# Patient Record
Sex: FEMALE | Race: WHITE | NOT HISPANIC OR LATINO
[De-identification: names, ages, dates, MRNs, and addresses within clinical notes are randomized per-mention and may not be internally consistent; named-entity substitution may affect disease eponyms.]

---

## 2017-01-05 ENCOUNTER — APPOINTMENT (OUTPATIENT)
Dept: INTERNAL MEDICINE | Facility: CLINIC | Age: 82
End: 2017-01-05

## 2017-04-20 ENCOUNTER — APPOINTMENT (OUTPATIENT)
Dept: PULMONOLOGY | Facility: CLINIC | Age: 82
End: 2017-04-20

## 2017-04-20 VITALS
HEART RATE: 76 BPM | SYSTOLIC BLOOD PRESSURE: 140 MMHG | DIASTOLIC BLOOD PRESSURE: 70 MMHG | OXYGEN SATURATION: 97 % | HEIGHT: 63 IN | BODY MASS INDEX: 23.21 KG/M2

## 2017-04-20 VITALS — BODY MASS INDEX: 23.21 KG/M2 | WEIGHT: 131 LBS

## 2017-04-24 ENCOUNTER — FORM ENCOUNTER (OUTPATIENT)
Age: 82
End: 2017-04-24

## 2017-04-25 ENCOUNTER — OUTPATIENT (OUTPATIENT)
Dept: OUTPATIENT SERVICES | Facility: HOSPITAL | Age: 82
LOS: 1 days | End: 2017-04-25
Payer: MEDICARE

## 2017-04-25 ENCOUNTER — APPOINTMENT (OUTPATIENT)
Dept: RADIOLOGY | Facility: CLINIC | Age: 82
End: 2017-04-25

## 2017-04-25 DIAGNOSIS — K76.89 OTHER SPECIFIED DISEASES OF LIVER: Chronic | ICD-10-CM

## 2017-04-25 DIAGNOSIS — Z96.619 PRESENCE OF UNSPECIFIED ARTIFICIAL SHOULDER JOINT: Chronic | ICD-10-CM

## 2017-04-25 DIAGNOSIS — J47.9 BRONCHIECTASIS, UNCOMPLICATED: ICD-10-CM

## 2017-04-25 PROCEDURE — 71046 X-RAY EXAM CHEST 2 VIEWS: CPT

## 2017-04-25 PROCEDURE — 71020: CPT | Mod: 26

## 2017-06-12 ENCOUNTER — APPOINTMENT (OUTPATIENT)
Dept: DERMATOLOGY | Facility: CLINIC | Age: 82
End: 2017-06-12

## 2017-06-16 ENCOUNTER — APPOINTMENT (OUTPATIENT)
Dept: PULMONOLOGY | Facility: CLINIC | Age: 82
End: 2017-06-16

## 2017-06-16 VITALS
BODY MASS INDEX: 22.67 KG/M2 | SYSTOLIC BLOOD PRESSURE: 128 MMHG | DIASTOLIC BLOOD PRESSURE: 82 MMHG | WEIGHT: 128 LBS | OXYGEN SATURATION: 96 % | HEART RATE: 71 BPM

## 2017-06-16 RX ORDER — PREDNISONE 10 MG/1
10 TABLET ORAL
Qty: 30 | Refills: 3 | Status: DISCONTINUED | COMMUNITY
Start: 2017-04-20 | End: 2017-06-16

## 2017-06-16 RX ORDER — DOXYCYCLINE HYCLATE 100 MG/1
100 CAPSULE ORAL
Qty: 10 | Refills: 6 | Status: DISCONTINUED | COMMUNITY
Start: 2017-04-20 | End: 2017-06-16

## 2017-06-16 RX ORDER — SERTRALINE HYDROCHLORIDE 50 MG/1
50 TABLET, FILM COATED ORAL
Qty: 45 | Refills: 0 | Status: DISCONTINUED | COMMUNITY
Start: 2016-12-07 | End: 2017-06-16

## 2017-09-25 ENCOUNTER — APPOINTMENT (OUTPATIENT)
Dept: CT IMAGING | Facility: CLINIC | Age: 82
End: 2017-09-25

## 2017-10-04 ENCOUNTER — APPOINTMENT (OUTPATIENT)
Dept: CT IMAGING | Facility: CLINIC | Age: 82
End: 2017-10-04
Payer: MEDICARE

## 2017-10-04 ENCOUNTER — OUTPATIENT (OUTPATIENT)
Dept: OUTPATIENT SERVICES | Facility: HOSPITAL | Age: 82
LOS: 1 days | End: 2017-10-04
Payer: MEDICARE

## 2017-10-04 DIAGNOSIS — K76.89 OTHER SPECIFIED DISEASES OF LIVER: Chronic | ICD-10-CM

## 2017-10-04 DIAGNOSIS — Z96.619 PRESENCE OF UNSPECIFIED ARTIFICIAL SHOULDER JOINT: Chronic | ICD-10-CM

## 2017-10-04 DIAGNOSIS — R91.8 OTHER NONSPECIFIC ABNORMAL FINDING OF LUNG FIELD: ICD-10-CM

## 2017-10-04 PROCEDURE — 71250 CT THORAX DX C-: CPT | Mod: 26

## 2017-10-04 PROCEDURE — 71250 CT THORAX DX C-: CPT

## 2017-10-09 ENCOUNTER — APPOINTMENT (OUTPATIENT)
Dept: THORACIC SURGERY | Facility: CLINIC | Age: 82
End: 2017-10-09
Payer: MEDICARE

## 2017-10-09 VITALS
HEIGHT: 63 IN | HEART RATE: 71 BPM | SYSTOLIC BLOOD PRESSURE: 166 MMHG | BODY MASS INDEX: 22.68 KG/M2 | DIASTOLIC BLOOD PRESSURE: 60 MMHG | OXYGEN SATURATION: 97 % | WEIGHT: 128 LBS | RESPIRATION RATE: 16 BRPM

## 2017-10-09 PROCEDURE — 99214 OFFICE O/P EST MOD 30 MIN: CPT

## 2017-10-16 ENCOUNTER — APPOINTMENT (OUTPATIENT)
Dept: PULMONOLOGY | Facility: CLINIC | Age: 82
End: 2017-10-16
Payer: MEDICARE

## 2017-10-16 VITALS
HEART RATE: 76 BPM | BODY MASS INDEX: 22.85 KG/M2 | RESPIRATION RATE: 16 BRPM | WEIGHT: 129 LBS | OXYGEN SATURATION: 95 % | DIASTOLIC BLOOD PRESSURE: 80 MMHG | SYSTOLIC BLOOD PRESSURE: 132 MMHG

## 2017-10-16 PROCEDURE — 99214 OFFICE O/P EST MOD 30 MIN: CPT

## 2017-11-06 ENCOUNTER — APPOINTMENT (OUTPATIENT)
Dept: INTERNAL MEDICINE | Facility: CLINIC | Age: 82
End: 2017-11-06
Payer: MEDICARE

## 2017-11-06 PROCEDURE — 90686 IIV4 VACC NO PRSV 0.5 ML IM: CPT

## 2017-11-06 PROCEDURE — G0008: CPT

## 2018-02-12 ENCOUNTER — APPOINTMENT (OUTPATIENT)
Dept: PULMONOLOGY | Facility: CLINIC | Age: 83
End: 2018-02-12

## 2018-02-14 ENCOUNTER — APPOINTMENT (OUTPATIENT)
Dept: PULMONOLOGY | Facility: CLINIC | Age: 83
End: 2018-02-14
Payer: MEDICARE

## 2018-02-14 VITALS
OXYGEN SATURATION: 94 % | WEIGHT: 128 LBS | DIASTOLIC BLOOD PRESSURE: 80 MMHG | HEART RATE: 94 BPM | SYSTOLIC BLOOD PRESSURE: 152 MMHG | HEIGHT: 62 IN | BODY MASS INDEX: 23.55 KG/M2

## 2018-02-14 PROCEDURE — 94010 BREATHING CAPACITY TEST: CPT

## 2018-02-14 PROCEDURE — 99214 OFFICE O/P EST MOD 30 MIN: CPT | Mod: 25

## 2018-02-14 RX ORDER — AMMONIUM LACTATE 12 %
12 CREAM (GRAM) TOPICAL
Qty: 385 | Refills: 0 | Status: DISCONTINUED | COMMUNITY
Start: 2017-05-19 | End: 2018-02-14

## 2018-02-23 ENCOUNTER — APPOINTMENT (OUTPATIENT)
Dept: INTERNAL MEDICINE | Facility: CLINIC | Age: 83
End: 2018-02-23
Payer: MEDICARE

## 2018-02-23 PROCEDURE — 99214 OFFICE O/P EST MOD 30 MIN: CPT | Mod: 25

## 2018-02-23 PROCEDURE — 36415 COLL VENOUS BLD VENIPUNCTURE: CPT

## 2018-04-02 ENCOUNTER — APPOINTMENT (OUTPATIENT)
Dept: CT IMAGING | Facility: CLINIC | Age: 83
End: 2018-04-02
Payer: MEDICARE

## 2018-04-02 ENCOUNTER — OUTPATIENT (OUTPATIENT)
Dept: OUTPATIENT SERVICES | Facility: HOSPITAL | Age: 83
LOS: 1 days | End: 2018-04-02
Payer: MEDICARE

## 2018-04-02 DIAGNOSIS — K76.89 OTHER SPECIFIED DISEASES OF LIVER: Chronic | ICD-10-CM

## 2018-04-02 DIAGNOSIS — Z00.8 ENCOUNTER FOR OTHER GENERAL EXAMINATION: ICD-10-CM

## 2018-04-02 DIAGNOSIS — Z96.619 PRESENCE OF UNSPECIFIED ARTIFICIAL SHOULDER JOINT: Chronic | ICD-10-CM

## 2018-04-02 PROCEDURE — 71250 CT THORAX DX C-: CPT | Mod: 26

## 2018-04-02 PROCEDURE — 71250 CT THORAX DX C-: CPT

## 2018-04-04 ENCOUNTER — OUTPATIENT (OUTPATIENT)
Dept: OUTPATIENT SERVICES | Facility: HOSPITAL | Age: 83
LOS: 1 days | End: 2018-04-04
Payer: MEDICARE

## 2018-04-04 VITALS
HEIGHT: 60 IN | OXYGEN SATURATION: 94 % | DIASTOLIC BLOOD PRESSURE: 74 MMHG | WEIGHT: 128.09 LBS | RESPIRATION RATE: 18 BRPM | TEMPERATURE: 98 F | SYSTOLIC BLOOD PRESSURE: 174 MMHG | HEART RATE: 76 BPM

## 2018-04-04 DIAGNOSIS — Z98.890 OTHER SPECIFIED POSTPROCEDURAL STATES: Chronic | ICD-10-CM

## 2018-04-04 DIAGNOSIS — Z01.810 ENCOUNTER FOR PREPROCEDURAL CARDIOVASCULAR EXAMINATION: ICD-10-CM

## 2018-04-04 DIAGNOSIS — K76.89 OTHER SPECIFIED DISEASES OF LIVER: Chronic | ICD-10-CM

## 2018-04-04 DIAGNOSIS — Z96.619 PRESENCE OF UNSPECIFIED ARTIFICIAL SHOULDER JOINT: Chronic | ICD-10-CM

## 2018-04-04 LAB
ANION GAP SERPL CALC-SCNC: 10 MMOL/L — SIGNIFICANT CHANGE UP (ref 5–17)
APTT BLD: 33.8 SEC — SIGNIFICANT CHANGE UP (ref 27.5–37.4)
BLD GP AB SCN SERPL QL: SIGNIFICANT CHANGE UP
BUN SERPL-MCNC: 13 MG/DL — SIGNIFICANT CHANGE UP (ref 8–20)
CALCIUM SERPL-MCNC: 9.5 MG/DL — SIGNIFICANT CHANGE UP (ref 8.6–10.2)
CHLORIDE SERPL-SCNC: 104 MMOL/L — SIGNIFICANT CHANGE UP (ref 98–107)
CO2 SERPL-SCNC: 30 MMOL/L — HIGH (ref 22–29)
CREAT SERPL-MCNC: 0.63 MG/DL — SIGNIFICANT CHANGE UP (ref 0.5–1.3)
ERYTHROCYTE [SEDIMENTATION RATE] IN BLOOD: 18 MM/HR — SIGNIFICANT CHANGE UP (ref 0–20)
GLUCOSE SERPL-MCNC: 96 MG/DL — SIGNIFICANT CHANGE UP (ref 70–115)
INR BLD: 1.04 RATIO — SIGNIFICANT CHANGE UP (ref 0.88–1.16)
POTASSIUM SERPL-MCNC: 4.2 MMOL/L — SIGNIFICANT CHANGE UP (ref 3.5–5.3)
POTASSIUM SERPL-SCNC: 4.2 MMOL/L — SIGNIFICANT CHANGE UP (ref 3.5–5.3)
PROTHROM AB SERPL-ACNC: 11.4 SEC — SIGNIFICANT CHANGE UP (ref 9.8–12.7)
SODIUM SERPL-SCNC: 144 MMOL/L — SIGNIFICANT CHANGE UP (ref 135–145)
T3 SERPL-MCNC: 114 NG/DL — SIGNIFICANT CHANGE UP (ref 80–200)
T4 AB SER-ACNC: 7.5 UG/DL — SIGNIFICANT CHANGE UP (ref 4.5–12)
TSH SERPL-MCNC: 1.73 UIU/ML — SIGNIFICANT CHANGE UP (ref 0.27–4.2)
TYPE + AB SCN PNL BLD: SIGNIFICANT CHANGE UP

## 2018-04-04 PROCEDURE — 36415 COLL VENOUS BLD VENIPUNCTURE: CPT

## 2018-04-04 PROCEDURE — 85730 THROMBOPLASTIN TIME PARTIAL: CPT

## 2018-04-04 PROCEDURE — 80048 BASIC METABOLIC PNL TOTAL CA: CPT

## 2018-04-04 PROCEDURE — 84436 ASSAY OF TOTAL THYROXINE: CPT

## 2018-04-04 PROCEDURE — 86901 BLOOD TYPING SEROLOGIC RH(D): CPT

## 2018-04-04 PROCEDURE — 85610 PROTHROMBIN TIME: CPT

## 2018-04-04 PROCEDURE — 85027 COMPLETE CBC AUTOMATED: CPT

## 2018-04-04 PROCEDURE — 86850 RBC ANTIBODY SCREEN: CPT

## 2018-04-04 PROCEDURE — G0463: CPT

## 2018-04-04 PROCEDURE — 93010 ELECTROCARDIOGRAM REPORT: CPT

## 2018-04-04 PROCEDURE — 84480 ASSAY TRIIODOTHYRONINE (T3): CPT

## 2018-04-04 PROCEDURE — 84443 ASSAY THYROID STIM HORMONE: CPT

## 2018-04-04 PROCEDURE — 86900 BLOOD TYPING SEROLOGIC ABO: CPT

## 2018-04-04 PROCEDURE — 85652 RBC SED RATE AUTOMATED: CPT

## 2018-04-04 PROCEDURE — 93005 ELECTROCARDIOGRAM TRACING: CPT

## 2018-04-04 PROCEDURE — 86038 ANTINUCLEAR ANTIBODIES: CPT

## 2018-04-04 NOTE — H&P PST ADULT - ASSESSMENT
Patient is an 86 y/o female with CCS angina 3 who presents today for PST for scheduled elective RHC and LHC to further evaluate her underlying coronary disease as well as AS.

## 2018-04-04 NOTE — H&P PST ADULT - PSH
Anal fistula    Ectopic pregnancy    H/O shoulder replacement  right  History of appendectomy    History of cholecystectomy    History of coronary angiogram  dr saenz  [facility]  2016  History of tonsillectomy    Liver cyst  surgical removal of multiple liver cysts

## 2018-04-04 NOTE — H&P PST ADULT - PMH
Aortic stenosis    Aortic valve insufficiency    Arthritis  osteoarthritis  Cataract    COPD (chronic obstructive pulmonary disease)    BENITEZ (dyspnea on exertion)    Hiatal hernia    HLD (hyperlipidemia)    HTN (hypertension)    Humerus fracture, right, sequela  residual weakness  Left ventricular outflow tract obstruction    Liver, polycystic    Macular degeneration of both eyes    Mitral valve insufficiency    Pericardial effusion    Smoker unmotivated to quit    SOB (shortness of breath)    Unstable angina

## 2018-04-04 NOTE — ASU PATIENT PROFILE, ADULT - PMH
Arthritis  osteoarthritis  Cataract    COPD (chronic obstructive pulmonary disease)    BENITEZ (dyspnea on exertion)    Hiatal hernia    HLD (hyperlipidemia)    HTN (hypertension)    Humerus fracture, right, sequela  residual weakness  Left ventricular outflow tract obstruction    Macular degeneration of both eyes    Smoker unmotivated to quit    SOB (shortness of breath)    Unstable angina Aortic stenosis    Aortic valve insufficiency    Arthritis  osteoarthritis  Cataract    COPD (chronic obstructive pulmonary disease)    BENITEZ (dyspnea on exertion)    Hiatal hernia    HLD (hyperlipidemia)    HTN (hypertension)    Humerus fracture, right, sequela  residual weakness  Left ventricular outflow tract obstruction    Macular degeneration of both eyes    Mitral valve insufficiency    Pericardial effusion    Smoker unmotivated to quit    SOB (shortness of breath)    Unstable angina

## 2018-04-04 NOTE — ASU PATIENT PROFILE, ADULT - PSH
Anal fistula    Ectopic pregnancy    H/O shoulder replacement  left  History of appendectomy    Liver cyst Anal fistula    Ectopic pregnancy    H/O shoulder replacement  right  History of appendectomy    History of coronary angiogram  dr saenz  [facility]  2016  History of tonsillectomy    Liver cyst Anal fistula    Ectopic pregnancy    H/O shoulder replacement  right  History of appendectomy    History of cholecystectomy    History of coronary angiogram  dr saenz  [facility]  2016  History of tonsillectomy    Liver cyst

## 2018-04-06 LAB
ANA PAT FLD IF-IMP: ABNORMAL
ANA TITR SER: ABNORMAL

## 2018-04-09 ENCOUNTER — TRANSCRIPTION ENCOUNTER (OUTPATIENT)
Age: 83
End: 2018-04-09

## 2018-04-09 ENCOUNTER — OUTPATIENT (OUTPATIENT)
Dept: OUTPATIENT SERVICES | Facility: HOSPITAL | Age: 83
LOS: 1 days | Discharge: HOME | End: 2018-04-09
Payer: MEDICARE

## 2018-04-09 VITALS — HEART RATE: 59 BPM | OXYGEN SATURATION: 92 % | RESPIRATION RATE: 20 BRPM

## 2018-04-09 VITALS
OXYGEN SATURATION: 95 % | HEART RATE: 66 BPM | RESPIRATION RATE: 16 BRPM | SYSTOLIC BLOOD PRESSURE: 152 MMHG | DIASTOLIC BLOOD PRESSURE: 65 MMHG | TEMPERATURE: 98 F

## 2018-04-09 DIAGNOSIS — I25.10 ATHEROSCLEROTIC HEART DISEASE OF NATIVE CORONARY ARTERY WITHOUT ANGINA PECTORIS: ICD-10-CM

## 2018-04-09 DIAGNOSIS — Z98.890 OTHER SPECIFIED POSTPROCEDURAL STATES: Chronic | ICD-10-CM

## 2018-04-09 DIAGNOSIS — K76.89 OTHER SPECIFIED DISEASES OF LIVER: Chronic | ICD-10-CM

## 2018-04-09 DIAGNOSIS — Z96.619 PRESENCE OF UNSPECIFIED ARTIFICIAL SHOULDER JOINT: Chronic | ICD-10-CM

## 2018-04-09 LAB
HCT VFR BLD CALC: 44.9 % — SIGNIFICANT CHANGE UP (ref 37–47)
HGB BLD-MCNC: 14.5 G/DL — SIGNIFICANT CHANGE UP (ref 12–16)
MCHC RBC-ENTMCNC: 29.8 PG — SIGNIFICANT CHANGE UP (ref 27–31)
MCHC RBC-ENTMCNC: 32.3 G/DL — SIGNIFICANT CHANGE UP (ref 32–36)
MCV RBC AUTO: 92.2 FL — SIGNIFICANT CHANGE UP (ref 81–99)
PLATELET # BLD AUTO: 263 K/UL — SIGNIFICANT CHANGE UP (ref 150–400)
RBC # BLD: 4.87 M/UL — SIGNIFICANT CHANGE UP (ref 4.4–5.2)
RBC # FLD: 13.9 % — SIGNIFICANT CHANGE UP (ref 11–15.6)
WBC # BLD: 7.4 K/UL — SIGNIFICANT CHANGE UP (ref 4.8–10.8)
WBC # FLD AUTO: 7.4 K/UL — SIGNIFICANT CHANGE UP (ref 4.8–10.8)

## 2018-04-09 PROCEDURE — C1894: CPT

## 2018-04-09 PROCEDURE — 36415 COLL VENOUS BLD VENIPUNCTURE: CPT

## 2018-04-09 PROCEDURE — 85027 COMPLETE CBC AUTOMATED: CPT

## 2018-04-09 PROCEDURE — C1769: CPT

## 2018-04-09 PROCEDURE — C1889: CPT

## 2018-04-09 PROCEDURE — C1887: CPT

## 2018-04-09 PROCEDURE — 93460 R&L HRT ART/VENTRICLE ANGIO: CPT

## 2018-04-09 RX ORDER — SERTRALINE 25 MG/1
0 TABLET, FILM COATED ORAL
Qty: 0 | Refills: 0 | COMMUNITY

## 2018-04-09 NOTE — DISCHARGE NOTE ADULT - CARE PLAN
Principal Discharge DX:	Aortic stenosis  Goal:	Remain without the need for aortic repair  Assessment and plan of treatment:	No heavy lifting, driving, sex, tub baths, swimming, or any activity that submerges the lower half of the body in water for 48 hours.  Limited walking and stairs for 48 hours.    Change the bandaid after 24 hours and every 24 hours after that.  Keep the puncture site dry and covered with a bandaid until a scab forms.    Observe the site frequently.  If bleeding or a large lump (the size of a golf ball or bigger) occurs lie flat, apply continuous direct pressure just above the puncture site for at least 10 minutes, and notify your physician immediately.  If the bleeding cannot be controlled, call 911 immediately for assistance.  Notify your physician of pain, swelling or any drainage.    Notify your physician immediately if coldness, numbness, discoloration or pain in your foot occurs.  Follow up with Dr. Olea in one to two weeks.

## 2018-04-09 NOTE — DISCHARGE NOTE ADULT - CARE PROVIDER_API CALL
David Olea), Cardiovascular Disease; Internal Medicine  [address] [place] [address]  Phone: [phone]  Fax: [phone]

## 2018-04-09 NOTE — DISCHARGE NOTE ADULT - NS AS ACTIVITY OBS
Showering allowed/No Heavy lifting/straining/Walking-Outdoors allowed/Stairs allowed/Walking-Indoors allowed

## 2018-04-09 NOTE — DISCHARGE NOTE ADULT - PLAN OF CARE
Remain without the need for aortic repair No heavy lifting, driving, sex, tub baths, swimming, or any activity that submerges the lower half of the body in water for 48 hours.  Limited walking and stairs for 48 hours.    Change the bandaid after 24 hours and every 24 hours after that.  Keep the puncture site dry and covered with a bandaid until a scab forms.    Observe the site frequently.  If bleeding or a large lump (the size of a golf ball or bigger) occurs lie flat, apply continuous direct pressure just above the puncture site for at least 10 minutes, and notify your physician immediately.  If the bleeding cannot be controlled, call 911 immediately for assistance.  Notify your physician of pain, swelling or any drainage.    Notify your physician immediately if coldness, numbness, discoloration or pain in your foot occurs.  Follow up with Dr. Olea in one to two weeks.

## 2018-04-09 NOTE — DISCHARGE NOTE ADULT - MEDICATION SUMMARY - MEDICATIONS TO TAKE
I will START or STAY ON the medications listed below when I get home from the hospital:    viteye  -- 1 tab(s) by mouth once a day  -- Indication: For supplement    aspirin 81 mg oral tablet  -- 1 tab(s) by mouth once a day  -- Indication: For Antiplatelet    sertraline 25 mg oral tablet  -- 1 tab(s) by mouth once a day  -- Indication: For mood    simvastatin 20 mg oral tablet  -- 1 tab(s) by mouth once a day (at bedtime)  -- Indication: For high cholesterol    atenolol 25 mg oral tablet  -- 1 tab(s) by mouth once a day  -- Indication: For heart    Anoro Ellipta 62.5 mcg-25 mcg inhalation powder  -- 1 puff(s) inhaled once a day  -- Indication: For copd    albuterol 0.63 mg/3 mL (0.021%) inhalation solution  --  inhaled , As Needed  -- Indication: For copd    Norvasc 5 mg oral tablet  -- 1 tab(s) by mouth once a day  -- Indication: For heart

## 2018-04-09 NOTE — DISCHARGE NOTE ADULT - PATIENT PORTAL LINK FT
You can access the [facility] Patient Portal, offered by [facility], by registering with the following website: [email or web address]

## 2018-04-17 DIAGNOSIS — R07.89 OTHER CHEST PAIN: ICD-10-CM

## 2018-04-23 ENCOUNTER — APPOINTMENT (OUTPATIENT)
Dept: PULMONOLOGY | Facility: CLINIC | Age: 83
End: 2018-04-23
Payer: MEDICARE

## 2018-04-23 VITALS
WEIGHT: 126 LBS | HEART RATE: 74 BPM | OXYGEN SATURATION: 95 % | DIASTOLIC BLOOD PRESSURE: 72 MMHG | SYSTOLIC BLOOD PRESSURE: 118 MMHG | BODY MASS INDEX: 23.05 KG/M2

## 2018-04-23 PROCEDURE — 99215 OFFICE O/P EST HI 40 MIN: CPT

## 2018-04-23 RX ORDER — DOXYCYCLINE HYCLATE 100 MG/1
100 CAPSULE ORAL
Qty: 10 | Refills: 6 | Status: DISCONTINUED | COMMUNITY
Start: 2018-02-14 | End: 2018-04-23

## 2018-04-23 RX ORDER — PREDNISONE 10 MG/1
10 TABLET ORAL
Qty: 30 | Refills: 1 | Status: DISCONTINUED | COMMUNITY
Start: 2018-02-14 | End: 2018-04-23

## 2018-06-28 ENCOUNTER — APPOINTMENT (OUTPATIENT)
Dept: PULMONOLOGY | Facility: CLINIC | Age: 83
End: 2018-06-28
Payer: MEDICARE

## 2018-06-28 VITALS
HEART RATE: 74 BPM | BODY MASS INDEX: 22.45 KG/M2 | WEIGHT: 122 LBS | HEIGHT: 62 IN | SYSTOLIC BLOOD PRESSURE: 142 MMHG | OXYGEN SATURATION: 90 % | DIASTOLIC BLOOD PRESSURE: 80 MMHG

## 2018-06-28 VITALS — OXYGEN SATURATION: 94 % | RESPIRATION RATE: 14 BRPM

## 2018-06-28 PROCEDURE — 99215 OFFICE O/P EST HI 40 MIN: CPT | Mod: 25

## 2018-06-28 PROCEDURE — 94010 BREATHING CAPACITY TEST: CPT

## 2018-09-26 ENCOUNTER — APPOINTMENT (OUTPATIENT)
Dept: PULMONOLOGY | Facility: CLINIC | Age: 83
End: 2018-09-26
Payer: MEDICARE

## 2018-09-26 VITALS
HEART RATE: 89 BPM | DIASTOLIC BLOOD PRESSURE: 80 MMHG | OXYGEN SATURATION: 94 % | WEIGHT: 123 LBS | SYSTOLIC BLOOD PRESSURE: 136 MMHG | BODY MASS INDEX: 22.5 KG/M2

## 2018-09-26 PROBLEM — I35.0 NONRHEUMATIC AORTIC (VALVE) STENOSIS: Chronic | Status: ACTIVE | Noted: 2018-04-04

## 2018-09-26 PROBLEM — I35.1 NONRHEUMATIC AORTIC (VALVE) INSUFFICIENCY: Chronic | Status: ACTIVE | Noted: 2018-04-04

## 2018-09-26 PROBLEM — I31.3 PERICARDIAL EFFUSION (NONINFLAMMATORY): Chronic | Status: ACTIVE | Noted: 2018-04-04

## 2018-09-26 PROBLEM — I34.0 NONRHEUMATIC MITRAL (VALVE) INSUFFICIENCY: Chronic | Status: ACTIVE | Noted: 2018-04-04

## 2018-09-26 PROBLEM — Q44.6 CYSTIC DISEASE OF LIVER: Chronic | Status: ACTIVE | Noted: 2018-04-04

## 2018-09-26 PROCEDURE — 99214 OFFICE O/P EST MOD 30 MIN: CPT

## 2018-09-26 RX ORDER — PREDNISONE 10 MG/1
10 TABLET ORAL
Qty: 30 | Refills: 3 | Status: DISCONTINUED | COMMUNITY
Start: 2018-06-28 | End: 2018-09-26

## 2018-12-21 ENCOUNTER — APPOINTMENT (OUTPATIENT)
Dept: INTERNAL MEDICINE | Facility: CLINIC | Age: 83
End: 2018-12-21
Payer: MEDICARE

## 2018-12-21 VITALS
BODY MASS INDEX: 22.63 KG/M2 | HEIGHT: 62 IN | SYSTOLIC BLOOD PRESSURE: 130 MMHG | DIASTOLIC BLOOD PRESSURE: 80 MMHG | WEIGHT: 123 LBS

## 2018-12-21 DIAGNOSIS — R60.0 LOCALIZED EDEMA: ICD-10-CM

## 2018-12-21 PROCEDURE — G0008: CPT

## 2018-12-21 PROCEDURE — 90662 IIV NO PRSV INCREASED AG IM: CPT

## 2018-12-21 PROCEDURE — 99214 OFFICE O/P EST MOD 30 MIN: CPT | Mod: 25

## 2018-12-21 RX ORDER — RANITIDINE 150 MG/1
150 TABLET ORAL
Qty: 60 | Refills: 0 | Status: DISCONTINUED | COMMUNITY
Start: 2018-07-26

## 2018-12-21 NOTE — HISTORY OF PRESENT ILLNESS
[FreeTextEntry1] : leg swelling  [de-identified] : Ms. EL SHER is a 85 year female with a PMH of HTN, COPD comes to the office c/o bilateral leg swelling x 2-3 weeks. Patient denies chest pain, wheezing, nausea vomiting diarrhea.

## 2018-12-21 NOTE — REVIEW OF SYSTEMS
[Chest Pain] : no chest pain [Palpitations] : no palpitations [Leg Claudication] : no leg claudication [Lower Ext Edema] : lower extremity edema [Orthopnea] : no orthopnea [Paroysmal Nocturnal Dyspnea] : no paroysmal nocturnal dyspnea [Negative] : Heme/Lymph

## 2018-12-21 NOTE — PHYSICAL EXAM
[No Acute Distress] : no acute distress [Well Nourished] : well nourished [Well Developed] : well developed [Well-Appearing] : well-appearing [Normal Sclera/Conjunctiva] : normal sclera/conjunctiva [PERRL] : pupils equal round and reactive to light [EOMI] : extraocular movements intact [Normal Outer Ear/Nose] : the outer ears and nose were normal in appearance [Normal Oropharynx] : the oropharynx was normal [No JVD] : no jugular venous distention [Supple] : supple [No Lymphadenopathy] : no lymphadenopathy [Thyroid Normal, No Nodules] : the thyroid was normal and there were no nodules present [No Respiratory Distress] : no respiratory distress  [No Accessory Muscle Use] : no accessory muscle use [Normal Rate] : normal rate  [Regular Rhythm] : with a regular rhythm [Normal S1, S2] : normal S1 and S2 [No Murmur] : no murmur heard [No Carotid Bruits] : no carotid bruits [No Abdominal Bruit] : a ~M bruit was not heard ~T in the abdomen [No Varicosities] : no varicosities [Pedal Pulses Present] : the pedal pulses are present [No Extremity Clubbing/Cyanosis] : no extremity clubbing/cyanosis [Soft] : abdomen soft [Non Tender] : non-tender [Non-distended] : non-distended [de-identified] : +2 pitting edema bilateral lower extremities.

## 2018-12-21 NOTE — PLAN
[FreeTextEntry1] : In regards to patient's leg swelling, will advise patient to keep legs elevated when lying down, compression stockings and Lasix every other day prescribed. \par \par Patient received flu vaccine today. Patient advised of adverse effects of medication including but not limited to muscle soreness at site of injection and general malaise \par \par Counseling included abnormal lab results, differential diagnoses, treatment options, risks and benefits, lifestyle changes, prognosis, current condition, medications, and dose adjustments. \par The patient was interactive, attentive, asked questions, and verbalized understanding

## 2019-01-22 ENCOUNTER — APPOINTMENT (OUTPATIENT)
Dept: INTERNAL MEDICINE | Facility: CLINIC | Age: 84
End: 2019-01-22
Payer: MEDICARE

## 2019-01-22 ENCOUNTER — NON-APPOINTMENT (OUTPATIENT)
Age: 84
End: 2019-01-22

## 2019-01-22 VITALS
WEIGHT: 123 LBS | HEIGHT: 62 IN | BODY MASS INDEX: 22.63 KG/M2 | SYSTOLIC BLOOD PRESSURE: 160 MMHG | DIASTOLIC BLOOD PRESSURE: 70 MMHG

## 2019-01-22 DIAGNOSIS — Z00.00 ENCOUNTER FOR GENERAL ADULT MEDICAL EXAMINATION W/OUT ABNORMAL FINDINGS: ICD-10-CM

## 2019-01-22 PROCEDURE — 99215 OFFICE O/P EST HI 40 MIN: CPT | Mod: 25

## 2019-01-22 PROCEDURE — 99406 BEHAV CHNG SMOKING 3-10 MIN: CPT

## 2019-01-22 PROCEDURE — G0439: CPT

## 2019-01-22 PROCEDURE — 93000 ELECTROCARDIOGRAM COMPLETE: CPT

## 2019-01-22 PROCEDURE — 36415 COLL VENOUS BLD VENIPUNCTURE: CPT

## 2019-01-22 NOTE — PHYSICAL EXAM
[No Acute Distress] : no acute distress [Well Nourished] : well nourished [Well Developed] : well developed [Well-Appearing] : well-appearing [Normal Sclera/Conjunctiva] : normal sclera/conjunctiva [PERRL] : pupils equal round and reactive to light [EOMI] : extraocular movements intact [Normal Outer Ear/Nose] : the outer ears and nose were normal in appearance [Normal Oropharynx] : the oropharynx was normal [No JVD] : no jugular venous distention [Supple] : supple [No Lymphadenopathy] : no lymphadenopathy [Thyroid Normal, No Nodules] : the thyroid was normal and there were no nodules present [No Respiratory Distress] : no respiratory distress  [No Accessory Muscle Use] : no accessory muscle use [Normal Rate] : normal rate  [Regular Rhythm] : with a regular rhythm [Normal S1, S2] : normal S1 and S2 [No Murmur] : no murmur heard [No Carotid Bruits] : no carotid bruits [No Abdominal Bruit] : a ~M bruit was not heard ~T in the abdomen [No Varicosities] : no varicosities [Pedal Pulses Present] : the pedal pulses are present [No Extremity Clubbing/Cyanosis] : no extremity clubbing/cyanosis [Soft] : abdomen soft [Non Tender] : non-tender [Non-distended] : non-distended [de-identified] : +2 pitting edema bilateral lower extremities.

## 2019-01-22 NOTE — HISTORY OF PRESENT ILLNESS
[FreeTextEntry1] : physical [de-identified] : Ms. EL SHER is a 85 year female with a PMH of HTN, COPD comes to the office for physical exam. Patient feels well and has no complaints at this time.

## 2019-01-22 NOTE — HEALTH RISK ASSESSMENT
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

## 2019-01-23 LAB
ALBUMIN SERPL ELPH-MCNC: 4.1 G/DL
ALP BLD-CCNC: 68 U/L
ALT SERPL-CCNC: 10 U/L
ANION GAP SERPL CALC-SCNC: 17 MMOL/L
AST SERPL-CCNC: 22 U/L
BASOPHILS # BLD AUTO: 0.03 K/UL
BASOPHILS NFR BLD AUTO: 0.3 %
BILIRUB SERPL-MCNC: 0.3 MG/DL
BUN SERPL-MCNC: 9 MG/DL
CALCIUM SERPL-MCNC: 9.8 MG/DL
CHLORIDE SERPL-SCNC: 106 MMOL/L
CHOLEST SERPL-MCNC: 183 MG/DL
CHOLEST/HDLC SERPL: 2.6 RATIO
CO2 SERPL-SCNC: 26 MMOL/L
CREAT SERPL-MCNC: 0.75 MG/DL
EOSINOPHIL # BLD AUTO: 0.38 K/UL
EOSINOPHIL NFR BLD AUTO: 4.1 %
GLUCOSE SERPL-MCNC: 82 MG/DL
HCT VFR BLD CALC: 45.6 %
HDLC SERPL-MCNC: 71 MG/DL
HGB BLD-MCNC: 14.7 G/DL
IMM GRANULOCYTES NFR BLD AUTO: 0.3 %
LDLC SERPL CALC-MCNC: 100 MG/DL
LYMPHOCYTES # BLD AUTO: 0.86 K/UL
LYMPHOCYTES NFR BLD AUTO: 9.4 %
MAN DIFF?: NORMAL
MCHC RBC-ENTMCNC: 30.4 PG
MCHC RBC-ENTMCNC: 32.2 GM/DL
MCV RBC AUTO: 94.2 FL
MONOCYTES # BLD AUTO: 0.75 K/UL
MONOCYTES NFR BLD AUTO: 8.2 %
NEUTROPHILS # BLD AUTO: 7.13 K/UL
NEUTROPHILS NFR BLD AUTO: 77.7 %
PLATELET # BLD AUTO: 268 K/UL
POTASSIUM SERPL-SCNC: 4.8 MMOL/L
PROT SERPL-MCNC: 7.3 G/DL
RBC # BLD: 4.84 M/UL
RBC # FLD: 14.1 %
SODIUM SERPL-SCNC: 149 MMOL/L
TRIGL SERPL-MCNC: 62 MG/DL
WBC # FLD AUTO: 9.18 K/UL

## 2019-01-23 RX ORDER — SIMVASTATIN 20 MG/1
20 TABLET, FILM COATED ORAL
Qty: 90 | Refills: 0 | Status: COMPLETED | COMMUNITY
Start: 2017-03-28 | End: 2019-01-23

## 2019-01-31 ENCOUNTER — APPOINTMENT (OUTPATIENT)
Dept: PULMONOLOGY | Facility: CLINIC | Age: 84
End: 2019-01-31
Payer: MEDICARE

## 2019-01-31 VITALS
HEART RATE: 79 BPM | SYSTOLIC BLOOD PRESSURE: 122 MMHG | OXYGEN SATURATION: 94 % | WEIGHT: 122 LBS | DIASTOLIC BLOOD PRESSURE: 66 MMHG | BODY MASS INDEX: 22.31 KG/M2

## 2019-01-31 PROCEDURE — 99215 OFFICE O/P EST HI 40 MIN: CPT

## 2019-01-31 NOTE — REASON FOR VISIT
[Follow-Up] : a follow-up visit [Abnormal CT Scan] : abnormal CT Scan [Abnormal Chest X-Ray] : abnormal Chest X-Ray [Shortness of Breath] : shortness of Breath [COPD] : COPD

## 2019-01-31 NOTE — HISTORY OF PRESENT ILLNESS
[FreeTextEntry1] : chronic exertional dyspnea , close to baseline\par No reported fever chills or chest pain\par using anoro , she has no medication coverage\par Still smoking\par no fever, chills, chest pain \par

## 2019-01-31 NOTE — DISCUSSION/SUMMARY
[FreeTextEntry1] : COPD Gold class III at baseline, Nicotine addiction\par Recent CT 4/18 scan was resolved LLL nodule, mucus plugging and bronchial wall thickening\par spirometry stable, cath noted LVH with elevated PCWP, no AS\par abx/ prednisone prn\par continue anoro daily \par DuoNeb  bid/tid when no samples\par meets home 02 criteria, advised nocturnal use\par We'll reevaluate in 3-4  months with joe\par Smoking cessation stressed\par would benefit from pulmonary rehabilitation,\par vaccinations this fall\par prognosis guarded\par

## 2019-01-31 NOTE — PHYSICAL EXAM
[General Appearance - Well Developed] : well developed [General Appearance - Well Nourished] : well nourished [Neck Appearance] : the appearance of the neck was normal [Heart Rate And Rhythm] : heart rate and rhythm were normal [Heart Sounds] : normal S1 and S2 [Respiration, Rhythm And Depth] : normal respiratory rhythm and effort [Exaggerated Use Of Accessory Muscles For Inspiration] : no accessory muscle use [Abdomen Tenderness] : non-tender [] : no rash [No Focal Deficits] : no focal deficits [Oriented To Time, Place, And Person] : oriented to person, place, and time [Impaired Insight] : insight and judgment were intact [Affect] : the affect was normal [Memory Recent] : recent memory was not impaired [FreeTextEntry1] : no edema

## 2019-01-31 NOTE — PROCEDURE
[FreeTextEntry1] : recent CT scan 9/16 ;reviewed and discussed, Stable nodules from 2012,Bronchiectasis  with mucus plugging\par \par spirometry with severe air flow obstruction, stable\par sp02 88% with ex, 95%2 liters pulsed

## 2019-01-31 NOTE — CONSULT LETTER
[Dear  ___] : Dear  [unfilled], [Consult Letter:] : I had the pleasure of evaluating your patient, [unfilled]. [Please see my note below.] : Please see my note below. [Consult Closing:] : Thank you very much for allowing me to participate in the care of this patient.  If you have any questions, please do not hesitate to contact me. [Sincerely,] : Sincerely, [FreeTextEntry3] : Oziel Mejia DO [facility]\par Pulmonary Critical Care\par Director Pulmonary Division\par Medical Director Respiratory Therapy\par [facility]\par \par  [DrBecky  ___] : Dr. DENISE [Oz[facility] Mjeia DO, [facility]] : Oziel Mejia DO, [facility] [Director, Respiratory Care] : Director, Respiratory Care [[facility]] : [facility]

## 2019-02-12 ENCOUNTER — INPATIENT (INPATIENT)
Facility: HOSPITAL | Age: 84
LOS: 6 days | Discharge: HOME HEALTH | DRG: 193 | End: 2019-02-19
Attending: FAMILY MEDICINE | Admitting: INTERNAL MEDICINE
Payer: MEDICARE

## 2019-02-12 ENCOUNTER — TRANSCRIPTION ENCOUNTER (OUTPATIENT)
Age: 84
End: 2019-02-12

## 2019-02-12 VITALS
SYSTOLIC BLOOD PRESSURE: 140 MMHG | HEART RATE: 82 BPM | RESPIRATION RATE: 16 BRPM | OXYGEN SATURATION: 97 % | WEIGHT: 125 LBS | TEMPERATURE: 99 F | DIASTOLIC BLOOD PRESSURE: 70 MMHG

## 2019-02-12 DIAGNOSIS — F17.200 NICOTINE DEPENDENCE, UNSPECIFIED, UNCOMPLICATED: ICD-10-CM

## 2019-02-12 DIAGNOSIS — J18.9 PNEUMONIA, UNSPECIFIED ORGANISM: ICD-10-CM

## 2019-02-12 DIAGNOSIS — K76.89 OTHER SPECIFIED DISEASES OF LIVER: Chronic | ICD-10-CM

## 2019-02-12 DIAGNOSIS — Z98.890 OTHER SPECIFIED POSTPROCEDURAL STATES: Chronic | ICD-10-CM

## 2019-02-12 DIAGNOSIS — I35.0 NONRHEUMATIC AORTIC (VALVE) STENOSIS: ICD-10-CM

## 2019-02-12 DIAGNOSIS — Z96.619 PRESENCE OF UNSPECIFIED ARTIFICIAL SHOULDER JOINT: Chronic | ICD-10-CM

## 2019-02-12 DIAGNOSIS — I10 ESSENTIAL (PRIMARY) HYPERTENSION: ICD-10-CM

## 2019-02-12 LAB
ALBUMIN SERPL ELPH-MCNC: 3.4 G/DL — SIGNIFICANT CHANGE UP (ref 3.3–5.2)
ALP SERPL-CCNC: 60 U/L — SIGNIFICANT CHANGE UP (ref 40–120)
ALT FLD-CCNC: 13 U/L — SIGNIFICANT CHANGE UP
ANION GAP SERPL CALC-SCNC: 10 MMOL/L — SIGNIFICANT CHANGE UP (ref 5–17)
APPEARANCE UR: CLEAR — SIGNIFICANT CHANGE UP
APTT BLD: 33.3 SEC — SIGNIFICANT CHANGE UP (ref 27.5–36.3)
AST SERPL-CCNC: 18 U/L — SIGNIFICANT CHANGE UP
BACTERIA # UR AUTO: ABNORMAL
BILIRUB SERPL-MCNC: 0.3 MG/DL — LOW (ref 0.4–2)
BILIRUB UR-MCNC: NEGATIVE — SIGNIFICANT CHANGE UP
BUN SERPL-MCNC: 12 MG/DL — SIGNIFICANT CHANGE UP (ref 8–20)
CALCIUM SERPL-MCNC: 9.1 MG/DL — SIGNIFICANT CHANGE UP (ref 8.6–10.2)
CHLORIDE SERPL-SCNC: 97 MMOL/L — LOW (ref 98–107)
CO2 SERPL-SCNC: 32 MMOL/L — HIGH (ref 22–29)
COLOR SPEC: YELLOW — SIGNIFICANT CHANGE UP
COMMENT - URINE: SIGNIFICANT CHANGE UP
CREAT SERPL-MCNC: 0.58 MG/DL — SIGNIFICANT CHANGE UP (ref 0.5–1.3)
CRP SERPL-MCNC: 11.4 MG/DL — HIGH (ref 0–0.4)
DIFF PNL FLD: ABNORMAL
EPI CELLS # UR: SIGNIFICANT CHANGE UP
ERYTHROCYTE [SEDIMENTATION RATE] IN BLOOD: 40 MM/HR — HIGH (ref 0–20)
GLUCOSE SERPL-MCNC: 122 MG/DL — HIGH (ref 70–115)
GLUCOSE UR QL: NEGATIVE MG/DL — SIGNIFICANT CHANGE UP
HCT VFR BLD CALC: 40.3 % — SIGNIFICANT CHANGE UP (ref 37–47)
HGB BLD-MCNC: 12.8 G/DL — SIGNIFICANT CHANGE UP (ref 12–16)
INR BLD: 1.15 RATIO — SIGNIFICANT CHANGE UP (ref 0.88–1.16)
KETONES UR-MCNC: NEGATIVE — SIGNIFICANT CHANGE UP
LACTATE BLDV-MCNC: 2.8 MMOL/L — HIGH (ref 0.5–2)
LEUKOCYTE ESTERASE UR-ACNC: ABNORMAL
LIDOCAIN IGE QN: 18 U/L — LOW (ref 22–51)
MAGNESIUM SERPL-MCNC: 2 MG/DL — SIGNIFICANT CHANGE UP (ref 1.6–2.6)
MCHC RBC-ENTMCNC: 29.8 PG — SIGNIFICANT CHANGE UP (ref 27–31)
MCHC RBC-ENTMCNC: 31.8 G/DL — LOW (ref 32–36)
MCV RBC AUTO: 93.7 FL — SIGNIFICANT CHANGE UP (ref 81–99)
NITRITE UR-MCNC: NEGATIVE — SIGNIFICANT CHANGE UP
NT-PROBNP SERPL-SCNC: 2883 PG/ML — HIGH (ref 0–300)
PH UR: 5 — SIGNIFICANT CHANGE UP (ref 5–8)
PLATELET # BLD AUTO: 274 K/UL — SIGNIFICANT CHANGE UP (ref 150–400)
POTASSIUM SERPL-MCNC: 3.8 MMOL/L — SIGNIFICANT CHANGE UP (ref 3.5–5.3)
POTASSIUM SERPL-SCNC: 3.8 MMOL/L — SIGNIFICANT CHANGE UP (ref 3.5–5.3)
PROCALCITONIN SERPL-MCNC: 0.05 NG/ML — HIGH (ref 0–0.04)
PROT SERPL-MCNC: 6.8 G/DL — SIGNIFICANT CHANGE UP (ref 6.6–8.7)
PROT UR-MCNC: 30 MG/DL
PROTHROM AB SERPL-ACNC: 13.3 SEC — HIGH (ref 10–12.9)
RAPID RVP RESULT: SIGNIFICANT CHANGE UP
RBC # BLD: 4.3 M/UL — LOW (ref 4.4–5.2)
RBC # FLD: 13.6 % — SIGNIFICANT CHANGE UP (ref 11–15.6)
RBC CASTS # UR COMP ASSIST: ABNORMAL /HPF (ref 0–4)
SODIUM SERPL-SCNC: 139 MMOL/L — SIGNIFICANT CHANGE UP (ref 135–145)
SP GR SPEC: 1.01 — SIGNIFICANT CHANGE UP (ref 1.01–1.02)
TROPONIN T SERPL-MCNC: <0.01 NG/ML — SIGNIFICANT CHANGE UP (ref 0–0.06)
UROBILINOGEN FLD QL: 1 MG/DL
WBC # BLD: 6.3 K/UL — SIGNIFICANT CHANGE UP (ref 4.8–10.8)
WBC # FLD AUTO: 6.3 K/UL — SIGNIFICANT CHANGE UP (ref 4.8–10.8)
WBC UR QL: SIGNIFICANT CHANGE UP

## 2019-02-12 PROCEDURE — 71045 X-RAY EXAM CHEST 1 VIEW: CPT | Mod: 26

## 2019-02-12 PROCEDURE — 99223 1ST HOSP IP/OBS HIGH 75: CPT | Mod: AI

## 2019-02-12 PROCEDURE — 99285 EMERGENCY DEPT VISIT HI MDM: CPT

## 2019-02-12 PROCEDURE — 71275 CT ANGIOGRAPHY CHEST: CPT | Mod: 26

## 2019-02-12 PROCEDURE — 99223 1ST HOSP IP/OBS HIGH 75: CPT

## 2019-02-12 RX ORDER — SERTRALINE 25 MG/1
25 TABLET, FILM COATED ORAL DAILY
Qty: 0 | Refills: 0 | Status: DISCONTINUED | OUTPATIENT
Start: 2019-02-12 | End: 2019-02-14

## 2019-02-12 RX ORDER — ATENOLOL 25 MG/1
25 TABLET ORAL DAILY
Qty: 0 | Refills: 0 | Status: DISCONTINUED | OUTPATIENT
Start: 2019-02-12 | End: 2019-02-19

## 2019-02-12 RX ORDER — IPRATROPIUM/ALBUTEROL SULFATE 18-103MCG
3 AEROSOL WITH ADAPTER (GRAM) INHALATION ONCE
Qty: 0 | Refills: 0 | Status: COMPLETED | OUTPATIENT
Start: 2019-02-12 | End: 2019-02-12

## 2019-02-12 RX ORDER — ENOXAPARIN SODIUM 100 MG/ML
40 INJECTION SUBCUTANEOUS EVERY 24 HOURS
Qty: 0 | Refills: 0 | Status: DISCONTINUED | OUTPATIENT
Start: 2019-02-12 | End: 2019-02-19

## 2019-02-12 RX ORDER — AMLODIPINE BESYLATE 2.5 MG/1
5 TABLET ORAL DAILY
Qty: 0 | Refills: 0 | Status: DISCONTINUED | OUTPATIENT
Start: 2019-02-12 | End: 2019-02-13

## 2019-02-12 RX ORDER — CEFTRIAXONE 500 MG/1
1 INJECTION, POWDER, FOR SOLUTION INTRAMUSCULAR; INTRAVENOUS EVERY 24 HOURS
Qty: 0 | Refills: 0 | Status: COMPLETED | OUTPATIENT
Start: 2019-02-12 | End: 2019-02-16

## 2019-02-12 RX ORDER — SACCHAROMYCES BOULARDII 250 MG
250 POWDER IN PACKET (EA) ORAL
Qty: 0 | Refills: 0 | Status: DISCONTINUED | OUTPATIENT
Start: 2019-02-12 | End: 2019-02-19

## 2019-02-12 RX ORDER — AZITHROMYCIN 500 MG/1
500 TABLET, FILM COATED ORAL ONCE
Qty: 0 | Refills: 0 | Status: COMPLETED | OUTPATIENT
Start: 2019-02-12 | End: 2019-02-12

## 2019-02-12 RX ORDER — ASPIRIN/CALCIUM CARB/MAGNESIUM 324 MG
81 TABLET ORAL DAILY
Qty: 0 | Refills: 0 | Status: DISCONTINUED | OUTPATIENT
Start: 2019-02-12 | End: 2019-02-19

## 2019-02-12 RX ORDER — SIMVASTATIN 20 MG/1
20 TABLET, FILM COATED ORAL AT BEDTIME
Qty: 0 | Refills: 0 | Status: DISCONTINUED | OUTPATIENT
Start: 2019-02-12 | End: 2019-02-19

## 2019-02-12 RX ORDER — PANTOPRAZOLE SODIUM 20 MG/1
40 TABLET, DELAYED RELEASE ORAL
Qty: 0 | Refills: 0 | Status: DISCONTINUED | OUTPATIENT
Start: 2019-02-12 | End: 2019-02-19

## 2019-02-12 RX ORDER — TIOTROPIUM BROMIDE 18 UG/1
1 CAPSULE ORAL; RESPIRATORY (INHALATION) DAILY
Qty: 0 | Refills: 0 | Status: DISCONTINUED | OUTPATIENT
Start: 2019-02-12 | End: 2019-02-19

## 2019-02-12 RX ORDER — AZITHROMYCIN 500 MG/1
500 TABLET, FILM COATED ORAL EVERY 24 HOURS
Qty: 0 | Refills: 0 | Status: DISCONTINUED | OUTPATIENT
Start: 2019-02-12 | End: 2019-02-15

## 2019-02-12 RX ORDER — CEFTRIAXONE 500 MG/1
1 INJECTION, POWDER, FOR SOLUTION INTRAMUSCULAR; INTRAVENOUS ONCE
Qty: 0 | Refills: 0 | Status: COMPLETED | OUTPATIENT
Start: 2019-02-12 | End: 2019-02-12

## 2019-02-12 RX ORDER — ALBUTEROL 90 UG/1
1 AEROSOL, METERED ORAL EVERY 4 HOURS
Qty: 0 | Refills: 0 | Status: DISCONTINUED | OUTPATIENT
Start: 2019-02-12 | End: 2019-02-19

## 2019-02-12 RX ORDER — IPRATROPIUM/ALBUTEROL SULFATE 18-103MCG
3 AEROSOL WITH ADAPTER (GRAM) INHALATION EVERY 6 HOURS
Qty: 0 | Refills: 0 | Status: DISCONTINUED | OUTPATIENT
Start: 2019-02-12 | End: 2019-02-19

## 2019-02-12 RX ADMIN — Medication 40 MILLIGRAM(S): at 22:43

## 2019-02-12 RX ADMIN — Medication 125 MILLIGRAM(S): at 12:58

## 2019-02-12 RX ADMIN — CEFTRIAXONE 100 GRAM(S): 500 INJECTION, POWDER, FOR SOLUTION INTRAMUSCULAR; INTRAVENOUS at 13:51

## 2019-02-12 RX ADMIN — CEFTRIAXONE 1 GRAM(S): 500 INJECTION, POWDER, FOR SOLUTION INTRAMUSCULAR; INTRAVENOUS at 14:17

## 2019-02-12 RX ADMIN — AZITHROMYCIN 255 MILLIGRAM(S): 500 TABLET, FILM COATED ORAL at 14:17

## 2019-02-12 RX ADMIN — Medication 3 MILLILITER(S): at 21:48

## 2019-02-12 RX ADMIN — Medication 250 MILLIGRAM(S): at 18:23

## 2019-02-12 NOTE — H&P ADULT - PSH
Anal fistula    Ectopic pregnancy    H/O shoulder replacement  right  History of appendectomy    History of cholecystectomy    History of coronary angiogram  dr saenz  [facility]  2016  History of tonsillectomy    Liver cyst  surgical removal of multiple liver cysts

## 2019-02-12 NOTE — ED PROVIDER NOTE - OBJECTIVE STATEMENT
PT. present to ED c/o SOB x 4 days. Pt. has hx of COPD and HTN. Pt. has been using Oxygen for the past 6 months on a PRN basis. Pt. has increase her use of oxygen in the past week. Pt. denies any chest pain, but only when she coughs. PT. also had noted some fever for the past 2 days. PT. went to urgent care today and was given a breathing treatment.

## 2019-02-12 NOTE — H&P ADULT - ASSESSMENT
87y/o woman with PMH CAD, HTN, AS, MR, COPD active smoker on Home O2 PRN    1. Cough, sob, subjective fever, CXR findings confirmed by CTA- CAP-   - Rocephin + Zithromax  - ID and Pulm consult appreciated  - DUonebs, O2,   - Blood c/s testing  - RVP negative  - PE ruled out    2. COPD exacerbation acute- sec to above  - in addition to above, Solumedrol    3. MR  - Rpt ECHO  - given elevated BNP, assess for HF    4. Elevated BNP  - as above  - If needed will get cardiology consult    5. HTN, CAD  - Controlled, stable  - Cont home meds    Lovenox

## 2019-02-12 NOTE — ED ADULT NURSE REASSESSMENT NOTE - NS ED NURSE REASSESS COMMENT FT1
Pt reassessd.  Denies physical complaints at rest.  Pt on 3LNC.  No acute distress noted.  Will continue to monitor.

## 2019-02-12 NOTE — ED ADULT NURSE NOTE - CHIEF COMPLAINT QUOTE
Pt sent in from [facility] for SOB. Was found to be in the high 70's on RA. Given a duoneb and placed on 4l NC with sat up to 96%. Also reports productive cough and subjective fevers for the last couple of days. Was recently given home O2 that she does not wear all of the time.

## 2019-02-12 NOTE — ED PROVIDER NOTE - CLINICAL SUMMARY MEDICAL DECISION MAKING FREE TEXT BOX
PT. with cough/Sob/ increase use of home oxygen. Pneumonia seen plain film. Will admit and treat with ABX. Pt. stable on oxygen.

## 2019-02-12 NOTE — CONSULT NOTE ADULT - SUBJECTIVE AND OBJECTIVE BOX
[facility]  INFECTIOUS DISEASES AND INTERNAL MEDICINE at [place]  =======================================================  Akbar Gonzalez MD  Diplomates American Board of Internal Medicine and Infectious Diseases  =======================================================    MRN-[id number]  EL SHER     CC: Shortness of breath   HPI: 85y/o woman with PMH CAD, HTN, AS, MR, COPD active smoker, started on home o2 PRN about 6 months ago, comes in today with SOB x 4 days. In last few days she was using her O2 more often but still had SOB. She has nonproductive cough and fever x 2 d as well. No chest pain or any GI symptoms. Her CXR showed R sided infiltration so ID was called for recommendation.     PAST MEDICAL & SURGICAL HISTORY:  Liver, polycystic  Pericardial effusion  Mitral valve insufficiency  Aortic stenosis  Aortic valve insufficiency  Smoker unmotivated to quit  Humerus fracture, right, sequela: residual weakness  Left ventricular outflow tract obstruction  SOB (shortness of breath)  BENITEZ (dyspnea on exertion)  Unstable angina  Arthritis: osteoarthritis  Hiatal hernia  Cataract  HTN (hypertension)  HLD (hyperlipidemia)  COPD (chronic obstructive pulmonary disease)  Macular degeneration of both eyes  History of cholecystectomy  History of coronary angiogram: dr saenz  [facility]  2016  History of tonsillectomy  Liver cyst: surgical removal of multiple liver cysts  Anal fistula  Ectopic pregnancy  History of appendectomy  H/O shoulder replacement: right    Social Hx: smokes but lately trying to cut down, no other toxic habits.     FAMILY HISTORY:  Family history of MI (myocardial infarction) (Father)    Antibiotics:   Ceftriaxone  Azithromycin     Allergies  penicillin (Rash)  sulfur hexafluoride (Anaphylaxis; Rash)    REVIEW OF SYSTEMS:  CONSTITUTIONAL:  No Fever or chills  HEENT:  No diplopia or blurred vision.  No sore throat or runny nose.  CARDIOVASCULAR:  No chest pain, +SOB.  RESPIRATORY:  + cough, + shortness of breath  GASTROINTESTINAL:  No nausea, vomiting or diarrhea.  GENITOURINARY:  No dysuria, frequency or urgency. No Blood in urine  MUSCULOSKELETAL:  no joint aches, no muscle pain  SKIN:  No change in skin, hair or nails.  NEUROLOGIC:  No paresthesias, fasciculations, seizures or weakness.  PSYCHIATRIC:  No disorder of thought or mood.  ENDOCRINE:  No heat or cold intolerance, polyuria or polydipsia.  HEMATOLOGICAL:  No easy bruising or bleeding.     Physical Exam:  Vital Signs Last 24 Hrs  T(C): 37 (12 Feb 2019 11:54), Max: 37 (12 Feb 2019 11:54)  T(F): 98.6 (12 Feb 2019 11:54), Max: 98.6 (12 Feb 2019 11:54)  HR: 72 (12 Feb 2019 13:01) (72 - 82)  BP: 121/57 (12 Feb 2019 13:01) (121/57 - 140/70)  BP(mean): --  RR: 28 (12 Feb 2019 13:01) (16 - 28)  SpO2: 95% (12 Feb 2019 13:01) (95% - 97%)  Weight (kg): 56.7 (02-12 @ 11:54)  GEN: NAD, elderly and frail   HEENT: normocephalic and atraumatic. EOMI. PERRL.    NECK: Supple.  No lymphadenopathy   LUNGS: Bilateral crackles, R>L  HEART: Regular rate and rhythm 2/6 systolic murmur.  ABDOMEN: Soft, nontender, and nondistended.  Positive bowel sounds.    : No CVA tenderness  EXTREMITIES: trace edema.  NEUROLOGIC: grossly intact.  PSYCHIATRIC: Appropriate affect .  SKIN: No ulceration or induration present.    Labs:  02-12    139  |  97<L>  |  12.0  ----------------------------<  122<H>  3.8   |  32.0<H>  |  0.58    Ca    9.1      12 Feb 2019 13:14  Mg     2.0     02-12    TPro  6.8  /  Alb  3.4  /  TBili  0.3<L>  /  DBili  x   /  AST  18  /  ALT  13  /  AlkPhos  60  02-12                        12.8   6.3   )-----------( 274      ( 12 Feb 2019 13:14 )             40.3     PT/INR - ( 12 Feb 2019 13:14 )   PT: 13.3 sec;   INR: 1.15 ratio    PTT - ( 12 Feb 2019 13:14 )  PTT:33.3 sec    LIVER FUNCTIONS - ( 12 Feb 2019 13:14 )  Alb: 3.4 g/dL / Pro: 6.8 g/dL / ALK PHOS: 60 U/L / ALT: 13 U/L / AST: 18 U/L / GGT: x           CARDIAC MARKERS ( 12 Feb 2019 13:14 )  x     / <0.01 ng/mL / x     / x     / x        RECENT CULTURES:  02-12 @ 13:13      NotDetec      All imaging and other data have been reviewed.

## 2019-02-12 NOTE — CONSULT NOTE ADULT - SUBJECTIVE AND OBJECTIVE BOX
PULMONARY CONSULT NOTE      LEANNE SHER-[id number]    Patient is a 86y old  Female who presents with a chief complaint of fever for 2-3 days, increased oxygen requirement lightheadedness and cough  [facility] care  In truth, she hasnt felt well for weeks-months but doesnt know why/  She saw Dr. Mejia in late January but couldnt elucidate what was bothering her.  Needed more oxygen thru the day rather than just PRN use as usual    Patient has underlying COPD on Anoro and drug nebs, PRN use of oxygen  Hx aortic valve disease--followed by Dr. Olea and was to have ECHO soon  HTN  Macular degeneratioon  Hard of hearing  Hx smoking    INTERVAL HPI/OVERNIGHT EVENTS:    MEDICATIONS  (STANDING):      MEDICATIONS  (PRN):      Allergies    penicillin (Rash)  sulfur hexafluoride (Anaphylaxis; Rash)    Intolerances        PAST MEDICAL & SURGICAL HISTORY:  Liver, polycystic  Pericardial effusion  Mitral valve insufficiency  Aortic stenosis  Aortic valve insufficiency  Smoker unmotivated to quit  Humerus fracture, right, sequela: residual weakness  Left ventricular outflow tract obstruction  SOB (shortness of breath)  BENITEZ (dyspnea on exertion)  Unstable angina  Arthritis: osteoarthritis  Hiatal hernia  Cataract  HTN (hypertension)  HLD (hyperlipidemia)  COPD (chronic obstructive pulmonary disease)  Macular degeneration of both eyes  History of cholecystectomy  History of coronary angiogram: dr saenz  [facility]  2016  History of tonsillectomy  Liver cyst: surgical removal of multiple liver cysts  Anal fistula  Ectopic pregnancy  History of appendectomy  H/O shoulder replacement: right      FAMILY HISTORY:  Family history of MI (myocardial infarction) (Father)      SOCIAL HISTORY  Smoking History:     REVIEW OF SYSTEMS:    CONSTITUTIONAL:  As per HPI.    HEENT:  Eyes:  No diplopia or blurred vision. ENT:  No earache, sore throat or runny nose.    CARDIOVASCULAR:  No pressure, squeezing, tightness, heaviness or aching about the chest; no palpitations.    RESPIRATORY:  No cough, shortness of breath, PND or orthopnea. Mild SOBOE    GASTROINTESTINAL:  No nausea, vomiting or diarrhea.    GENITOURINARY:  No dysuria, frequency or urgency.    MUSCULOSKELETAL:  No joint pains    SKIN:  No new lesions.    NEUROLOGIC:  No paresthesias, fasciculations, seizures or weakness.    PSYCHIATRIC:  No disorder of thought or mood.    ENDOCRINE:  No heat or cold intolerance, polyuria or polydipsia.    HEMATOLOGICAL:  No easy bruising or bleeding.     Vital Signs Last 24 Hrs  T(C): 37 (12 Feb 2019 11:54), Max: 37 (12 Feb 2019 11:54)  T(F): 98.6 (12 Feb 2019 11:54), Max: 98.6 (12 Feb 2019 11:54)  HR: 72 (12 Feb 2019 13:01) (72 - 82)  BP: 121/57 (12 Feb 2019 13:01) (121/57 - 140/70)  BP(mean): --  RR: 28 (12 Feb 2019 13:01) (16 - 28)  SpO2: 95% (12 Feb 2019 13:01) (95% - 97%)    PHYSICAL EXAMINATION:    GENERAL: The patient is a well-developed, well-nourished _elderly lady____in no apparent distress but she complains of more dysponea than usual    HEENT: Head is normocephalic and atraumatic. Extraocular muscles are intact. Mucous membranes are moist.     NECK: Supple.     LUNGS: Rhonchi on Right side, few wheezes    HEART: Regular rate and rhythm without murmur.    ABDOMEN: Soft, nontender, and nondistended.  No hepatosplenomegaly is noted.    EXTREMITIES: Without any cyanosis, clubbing, rash, lesions or edema.    NEUROLOGIC: Grossly intact.    SKIN: No ulceration or induration present.      LABS:                        12.8   6.3   )-----------( 274      ( 12 Feb 2019 13:14 )             40.3     02-12    139  |  97<L>  |  12.0  ----------------------------<  122<H>  3.8   |  32.0<H>  |  0.58    Ca    9.1      12 Feb 2019 13:14  Mg     2.0     02-12    TPro  6.8  /  Alb  3.4  /  TBili  0.3<L>  /  DBili  x   /  AST  18  /  ALT  13  /  AlkPhos  60  02-12    PT/INR - ( 12 Feb 2019 13:14 )   PT: 13.3 sec;   INR: 1.15 ratio         PTT - ( 12 Feb 2019 13:14 )  PTT:33.3 sec      CARDIAC MARKERS ( 12 Feb 2019 13:14 )  x     / <0.01 ng/mL / x     / x     / x            Serum Pro-Brain Natriuretic Peptide: 2883 pg/mL (02-12-19 @ 13:14)          MICROBIOLOGY:    RADIOLOGY & ADDITIONAL STUDIES:< from: Xray Chest 1 View-PORTABLE IMMEDIATE (02.12.19 @ 13:03) >        INTERPRETATION:  History: Chest pain and dyspnea    Technique:  AP portable    Comparisons:  Chest x-ray dated 4/25/2017    Findings:     New acute coalescing interstitial and alveolar infiltrates   in right lung. Left lung is clear. No pleural effusions.  .    The   pulmonary vasculature and aorta are normal for age. Mild cardiomegaly .   Scoliosis thoracolumbar spine. Reverse right shoulder replacement    Impression: Diffuse infiltrates in right lung suggesting pneumonia.             < end of copied text >

## 2019-02-12 NOTE — ED ADULT NURSE NOTE - OBJECTIVE STATEMENT
Pt BIB ambulance for SOB x3-4 days. PT states has had low grade fever x2 days. Pt states feels lightheaded and SOB with ambulation.  Family states pt wears 2L O2 at home. Pt offers no c/o pain. CTM

## 2019-02-12 NOTE — ED ADULT TRIAGE NOTE - CHIEF COMPLAINT QUOTE
Pt sent in from [facility] for SOB. Was found to be in the high 70's on RA. Given a duoneb and placed on 4l NC with sat up to 96%. Also reports productive cough and subjective fevers for the last couple of days. Was recently given home O2 that she does not wear all of the time.

## 2019-02-12 NOTE — H&P ADULT - NSHPPHYSICALEXAM_GEN_ALL_CORE
Vital Signs Last 24 Hrs  T(C): 37.1 (02-12-19 @ 18:57), Max: 37.1 (02-12-19 @ 18:57)  T(F): 98.8 (02-12-19 @ 18:57), Max: 98.8 (02-12-19 @ 18:57)  HR: 76 (02-12-19 @ 18:57) (72 - 82)  BP: 114/56 (02-12-19 @ 18:57) (114/56 - 140/70)  BP(mean): --  RR: 24 (02-12-19 @ 18:57) (16 - 28)  SpO2: 95% (02-12-19 @ 18:57) (95% - 97%)  GENERAL: NAD, well-groomed, well-developed  HEAD:  Atraumatic, Normocephalic  EYES: EOMI, PERRLA, conjunctiva and sclera clear  NECK: Supple, No JVD, Normal thyroid  NERVOUS SYSTEM:  Alert & Oriented X3, Motor Strength 5/5 B/L upper and lower extremities; DTRs 2+ intact and symmetric  CHEST/LUNG: Clear to percussion bilaterally; No rales, rhonchi, wheezing, or rubs  HEART: Regular rate and rhythm; No murmurs, rubs, or gallops  ABDOMEN: Soft, Nontender, Nondistended; Bowel sounds present  EXTREMITIES:  2+ Peripheral Pulses, No clubbing, cyanosis, or edema  SKIN: No rashes or lesions Vital Signs Last 24 Hrs  T(C): 37.1 (02-12-19 @ 18:57), Max: 37.1 (02-12-19 @ 18:57)  T(F): 98.8 (02-12-19 @ 18:57), Max: 98.8 (02-12-19 @ 18:57)  HR: 76 (02-12-19 @ 18:57) (72 - 82)  BP: 114/56 (02-12-19 @ 18:57) (114/56 - 140/70)  BP(mean): --  RR: 24 (02-12-19 @ 18:57) (16 - 28)  SpO2: 95% (02-12-19 @ 18:57) (95% - 97%)  GENERAL: Dyspneic, well-groomed, well-developed, frail built  HEAD:  Atraumatic, Normocephalic  EYES: EOMI, conjunctiva and sclera clear  NECK:  No JVD, Normal thyroid  NERVOUS SYSTEM:  Alert & Oriented X 3, Motor Strength 4/5 B/L upper and lower extremities  CHEST/LUNG: Scattered rales and rhonchi gutierrez, wheezing, or rubs  HEART: Regular rate and rhythm; Systolic murmur +, No rubs, or gallops  ABDOMEN: Soft, Nontender, Nondistended; Bowel sounds present  EXTREMITIES:  2+ Peripheral Pulses, No clubbing, cyanosis, or edema  SKIN: No rashes or lesions

## 2019-02-12 NOTE — H&P ADULT - HISTORY OF PRESENT ILLNESS
87y/o woman with PMH CAD, HTN, AS, MR, COPD active smoker, started on home o2 PRN about 6 months ago, comes in today with SOB x 4 days. In last few days she was using her O2 more often but still had SOB. She has nonproductive cough and fever x 2 d as well. No chest pain or any GI symptoms.    Patient has underlying COPD on Anoro and drug nebs, PRN use of oxygen at Home  Hx aortic valve disease--followed by Dr. Olea and was to have ECHO soon  HTN  Macular degeneration    She is severely [place] and history obtained from daughter at bedside    SH- active smoker, denies alcohol use, lives with daughter  FH- she cannot remember any medical history of her family

## 2019-02-13 LAB
CULTURE RESULTS: NO GROWTH — SIGNIFICANT CHANGE UP
ENA SCL70 AB SER-ACNC: <0.2 — SIGNIFICANT CHANGE UP
RHEUMATOID FACT SERPL-ACNC: 15 IU/ML — HIGH (ref 0–13)
SPECIMEN SOURCE: SIGNIFICANT CHANGE UP

## 2019-02-13 PROCEDURE — 99232 SBSQ HOSP IP/OBS MODERATE 35: CPT

## 2019-02-13 PROCEDURE — 99233 SBSQ HOSP IP/OBS HIGH 50: CPT

## 2019-02-13 RX ORDER — FUROSEMIDE 40 MG
20 TABLET ORAL DAILY
Qty: 0 | Refills: 0 | Status: DISCONTINUED | OUTPATIENT
Start: 2019-02-13 | End: 2019-02-15

## 2019-02-13 RX ORDER — ACETAMINOPHEN 500 MG
650 TABLET ORAL EVERY 6 HOURS
Qty: 0 | Refills: 0 | Status: DISCONTINUED | OUTPATIENT
Start: 2019-02-13 | End: 2019-02-19

## 2019-02-13 RX ADMIN — CEFTRIAXONE 100 GRAM(S): 500 INJECTION, POWDER, FOR SOLUTION INTRAMUSCULAR; INTRAVENOUS at 11:29

## 2019-02-13 RX ADMIN — Medication 40 MILLIGRAM(S): at 13:03

## 2019-02-13 RX ADMIN — ENOXAPARIN SODIUM 40 MILLIGRAM(S): 100 INJECTION SUBCUTANEOUS at 06:49

## 2019-02-13 RX ADMIN — Medication 40 MILLIGRAM(S): at 06:50

## 2019-02-13 RX ADMIN — PANTOPRAZOLE SODIUM 40 MILLIGRAM(S): 20 TABLET, DELAYED RELEASE ORAL at 06:50

## 2019-02-13 RX ADMIN — Medication 40 MILLIGRAM(S): at 21:16

## 2019-02-13 RX ADMIN — Medication 3 MILLILITER(S): at 02:41

## 2019-02-13 RX ADMIN — Medication 250 MILLIGRAM(S): at 06:48

## 2019-02-13 RX ADMIN — Medication 3 MILLILITER(S): at 15:49

## 2019-02-13 RX ADMIN — ATENOLOL 25 MILLIGRAM(S): 25 TABLET ORAL at 07:00

## 2019-02-13 RX ADMIN — Medication 3 MILLILITER(S): at 08:20

## 2019-02-13 RX ADMIN — Medication 250 MILLIGRAM(S): at 13:03

## 2019-02-13 RX ADMIN — Medication 20 MILLIGRAM(S): at 11:28

## 2019-02-13 RX ADMIN — Medication 3 MILLILITER(S): at 20:17

## 2019-02-13 RX ADMIN — AZITHROMYCIN 255 MILLIGRAM(S): 500 TABLET, FILM COATED ORAL at 11:28

## 2019-02-13 NOTE — PROGRESS NOTE ADULT - SUBJECTIVE AND OBJECTIVE BOX
HEALTH ISSUES - PROBLEM Dx:  5y/o woman with PMH CAD, HTN, AS, MR, COPD active smoker, started on home o2 PRN about 6 months ago, comes in today with SOB x 4 days.    CC- PNA, COPD    INTERVAL HPI/ OVERNIGHT EVENTS:    dyspnea much improved, visibly   comfortable  states she feels much better      REVIEW OF SYSTEMS:    fever - cough - sob -    Vital Signs Last 24 Hrs  T(C): 36.7 (2019 16:07), Max: 37.1 (2019 18:57)  T(F): 98 (2019 16:07), Max: 98.8 (2019 18:57)  HR: 68 (2019 16:15) (66 - 86)  BP: 113/61 (2019 16:07) (104/62 - 133/59)  BP(mean): --  RR: 19 (2019 16:07) (18 - 24)  SpO2: 94% (2019 16:15) (93% - 97%)    PHYSICAL EXAM-  GENERAL: Dyspneic, well-groomed, well-developed, frail built  	HEAD:  Atraumatic, Normocephalic  	EYES: EOMI, conjunctiva and sclera clear  	NECK:  No JVD, Normal thyroid  	NERVOUS SYSTEM:  Alert & Oriented X 3, Motor Strength 4/5 B/L upper and lower extremities  	CHEST/LUNG: Scattered rales and rhonchi gutierrez, No wheezing, or rubs  	HEART: Regular rate and rhythm; Systolic murmur +, No rubs, or gallops  	ABDOMEN: Soft, Nontender, Nondistended; Bowel sounds present  	EXTREMITIES:  2+ Peripheral Pulses, No clubbing, cyanosis, or edema  SKIN: No rashes or lesion    LABS:                        12.8   6.3   )-----------( 274      ( 2019 13:14 )             40.3     02-12    139  |  97<L>  |  12.0  ----------------------------<  122<H>  3.8   |  32.0<H>  |  0.58    Ca    9.1      2019 13:14  Mg     2.0     02-12    TPro  6.8  /  Alb  3.4  /  TBili  0.3<L>  /  DBili  x   /  AST  18  /  ALT  13  /  AlkPhos  60  02-12    PT/INR - ( 2019 13:14 )   PT: 13.3 sec;   INR: 1.15 ratio         PTT - ( 2019 13:14 )  PTT:33.3 sec  Urinalysis Basic - ( 2019 18:21 )    Color: Yellow / Appearance: Clear / S.010 / pH: x  Gluc: x / Ketone: Negative  / Bili: Negative / Urobili: 1 mg/dL   Blood: x / Protein: 30 mg/dL / Nitrite: Negative   Leuk Esterase: Small / RBC: 3-5 /HPF / WBC 3-5   Sq Epi: x / Non Sq Epi: Occasional / Bacteria: Occasional    Assessment and Plan

## 2019-02-13 NOTE — CONSULT NOTE ADULT - ASSESSMENT
85y/o woman with PMH CAD, HTN, AS, MR, COPD active smoker, started on home o2 PRN about 6 months ago, comes in today with SOB, cough and fever and CXR shows right sided pneumonia.    Pneumonia  PCN allergy    - Blood culture x 2  - Sputum culture if feasible  - Urinary legionella Ag  - Procalcitonin   - Chest CT  - No reaction to ceftriaxone  - Continue ceftriaxone 1gm daily  - Continue Azithromycin 500mg daily.   - TTE to evaluate EF and due to valvulopathy and rule out CHF    Will follow.
1. 84 yo F with 02 dependent copd presents with increasing dyspnea. CXR and CT show right lung extensive infiltrates consistent with PNA-started on antibiotics.  2. aortic stenosis-at least moderately severe by last yrs echo with nl LVEF and LVH. Repeat echo to reassess. Likely element of diastolic failure given age/lvh/AS etc. Would try low dose diuretic.  3. hx of hpt but current bp is 100 systolic-dc norvasc  4. macular degen.
87 yo white lady with three-four days of fever, increased oxygen requirements and incr dyspnea    She has a new, extensive interstitial and alveolar infiltrate in right lung.  However, afebrile now, now WBC's and no sputum.  She has not been feeling herself for weeks to months    Agree with treatment for pneumontis but cannot rule out underlying neoplasm or other more indolent process at this time.    Recommend repeat CT--obtain CTA please.    Hx of AoV disease and followed by Dr. Olea.  Note incr BNP  Will need ECHO here  Recommend [facility]  F/u    We will follow patient

## 2019-02-13 NOTE — PROGRESS NOTE ADULT - ASSESSMENT
85y/o woman with PMH CAD, HTN, AS, MR, COPD active smoker on Home O2 PRN    1. Cough, sob, subjective fever, CXR findings confirmed by CTA- CAP-   - Rocephin + Zithromax  - ID and Pulm consult appreciated  - DUonebs, O2,   - Blood c/s testing  - RVP negative  - PE ruled out    2. COPD exacerbation acute- sec to above  - in addition to above, Solumedrol    3. MR  - Rpt ECHO  - given elevated BNP, assess for HF    4. Elevated BNP  - as above  - If needed will get cardiology consult    5. HTN, CAD  - Controlled, stable  - Cont home meds    Lovenox

## 2019-02-13 NOTE — PROGRESS NOTE ADULT - SUBJECTIVE AND OBJECTIVE BOX
[facility]  INFECTIOUS DISEASES AND INTERNAL MEDICINE at [place]  =======================================================  Akbar Gonzalez MD  Diplomates American Board of Internal Medicine and Infectious Diseases  =======================================================    [id number]  EL SHER     Follow up: Pneumonia    No new event or complaint. SOB but no fever or chest pain.     PAST MEDICAL & SURGICAL HISTORY:  Liver, polycystic  Pericardial effusion  Mitral valve insufficiency  Aortic stenosis  Aortic valve insufficiency  Smoker unmotivated to quit  Humerus fracture, right, sequela: residual weakness  Left ventricular outflow tract obstruction  SOB (shortness of breath)  BENITEZ (dyspnea on exertion)  Unstable angina  Arthritis: osteoarthritis  Hiatal hernia  Cataract  HTN (hypertension)  HLD (hyperlipidemia)  COPD (chronic obstructive pulmonary disease)  Macular degeneration of both eyes  History of cholecystectomy  History of coronary angiogram: dr saenz  [facility]  2016  History of tonsillectomy  Liver cyst: surgical removal of multiple liver cysts  Anal fistula  Ectopic pregnancy  History of appendectomy  H/O shoulder replacement: right    Social Hx: smokes but lately trying to cut down, no other toxic habits.     FAMILY HISTORY:  Family history of MI (myocardial infarction) (Father)    Antibiotics:   Ceftriaxone  Azithromycin     Allergies  penicillin (Rash)  sulfur hexafluoride (Anaphylaxis; Rash)    REVIEW OF SYSTEMS:  CONSTITUTIONAL:  No Fever or chills  HEENT:  No diplopia or blurred vision.  No sore throat or runny nose.  CARDIOVASCULAR:  No chest pain, +SOB.  RESPIRATORY:  + cough, + shortness of breath  GASTROINTESTINAL:  No nausea, vomiting or diarrhea.  GENITOURINARY:  No dysuria, frequency or urgency. No Blood in urine  MUSCULOSKELETAL:  no joint aches, no muscle pain  SKIN:  No change in skin, hair or nails.  NEUROLOGIC:  No paresthesias, fasciculations, seizures or weakness.  PSYCHIATRIC:  No disorder of thought or mood.  ENDOCRINE:  No heat or cold intolerance, polyuria or polydipsia.  HEMATOLOGICAL:  No easy bruising or bleeding.     Physical Exam:  Vital Signs Last 24 Hrs  T(C): 36.8 (2019 07:47), Max: 37.1 (2019 18:57)  T(F): 98.3 (2019 07:47), Max: 98.8 (2019 18:57)  HR: 70 (2019 08:35) (69 - 86)  BP: 104/62 (2019 07:47) (104/62 - 140/70)  BP(mean): --  RR: 18 (2019 07:47) (16 - 28)  SpO2: 95% (2019 08:35) (93% - 97%)  GEN: NAD, elderly and frail   HEENT: normocephalic and atraumatic. EOMI. PERRL.    NECK: Supple.  No lymphadenopathy   LUNGS: Bilateral crackles, R>L  HEART: Regular rate and rhythm 2/6 systolic murmur.  ABDOMEN: Soft, nontender, and nondistended.  Positive bowel sounds.    : No CVA tenderness  EXTREMITIES: trace edema.  NEUROLOGIC: grossly intact.  PSYCHIATRIC: Appropriate affect .  SKIN: No ulceration or induration present.    Labs:      139  |  97<L>  |  12.0  ----------------------------<  122<H>  3.8   |  32.0<H>  |  0.58    Ca    9.1      2019 13:14  Mg     2.0         TPro  6.8  /  Alb  3.4  /  TBili  0.3<L>  /  DBili  x   /  AST  18  /  ALT  13  /  AlkPhos  60  02                        12.8   6.3   )-----------( 274      ( 2019 13:14 )             40.3     PT/INR - ( 2019 13:14 )   PT: 13.3 sec;   INR: 1.15 ratio    PTT - ( 2019 13:14 )  PTT:33.3 sec  Urinalysis Basic - ( 2019 18:21 )    Color: Yellow / Appearance: Clear / S.010 / pH: x  Gluc: x / Ketone: Negative  / Bili: Negative / Urobili: 1 mg/dL   Blood: x / Protein: 30 mg/dL / Nitrite: Negative   Leuk Esterase: Small / RBC: 3-5 /HPF / WBC 3-5   Sq Epi: x / Non Sq Epi: Occasional / Bacteria: Occasional    LIVER FUNCTIONS - ( 2019 13:14 )  Alb: 3.4 g/dL / Pro: 6.8 g/dL / ALK PHOS: 60 U/L / ALT: 13 U/L / AST: 18 U/L / GGT: x           CARDIAC MARKERS ( 2019 13:14 )  x     / <0.01 ng/mL / x     / x     / x        RECENT CULTURES:   @ 13:13      NotDetec    All imaging and other data have been reviewed.

## 2019-02-13 NOTE — PROGRESS NOTE ADULT - ASSESSMENT
87y/o woman with PMH CAD, HTN, AS, MR, COPD active smoker, started on home o2 PRN about 6 months ago, comes in today with SOB, cough and fever and CXR shows right sided pneumonia.    Pneumonia  PCN allergy    - Blood culture pending   - Sputum culture  - Urinary legionella Ag pending   - Procalcitonin 0.05  - Chest CT result noted with multilobar opacities   - No reaction to ceftriaxone  - Continue ceftriaxone 1gm daily  - Continue Azithromycin 500mg daily.   - TTE to evaluate EF and due to valvulopathy and rule out CHF contributing to SOB and lung opacities.     Will follow.

## 2019-02-13 NOTE — PROGRESS NOTE ADULT - ASSESSMENT
Severe COPD, home 02, worsening dyspnea  bronchial wall thickening and mucus plugging, severe emphysema  new infiltrates , R predominant with septal thickening, bilat effusions  favor pna plus atypical CHF component, suspect sig AS by exam, echo pending  subcarinal fullness noted ,suspect reactive adenopathy, will follow  not bronchospastic currently, BNP, procal noted  continue abx per ID, wean medrol, continue nebs, 02  lasix added, await echo, cardiology called  prognosis guarded, given age co morbidity

## 2019-02-13 NOTE — CONSULT NOTE ADULT - SUBJECTIVE AND OBJECTIVE BOX
Chief Complaint: progressive dyspnea    HPI: Old records reviewed. 86 yo F with advanced copd (still smokes and is on 02) presents w several days of increased dsypnea and ? sputum. Denies hemoptysis or fever. PMH + for aortic stenosis "moderate" and low grade CAD on cath 2018 with nl LVEF. Denies DM/cva/syncope/seizure/renal/hepatic/heme or endocrine disease. Surgery: hysterectomy/ectopic pregnancy/GB. Allergy to pcn  and sulfa. ROS + for othropnea (sleeps in a recliner)/leg edema and dizziness. 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 Lives with older invalid sister. Still drives locally. OP meds:    PAST MEDICAL & SURGICAL HISTORY:  Liver, polycystic  Pericardial effusion  Mitral valve insufficiency  Aortic stenosis  Aortic valve insufficiency  Smoker unmotivated to quit  Humerus fracture, right, sequela: residual weakness  Left ventricular outflow tract obstruction  SOB (shortness of breath)  BENITEZ (dyspnea on exertion)  Unstable angina  Arthritis: osteoarthritis  Hiatal hernia  Cataract  HTN (hypertension)  HLD (hyperlipidemia)  COPD (chronic obstructive pulmonary disease)  Macular degeneration of both eyes  History of cholecystectomy  History of coronary angiogram: dr saenz  [facility]    History of tonsillectomy  Liver cyst: surgical removal of multiple liver cysts  Anal fistula  Ectopic pregnancy  History of appendectomy  H/O shoulder replacement: right      PREVIOUS DIAGNOSTIC TESTING:      ECHO  FINDINGS:    STRESS  FINDINGS:    CATHETERIZATION  FINDINGS:    MEDICATIONS  (STANDING):  ALBUTerol    90 MICROgram(s) HFA Inhaler 1 Puff(s) Inhalation every 4 hours  ALBUTerol/ipratropium for Nebulization 3 milliLiter(s) Nebulizer every 6 hours  amLODIPine   Tablet 5 milliGRAM(s) Oral daily  aspirin enteric coated 81 milliGRAM(s) Oral daily  ATENolol  Tablet 25 milliGRAM(s) Oral daily  azithromycin  IVPB 500 milliGRAM(s) IV Intermittent every 24 hours  cefTRIAXone   IVPB 1 Gram(s) IV Intermittent every 24 hours  enoxaparin Injectable 40 milliGRAM(s) SubCutaneous every 24 hours  furosemide   Injectable 20 milliGRAM(s) IV Push daily  methylPREDNISolone sodium succinate Injectable 40 milliGRAM(s) IV Push every 8 hours  pantoprazole    Tablet 40 milliGRAM(s) Oral before breakfast  saccharomyces boulardii 250 milliGRAM(s) Oral two times a day  sertraline 25 milliGRAM(s) Oral daily  simvastatin 20 milliGRAM(s) Oral at bedtime  tiotropium 18 MICROgram(s) Capsule 1 Capsule(s) Inhalation daily    MEDICATIONS  (PRN):      FAMILY HISTORY:  Family history of MI (myocardial infarction)      SOCIAL HISTORY:    CIGARETTES:    ALCOHOL:    ROS: Negative other than as mentioned in HPI.    Vital Signs Last 24 Hrs  T(C): 36.8 (2019 07:47), Max: 37.1 (2019 18:57)  T(F): 98.3 (2019 07:47), Max: 98.8 (2019 18:57)  HR: 69 (2019 07:47) (69 - 86)  BP: 104/62 (2019 07:47) (104/62 - 140/70)  BP(mean): --  RR: 18 (2019 07:47) (16 - 28)  SpO2: 96% (2019 07:47) (93% - 97%)    PHYSICAL EXAM:  General: Appears well developed, well nourished alert and cooperative.  HEENT: Head; normocephalic, atraumatic.  Eyes;   Pupils reactive, cornea wnl.  Neck; Supple, no nodes adenopathy, no NVD or carotid bruit or thyromegaly.  CARDIOVASCULAR; No murmur, rub, gallop or lift. Normal S1 and S2.  LUNGS; No rales, rhonchi or wheeze. Normal breath sounds bilaterally.  ABDOMEN ; Soft, nontender without mass or organomegaly. bowel sounds normoactive.  EXTREMITIES; No clubbing, cyanosis or edema. Distal pulses wnl. ROM normal.  SKIN; warm and dry with normal turgor.  NEURO; Alert/oriented x 3/normal motor exam. No pathologic reflexes.    PSYCH; normal affect.            INTERPRETATION OF TELEMETRY:    ECG:    I&O's Detail      LABS:                        12.8   6.3   )-----------( 274      ( 2019 13:14 )             40.3     02-12    139  |  97<L>  |  12.0  ----------------------------<  122<H>  3.8   |  32.0<H>  |  0.58    Ca    9.1      2019 13:14  Mg     2.0     02-12    TPro  6.8  /  Alb  3.4  /  TBili  0.3<L>  /  DBili  x   /  AST  18  /  ALT  13  /  AlkPhos  60  02-12    CARDIAC MARKERS ( 2019 13:14 )  x     / <0.01 ng/mL / x     / x     / x          PT/INR - ( 2019 13:14 )   PT: 13.3 sec;   INR: 1.15 ratio         PTT - ( 2019 13:14 )  PTT:33.3 sec  Urinalysis Basic - ( 2019 18:21 )    Color: Yellow / Appearance: Clear / S.010 / pH: x  Gluc: x / Ketone: Negative  / Bili: Negative / Urobili: 1 mg/dL   Blood: x / Protein: 30 mg/dL / Nitrite: Negative   Leuk Esterase: Small / RBC: 3-5 /HPF / WBC 3-5   Sq Epi: x / Non Sq Epi: Occasional / Bacteria: Occasional      I&O's Summary      RADIOLOGY & ADDITIONAL STUDIES: Chief Complaint: progressive dyspnea    HPI: Old records reviewed. 86 yo F with advanced copd (still smokes and is on 02) presents w several days of increased dsypnea and ? sputum. Denies hemoptysis or fever. PMH + for aortic stenosis "moderate" and low grade CAD on cath 2018 with nl LVEF. Denies DM/cva/syncope/seizure/renal/hepatic/heme or endocrine disease. Surgery: hysterectomy/ectopic pregnancy/GB. Allergy to pcn  and sulfa. ROS + for othropnea (sleeps in a recliner)/leg edema and dizziness. 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 Lives with older invalid sister. Still drives locally. OP meds: atenolol 25/81 asa/inhalers/norvasc 5/sertraline 25 and zocor20.    PAST MEDICAL & SURGICAL HISTORY:  Liver, polycystic  Pericardial effusion  Mitral valve insufficiency  Aortic stenosis  Aortic valve insufficiency  Smoker unmotivated to quit  Humerus fracture, right, sequela: residual weakness  Left ventricular outflow tract obstruction  SOB (shortness of breath)  BENITEZ (dyspnea on exertion)  Unstable angina  Arthritis: osteoarthritis  Hiatal hernia  Cataract  HTN (hypertension)  HLD (hyperlipidemia)  COPD (chronic obstructive pulmonary disease)  Macular degeneration of both eyes  History of cholecystectomy  History of coronary angiogram: dr saenz  [facility]  2016  History of tonsillectomy  Liver cyst: surgical removal of multiple liver cysts  Anal fistula  Ectopic pregnancy  History of appendectomy  H/O shoulder replacement: right      PREVIOUS DIAGNOSTIC TESTING:      ECHO  FINDINGS: 2018; ef 65+%/mac/mod AS.    STRESS  FINDINGS:    CATHETERIZATION  FINDINGS:    MEDICATIONS  (STANDING):  ALBUTerol    90 MICROgram(s) HFA Inhaler 1 Puff(s) Inhalation every 4 hours  ALBUTerol/ipratropium for Nebulization 3 milliLiter(s) Nebulizer every 6 hours  amLODIPine   Tablet 5 milliGRAM(s) Oral daily  aspirin enteric coated 81 milliGRAM(s) Oral daily  ATENolol  Tablet 25 milliGRAM(s) Oral daily  azithromycin  IVPB 500 milliGRAM(s) IV Intermittent every 24 hours  cefTRIAXone   IVPB 1 Gram(s) IV Intermittent every 24 hours  enoxaparin Injectable 40 milliGRAM(s) SubCutaneous every 24 hours  furosemide   Injectable 20 milliGRAM(s) IV Push daily  methylPREDNISolone sodium succinate Injectable 40 milliGRAM(s) IV Push every 8 hours  pantoprazole    Tablet 40 milliGRAM(s) Oral before breakfast  saccharomyces boulardii 250 milliGRAM(s) Oral two times a day  sertraline 25 milliGRAM(s) Oral daily  simvastatin 20 milliGRAM(s) Oral at bedtime  tiotropium 18 MICROgram(s) Capsule 1 Capsule(s) Inhalation daily    MEDICATIONS  (PRN):      FAMILY HISTORY:  Family history of MI (myocardial infarction)      SOCIAL HISTORY: see above    CIGARETTES: +    ALCOHOL: 0    ROS: Negative other than as mentioned in HPI.    Vital Signs Last 24 Hrs  T(C): 36.8 (2019 07:47), Max: 37.1 (2019 18:57)  T(F): 98.3 (2019 07:47), Max: 98.8 (2019 18:57)  HR: 70 reg (2019 07:47) (69 - 86)  BP: 100/70 RA manually (2019 07:47) (104/62 - 140/70)  BP(mean): --  RR: 18 (2019 07:47) (16 - 28)  SpO2: 96% (2019 07:47) (93% - 97%)    PHYSICAL EXAM:  General: Appears well developed, well nourished alert and cooperative. Alert afebrile thin elderly F nad.  HEENT: Head; normocephalic, atraumatic.  Eyes;   Pupils reactive, cornea wnl. bilat arcus senilis  Neck; Supple, no nodes adenopathy, no NVD or carotid bruit or thyromegaly.  CARDIOVASCULAR; 2/6 basal systolic murmur with decreased S2. No dioastolic murmur, rub, gallop or lift. Normal S1.  LUNGS; extensive crackles right lung. no wheeze.  ABDOMEN ; Soft, nontender without mass or organomegaly. bowel sounds normoactive.  EXTREMITIES; No clubbing, cyanosis or edema. Distal pulses not felt. ROM normal.  SKIN; warm and dry with normal turgor.  NEURO; Alert/oriented x 3/normal motor exam. No pathologic reflexes.    PSYCH; normal affect.            INTERPRETATION OF TELEMETRY:    ECG: SR w inverted T in 2/3/f/V3-6  cxr: extensive infiltrate right lung also seen on CT with some small effusions and no PE.    I&O's Detail      LABS:                        12.8   6.3   )-----------( 274      ( 2019 13:14 )             40.3     02-12    139  |  97<L>  |  12.0  ----------------------------<  122<H>  3.8   |  32.0<H>  |  0.58    Ca    9.1      2019 13:14  Mg     2.0     -12    TPro  6.8  /  Alb  3.4  /  TBili  0.3<L>  /  DBili  x   /  AST  18  /  ALT  13  /  AlkPhos  60  02-12    CARDIAC MARKERS ( 2019 13:14 )  x     / <0.01 ng/mL / x     / x     / x          PT/INR - ( 2019 13:14 )   PT: 13.3 sec;   INR: 1.15 ratio         PTT - ( 2019 13:14 )  PTT:33.3 sec  Urinalysis Basic - ( 2019 18:21 )    Color: Yellow / Appearance: Clear / S.010 / pH: x  Gluc: x / Ketone: Negative  / Bili: Negative / Urobili: 1 mg/dL   Blood: x / Protein: 30 mg/dL / Nitrite: Negative   Leuk Esterase: Small / RBC: 3-5 /HPF / WBC 3-5   Sq Epi: x / Non Sq Epi: Occasional / Bacteria: Occasional      I&O's Summary      RADIOLOGY & ADDITIONAL STUDIES:

## 2019-02-13 NOTE — PROGRESS NOTE ADULT - SUBJECTIVE AND OBJECTIVE BOX
PULMONARY PROGRESS NOTE      LEANNE SHER-[id number]    Patient is a 86y old  Female who presents with a chief complaint of     INTERVAL HPI/OVERNIGHT EVENTS:  no fever, chill, chest pain  acutely worse dyspnea over last few days  some discolored sputum  MEDICATIONS  (STANDING):  ALBUTerol    90 MICROgram(s) HFA Inhaler 1 Puff(s) Inhalation every 4 hours  ALBUTerol/ipratropium for Nebulization 3 milliLiter(s) Nebulizer every 6 hours  amLODIPine   Tablet 5 milliGRAM(s) Oral daily  aspirin enteric coated 81 milliGRAM(s) Oral daily  ATENolol  Tablet 25 milliGRAM(s) Oral daily  azithromycin  IVPB 500 milliGRAM(s) IV Intermittent every 24 hours  cefTRIAXone   IVPB 1 Gram(s) IV Intermittent every 24 hours  enoxaparin Injectable 40 milliGRAM(s) SubCutaneous every 24 hours  methylPREDNISolone sodium succinate Injectable 40 milliGRAM(s) IV Push every 8 hours  pantoprazole    Tablet 40 milliGRAM(s) Oral before breakfast  saccharomyces boulardii 250 milliGRAM(s) Oral two times a day  sertraline 25 milliGRAM(s) Oral daily  simvastatin 20 milliGRAM(s) Oral at bedtime  tiotropium 18 MICROgram(s) Capsule 1 Capsule(s) Inhalation daily      MEDICATIONS  (PRN):      Allergies    penicillin (Rash)  sulfur hexafluoride (Anaphylaxis; Rash)    Intolerances        PAST MEDICAL & SURGICAL HISTORY:  Liver, polycystic  Pericardial effusion  Mitral valve insufficiency  Aortic stenosis  Aortic valve insufficiency  Smoker unmotivated to quit  Humerus fracture, right, sequela: residual weakness  Left ventricular outflow tract obstruction  SOB (shortness of breath)  BENITEZ (dyspnea on exertion)  Unstable angina  Arthritis: osteoarthritis  Hiatal hernia  Cataract  HTN (hypertension)  HLD (hyperlipidemia)  COPD (chronic obstructive pulmonary disease)  Macular degeneration of both eyes  History of cholecystectomy  History of coronary angiogram: dr saenz  [facility]  2016  History of tonsillectomy  Liver cyst: surgical removal of multiple liver cysts  Anal fistula  Ectopic pregnancy  History of appendectomy  H/O shoulder replacement: right      SOCIAL HISTORY  Smoking History:       REVIEW OF SYSTEMS:    CONSTITUTIONAL:  No distress    HEENT:  Eyes:  No diplopia or blurred vision. ENT:  No earache, sore throat or runny nose.    CARDIOVASCULAR:  No pressure, squeezing, tightness, heaviness or aching about the chest; no palpitations.    RESPIRATORY:  see HPI    GASTROINTESTINAL:  No nausea, vomiting or diarrhea.    GENITOURINARY:  No dysuria, frequency or urgency.    NEUROLOGIC:  No paresthesias, fasciculations, seizures or weakness.    PSYCHIATRIC:  No disorder of thought or mood.    Vital Signs Last 24 Hrs  T(C): 36.8 (2019 07:47), Max: 37.1 (2019 18:57)  T(F): 98.3 (2019 07:47), Max: 98.8 (2019 18:57)  HR: 69 (2019 07:47) (69 - 86)  BP: 104/62 (2019 07:47) (104/62 - 140/70)  BP(mean): --  RR: 18 (2019 07:47) (16 - 28)  SpO2: 96% (2019 07:47) (93% - 97%)    PHYSICAL EXAMINATION:    GENERAL: The patient is awake and alert in no apparent distress, mildly dyspneic at rest, no paradox    HEENT: Head is normocephalic and atraumatic. Extraocular muscles are intact. Mucous membranes are moist.    NECK: Supple.    LUNGS: moderate air entry bilaterally without wheezing, rales or rhonchi; respirations unlabored    HEART: Regular rate and rhythm with 2-3/6 systolic  murmur.    ABDOMEN: Soft, nontender, and nondistended.      EXTREMITIES: Without any cyanosis, clubbing, rash, lesions or edema.    NEUROLOGIC: Grossly intact.    LABS:                        12.8   6.3   )-----------( 274      ( 2019 13:14 )             40.3     02-12    139  |  97<L>  |  12.0  ----------------------------<  122<H>  3.8   |  32.0<H>  |  0.58    Ca    9.1      2019 13:14  Mg     2.0     02-12    TPro  6.8  /  Alb  3.4  /  TBili  0.3<L>  /  DBili  x   /  AST  18  /  ALT  13  /  AlkPhos  60  02-12    PT/INR - ( 2019 13:14 )   PT: 13.3 sec;   INR: 1.15 ratio         PTT - ( 2019 13:14 )  PTT:33.3 sec  Urinalysis Basic - ( 2019 18:21 )    Color: Yellow / Appearance: Clear / S.010 / pH: x  Gluc: x / Ketone: Negative  / Bili: Negative / Urobili: 1 mg/dL   Blood: x / Protein: 30 mg/dL / Nitrite: Negative   Leuk Esterase: Small / RBC: 3-5 /HPF / WBC 3-5   Sq Epi: x / Non Sq Epi: Occasional / Bacteria: Occasional        CARDIAC MARKERS ( 2019 13:14 )  x     / <0.01 ng/mL / x     / x     / x            Serum Pro-Brain Natriuretic Peptide: 2883 pg/mL (19 @ 13:14)      Procalcitonin, Serum: 0.05 ng/mL (19 @ 18:22)      MICROBIOLOGY:    RADIOLOGY & ADDITIONAL STUDIES:  cxrs and CT scans reviewed and compared

## 2019-02-14 LAB
ANA PAT FLD IF-IMP: ABNORMAL
ANA TITR SER: ABNORMAL
ANION GAP SERPL CALC-SCNC: 10 MMOL/L — SIGNIFICANT CHANGE UP (ref 5–17)
BUN SERPL-MCNC: 19 MG/DL — SIGNIFICANT CHANGE UP (ref 8–20)
C-ANCA SER-ACNC: NEGATIVE — SIGNIFICANT CHANGE UP
CALCIUM SERPL-MCNC: 9.4 MG/DL — SIGNIFICANT CHANGE UP (ref 8.6–10.2)
CHLORIDE SERPL-SCNC: 98 MMOL/L — SIGNIFICANT CHANGE UP (ref 98–107)
CO2 SERPL-SCNC: 36 MMOL/L — HIGH (ref 22–29)
CREAT SERPL-MCNC: 0.68 MG/DL — SIGNIFICANT CHANGE UP (ref 0.5–1.3)
GLUCOSE SERPL-MCNC: 162 MG/DL — HIGH (ref 70–115)
LEGIONELLA AG UR QL: NEGATIVE — SIGNIFICANT CHANGE UP
P-ANCA SER-ACNC: NEGATIVE — SIGNIFICANT CHANGE UP
POTASSIUM SERPL-MCNC: 4.7 MMOL/L — SIGNIFICANT CHANGE UP (ref 3.5–5.3)
POTASSIUM SERPL-SCNC: 4.7 MMOL/L — SIGNIFICANT CHANGE UP (ref 3.5–5.3)
SODIUM SERPL-SCNC: 144 MMOL/L — SIGNIFICANT CHANGE UP (ref 135–145)

## 2019-02-14 PROCEDURE — 99232 SBSQ HOSP IP/OBS MODERATE 35: CPT

## 2019-02-14 PROCEDURE — 99233 SBSQ HOSP IP/OBS HIGH 50: CPT

## 2019-02-14 RX ORDER — SERTRALINE 25 MG/1
12.5 TABLET, FILM COATED ORAL DAILY
Qty: 0 | Refills: 0 | Status: DISCONTINUED | OUTPATIENT
Start: 2019-02-14 | End: 2019-02-16

## 2019-02-14 RX ADMIN — Medication 81 MILLIGRAM(S): at 11:24

## 2019-02-14 RX ADMIN — ATENOLOL 25 MILLIGRAM(S): 25 TABLET ORAL at 05:30

## 2019-02-14 RX ADMIN — Medication 3 MILLILITER(S): at 02:35

## 2019-02-14 RX ADMIN — Medication 40 MILLIGRAM(S): at 05:36

## 2019-02-14 RX ADMIN — Medication 40 MILLIGRAM(S): at 14:46

## 2019-02-14 RX ADMIN — Medication 40 MILLIGRAM(S): at 18:24

## 2019-02-14 RX ADMIN — Medication 20 MILLIGRAM(S): at 11:24

## 2019-02-14 RX ADMIN — ENOXAPARIN SODIUM 40 MILLIGRAM(S): 100 INJECTION SUBCUTANEOUS at 05:31

## 2019-02-14 RX ADMIN — CEFTRIAXONE 100 GRAM(S): 500 INJECTION, POWDER, FOR SOLUTION INTRAMUSCULAR; INTRAVENOUS at 12:39

## 2019-02-14 RX ADMIN — Medication 3 MILLILITER(S): at 21:15

## 2019-02-14 RX ADMIN — Medication 3 MILLILITER(S): at 14:45

## 2019-02-14 RX ADMIN — Medication 250 MILLIGRAM(S): at 17:11

## 2019-02-14 RX ADMIN — SERTRALINE 25 MILLIGRAM(S): 25 TABLET, FILM COATED ORAL at 11:24

## 2019-02-14 RX ADMIN — AZITHROMYCIN 255 MILLIGRAM(S): 500 TABLET, FILM COATED ORAL at 11:24

## 2019-02-14 RX ADMIN — Medication 250 MILLIGRAM(S): at 05:30

## 2019-02-14 RX ADMIN — PANTOPRAZOLE SODIUM 40 MILLIGRAM(S): 20 TABLET, DELAYED RELEASE ORAL at 05:31

## 2019-02-14 NOTE — PROGRESS NOTE ADULT - ASSESSMENT
1. 85 yo F with signif copd admitted with increased dyspnea and PNA.  2. Severe calcific aortic stenosis-explained to pt. At present her pulmonary disease takes precedence  3. Low grade cad.

## 2019-02-14 NOTE — PROGRESS NOTE ADULT - ASSESSMENT
87y/o woman with PMH CAD, HTN, AS, MR, COPD active smoker on Home O2 PRN    1. Cough, sob, subjective fever, CXR findings confirmed by CTA- CAP-   - Rocephin + Zithromax  - ID and Pulm consult appreciated  - DUonebs, O2,   - Blood c/s testing  - RVP negative  - PE ruled out    2. COPD exacerbation acute- sec to above  - in addition to above, Solumedrol  - reduced to Q12 on 2/14    3. Severe AS  - Rpt ECHO noted    4. Elevated BNP  - as above  - likely a component of acute on chronic HFpEF  - on low dose Lasix IV    5. HTN, CAD  - Controlled, stable  - Cont home meds    Lovenox

## 2019-02-14 NOTE — PROGRESS NOTE ADULT - ASSESSMENT
87y/o woman with PMH CAD, HTN, AS, MR, COPD active smoker, started on home o2 PRN about 6 months ago, comes in today with SOB, cough and fever and CXR shows right sided pneumonia.    Pneumonia  PCN allergy    - Blood culture pending   - Sputum culture not done yet  - Urine culture negative  - Urinary legionella Ag negative  - Procalcitonin 0.05  - Chest CT result noted with multilobar opacities   - No reaction to ceftriaxone  - Continue ceftriaxone 1gm daily  - Continue Azithromycin 500mg daily.   - TTE result noted, good EF.   - When ready for discharge, can be switched to po ABx.     Will follow.

## 2019-02-14 NOTE — PROGRESS NOTE ADULT - SUBJECTIVE AND OBJECTIVE BOX
HEALTH ISSUES - PROBLEM Dx:    5y/o woman with PMH CAD, HTN, AS, MR, COPD active smoker, started on home o2 PRN about 6 months ago, comes in today with SOB x 4 days.    CC- PNA, COPD    INTERVAL HPI/ OVERNIGHT EVENTS:    dyspnea much improved, visibly   comfortable  states she feels much better    REVIEW OF SYSTEMS:    fever - cough - sob -    Vital Signs Last 24 Hrs  T(C): 36.8 (2019 07:59), Max: 37 (2019 23:23)  T(F): 98.2 (2019 07:59), Max: 98.6 (2019 23:23)  HR: 76 (2019 14:45) (64 - 88)  BP: 112/59 (2019 11:14) (102/58 - 133/69)  BP(mean): --  RR: 18 (2019 07:59) (18 - 19)  SpO2: 93% (2019 14:45) (90% - 97%)    PHYSICAL EXAM-  GENERAL: Dyspneic, well-groomed, well-developed, frail built  	HEAD:  Atraumatic, Normocephalic  	EYES: EOMI, conjunctiva and sclera clear  	NECK:  No JVD, Normal thyroid  	NERVOUS SYSTEM:  Alert & Oriented X 3, Motor Strength 4/5 B/L upper and lower extremities  	CHEST/LUNG: Scattered rales and rhonchi gutierrez, No wheezing, or rubs  	HEART: Regular rate and rhythm; Systolic murmur +, No rubs, or gallops  	ABDOMEN: Soft, Nontender, Nondistended; Bowel sounds present  	EXTREMITIES:  2+ Peripheral Pulses, No clubbing, cyanosis, or edema  SKIN: No rashes or lesion  LABS:        144  |  98  |  19.0  ----------------------------<  162<H>  4.7   |  36.0<H>  |  0.68    Ca    9.4      2019 13:17        Urinalysis Basic - ( 2019 18:21 )    Color: Yellow / Appearance: Clear / S.010 / pH: x  Gluc: x / Ketone: Negative  / Bili: Negative / Urobili: 1 mg/dL   Blood: x / Protein: 30 mg/dL / Nitrite: Negative   Leuk Esterase: Small / RBC: 3-5 /HPF / WBC 3-5   Sq Epi: x / Non Sq Epi: Occasional / Bacteria: Occasional          Assessment and Plan    Encephalopathy  Malnutrition  Morbid Obesity  Functional quadriplegia    DVT Prophylaxis    Discussed with: Patient, family, RN, CM, Consultants  Plan of care/ Discharge planning discussed.    Visit Time:

## 2019-02-14 NOTE — PROGRESS NOTE ADULT - SUBJECTIVE AND OBJECTIVE BOX
PULMONARY PROGRESS NOTE      LEANNE SHER-[id number]    Patient is a 86y old  Female who presents with a chief complaint of dyspnea (2019 08:49)      INTERVAL HPI/OVERNIGHT EVENTS:    MEDICATIONS  (STANDING):  ALBUTerol    90 MICROgram(s) HFA Inhaler 1 Puff(s) Inhalation every 4 hours  ALBUTerol/ipratropium for Nebulization 3 milliLiter(s) Nebulizer every 6 hours  aspirin enteric coated 81 milliGRAM(s) Oral daily  ATENolol  Tablet 25 milliGRAM(s) Oral daily  azithromycin  IVPB 500 milliGRAM(s) IV Intermittent every 24 hours  cefTRIAXone   IVPB 1 Gram(s) IV Intermittent every 24 hours  enoxaparin Injectable 40 milliGRAM(s) SubCutaneous every 24 hours  furosemide   Injectable 20 milliGRAM(s) IV Push daily  methylPREDNISolone sodium succinate Injectable 40 milliGRAM(s) IV Push every 8 hours  pantoprazole    Tablet 40 milliGRAM(s) Oral before breakfast  saccharomyces boulardii 250 milliGRAM(s) Oral two times a day  sertraline 25 milliGRAM(s) Oral daily  simvastatin 20 milliGRAM(s) Oral at bedtime  tiotropium 18 MICROgram(s) Capsule 1 Capsule(s) Inhalation daily      MEDICATIONS  (PRN):  acetaminophen   Tablet .. 650 milliGRAM(s) Oral every 6 hours PRN Temp greater or equal to 38C (100.4F), Mild Pain (1 - 3)      Allergies    penicillin (Rash)  sulfur hexafluoride (Anaphylaxis; Rash)    Intolerances        PAST MEDICAL & SURGICAL HISTORY:  Liver, polycystic  Pericardial effusion  Mitral valve insufficiency  Aortic stenosis  Aortic valve insufficiency  Smoker unmotivated to quit  Humerus fracture, right, sequela: residual weakness  Left ventricular outflow tract obstruction  SOB (shortness of breath)  BENITEZ (dyspnea on exertion)  Unstable angina  Arthritis: osteoarthritis  Hiatal hernia  Cataract  HTN (hypertension)  HLD (hyperlipidemia)  COPD (chronic obstructive pulmonary disease)  Macular degeneration of both eyes  History of cholecystectomy  History of coronary angiogram: dr saenz  [facility]    History of tonsillectomy  Liver cyst: surgical removal of multiple liver cysts  Anal fistula  Ectopic pregnancy  History of appendectomy  H/O shoulder replacement: right      SOCIAL HISTORY  Smoking History:       REVIEW OF SYSTEMS:    CONSTITUTIONAL:  No distress    HEENT:  Eyes:  No diplopia or blurred vision. ENT:  No earache, sore throat or runny nose.    CARDIOVASCULAR:  No pressure, squeezing, tightness, heaviness or aching about the chest; no palpitations.    RESPIRATORY:  No cough, shortness of breath, PND or orthopnea. Mild SOBOE    GASTROINTESTINAL:  No nausea, vomiting or diarrhea.    GENITOURINARY:  No dysuria, frequency or urgency.    NEUROLOGIC:  No paresthesias, fasciculations, seizures or weakness.    PSYCHIATRIC:  No disorder of thought or mood.    Vital Signs Last 24 Hrs  T(C): 36.8 (2019 07:59), Max: 37 (2019 23:23)  T(F): 98.2 (2019 07:59), Max: 98.6 (2019 23:23)  HR: 66 (2019 11:14) (64 - 88)  BP: 112/59 (2019 11:14) (102/58 - 133/69)  BP(mean): --  RR: 18 (2019 07:59) (18 - 19)  SpO2: 94% (2019 07:59) (90% - 97%)    PHYSICAL EXAMINATION:    GENERAL: The patient is awake and alert in no apparent distress.     HEENT: Head is normocephalic and atraumatic. Extraocular muscles are intact. Mucous membranes are moist.    NECK: Supple.    LUNGS: Clear to auscultation without wheezing, rales or rhonchi; respirations unlabored    HEART: Regular rate and rhythm without murmur.    ABDOMEN: Soft, nontender, and nondistended.      EXTREMITIES: Without any cyanosis, clubbing, rash, lesions or edema.    NEUROLOGIC: Grossly intact.    LABS:                        12.8   6.3   )-----------( 274      ( 2019 13:14 )             40.3     02-12    139  |  97<L>  |  12.0  ----------------------------<  122<H>  3.8   |  32.0<H>  |  0.58    Ca    9.1      2019 13:14  Mg     2.0         TPro  6.8  /  Alb  3.4  /  TBili  0.3<L>  /  DBili  x   /  AST  18  /  ALT  13  /  AlkPhos  60      PT/INR - ( 2019 13:14 )   PT: 13.3 sec;   INR: 1.15 ratio         PTT - ( 2019 13:14 )  PTT:33.3 sec  Urinalysis Basic - ( 2019 18:21 )    Color: Yellow / Appearance: Clear / S.010 / pH: x  Gluc: x / Ketone: Negative  / Bili: Negative / Urobili: 1 mg/dL   Blood: x / Protein: 30 mg/dL / Nitrite: Negative   Leuk Esterase: Small / RBC: 3-5 /HPF / WBC 3-5   Sq Epi: x / Non Sq Epi: Occasional / Bacteria: Occasional        CARDIAC MARKERS ( 2019 13:14 )  x     / <0.01 ng/mL / x     / x     / x            Serum Pro-Brain Natriuretic Peptide: 2883 pg/mL (19 @ 13:14)      Procalcitonin, Serum: 0.05 ng/mL (19 @ 18:22)      MICROBIOLOGY:    RADIOLOGY & ADDITIONAL STUDIES:  cho reviewed PULMONARY PROGRESS NOTE      LEANNE SHER-[id number]    Patient is a 86y old  Female who presents with a chief complaint of dyspnea (2019 08:49)      INTERVAL HPI/OVERNIGHT EVENTS:  remains dyspneic, slightly better  no fever, chill, chest pain  MEDICATIONS  (STANDING):  ALBUTerol    90 MICROgram(s) HFA Inhaler 1 Puff(s) Inhalation every 4 hours  ALBUTerol/ipratropium for Nebulization 3 milliLiter(s) Nebulizer every 6 hours  aspirin enteric coated 81 milliGRAM(s) Oral daily  ATENolol  Tablet 25 milliGRAM(s) Oral daily  azithromycin  IVPB 500 milliGRAM(s) IV Intermittent every 24 hours  cefTRIAXone   IVPB 1 Gram(s) IV Intermittent every 24 hours  enoxaparin Injectable 40 milliGRAM(s) SubCutaneous every 24 hours  furosemide   Injectable 20 milliGRAM(s) IV Push daily  methylPREDNISolone sodium succinate Injectable 40 milliGRAM(s) IV Push every 8 hours  pantoprazole    Tablet 40 milliGRAM(s) Oral before breakfast  saccharomyces boulardii 250 milliGRAM(s) Oral two times a day  sertraline 25 milliGRAM(s) Oral daily  simvastatin 20 milliGRAM(s) Oral at bedtime  tiotropium 18 MICROgram(s) Capsule 1 Capsule(s) Inhalation daily      MEDICATIONS  (PRN):  acetaminophen   Tablet .. 650 milliGRAM(s) Oral every 6 hours PRN Temp greater or equal to 38C (100.4F), Mild Pain (1 - 3)      Allergies    penicillin (Rash)  sulfur hexafluoride (Anaphylaxis; Rash)    Intolerances        PAST MEDICAL & SURGICAL HISTORY:  Liver, polycystic  Pericardial effusion  Mitral valve insufficiency  Aortic stenosis  Aortic valve insufficiency  Smoker unmotivated to quit  Humerus fracture, right, sequela: residual weakness  Left ventricular outflow tract obstruction  SOB (shortness of breath)  BENITEZ (dyspnea on exertion)  Unstable angina  Arthritis: osteoarthritis  Hiatal hernia  Cataract  HTN (hypertension)  HLD (hyperlipidemia)  COPD (chronic obstructive pulmonary disease)  Macular degeneration of both eyes  History of cholecystectomy  History of coronary angiogram: dr saenz  [facility]    History of tonsillectomy  Liver cyst: surgical removal of multiple liver cysts  Anal fistula  Ectopic pregnancy  History of appendectomy  H/O shoulder replacement: right      SOCIAL HISTORY  Smoking History:       REVIEW OF SYSTEMS:    CONSTITUTIONAL:  No distress    HEENT:  Eyes:  No diplopia or blurred vision. ENT:  No earache, sore throat or runny nose.    CARDIOVASCULAR:  No pressure, squeezing, tightness, heaviness or aching about the chest; no palpitations.    RESPIRATORY:  see HPI    GASTROINTESTINAL:  No nausea, vomiting or diarrhea.    GENITOURINARY:  No dysuria, frequency or urgency.    NEUROLOGIC:  No paresthesias, fasciculations, seizures or weakness.    PSYCHIATRIC:  No disorder of thought or mood.    Vital Signs Last 24 Hrs  T(C): 36.8 (2019 07:59), Max: 37 (2019 23:23)  T(F): 98.2 (2019 07:59), Max: 98.6 (2019 23:23)  HR: 66 (2019 11:14) (64 - 88)  BP: 112/59 (2019 11:14) (102/58 - 133/69)  BP(mean): --  RR: 18 (2019 07:59) (18 - 19)  SpO2: 94% (2019 07:59) (90% - 97%)    PHYSICAL EXAMINATION:    GENERAL: The patient is awake and alert in no apparent distress, remains mildly dyspneic at rest, no paradox    HEENT: Head is normocephalic and atraumatic. Extraocular muscles are intact. Mucous membranes are moist.    NECK: Supple.    LUNGS: moderate air entry bilat rare crackle R bases without wheezing, respirations unlabored    HEART: Regular rate and rhythm without murmur.    ABDOMEN: Soft, nontender, and nondistended.      EXTREMITIES: Without any cyanosis, clubbing, rash, lesions or edema.    NEUROLOGIC: Grossly intact.    LABS:                        12.8   6.3   )-----------( 274      ( 2019 13:14 )             40.3     02-12    139  |  97<L>  |  12.0  ----------------------------<  122<H>  3.8   |  32.0<H>  |  0.58    Ca    9.1      2019 13:14  Mg     2.0     02-12    TPro  6.8  /  Alb  3.4  /  TBili  0.3<L>  /  DBili  x   /  AST  18  /  ALT  13  /  AlkPhos  60  02-12    PT/INR - ( 2019 13:14 )   PT: 13.3 sec;   INR: 1.15 ratio         PTT - ( 2019 13:14 )  PTT:33.3 sec  Urinalysis Basic - ( 2019 18:21 )    Color: Yellow / Appearance: Clear / S.010 / pH: x  Gluc: x / Ketone: Negative  / Bili: Negative / Urobili: 1 mg/dL   Blood: x / Protein: 30 mg/dL / Nitrite: Negative   Leuk Esterase: Small / RBC: 3-5 /HPF / WBC 3-5   Sq Epi: x / Non Sq Epi: Occasional / Bacteria: Occasional        CARDIAC MARKERS ( 2019 13:14 )  x     / <0.01 ng/mL / x     / x     / x            Serum Pro-Brain Natriuretic Peptide: 2883 pg/mL (19 @ 13:14)      Procalcitonin, Serum: 0.05 ng/mL (19 @ 18:22)      MICROBIOLOGY:    RADIOLOGY & ADDITIONAL STUDIES:  echo reviewed

## 2019-02-14 NOTE — PROGRESS NOTE ADULT - ASSESSMENT
Severe COPD, home 02, worsening dyspnea  bronchial wall thickening and mucus plugging, severe emphysema  new infiltrates , R predominant with septal thickening, bilat effusions  favor pna plus atypical CHF component, severe AS by echocardiogram  subcarinal fullness noted ,suspect reactive adenopathy, will follow  not bronchospastic currently, BNP, procal noted  continue abx per ID, wean medrol, continue nebs, 02  lasix added, discussed eventual TAVR with Cardiology and pt  prognosis guarded, given age co morbidity

## 2019-02-14 NOTE — PROGRESS NOTE ADULT - SUBJECTIVE AND OBJECTIVE BOX
[facility]  INFECTIOUS DISEASES AND INTERNAL MEDICINE at [place]  =======================================================  Akbar Gonzalez MD  Diplomates American Board of Internal Medicine and Infectious Diseases  =======================================================    [id number]  EL SHER     Follow up: Pneumonia    No new event or complaint. No fever or chest pain.   Has some cough. Feels discomfort and dryness in her throat.     PAST MEDICAL & SURGICAL HISTORY:  Liver, polycystic  Pericardial effusion  Mitral valve insufficiency  Aortic stenosis  Aortic valve insufficiency  Smoker unmotivated to quit  Humerus fracture, right, sequela: residual weakness  Left ventricular outflow tract obstruction  SOB (shortness of breath)  BENITEZ (dyspnea on exertion)  Unstable angina  Arthritis: osteoarthritis  Hiatal hernia  Cataract  HTN (hypertension)  HLD (hyperlipidemia)  COPD (chronic obstructive pulmonary disease)  Macular degeneration of both eyes  History of cholecystectomy  History of coronary angiogram: dr saenz  [facility]  2016  History of tonsillectomy  Liver cyst: surgical removal of multiple liver cysts  Anal fistula  Ectopic pregnancy  History of appendectomy  H/O shoulder replacement: right    Social Hx: smokes but lately trying to cut down, no other toxic habits.     FAMILY HISTORY:  Family history of MI (myocardial infarction) (Father)    Antibiotics:   Ceftriaxone  Azithromycin     Allergies  penicillin (Rash)  sulfur hexafluoride (Anaphylaxis; Rash)    REVIEW OF SYSTEMS:  CONSTITUTIONAL:  No Fever or chills  HEENT:  No diplopia or blurred vision.  No sore throat or runny nose.  CARDIOVASCULAR:  No chest pain, +SOB.  RESPIRATORY:  + cough, + shortness of breath  GASTROINTESTINAL:  No nausea, vomiting or diarrhea.  GENITOURINARY:  No dysuria, frequency or urgency. No Blood in urine  MUSCULOSKELETAL:  no joint aches, no muscle pain  SKIN:  No change in skin, hair or nails.  NEUROLOGIC:  No paresthesias, fasciculations, seizures or weakness.  PSYCHIATRIC:  No disorder of thought or mood.  ENDOCRINE:  No heat or cold intolerance, polyuria or polydipsia.  HEMATOLOGICAL:  No easy bruising or bleeding.     Physical Exam:  Vital Signs Last 24 Hrs  T(C): 36.8 (2019 07:59), Max: 37 (2019 23:23)  T(F): 98.2 (2019 07:59), Max: 98.6 (2019 23:23)  HR: 69 (2019 07:59) (64 - 88)  BP: 114/60 (2019 07:59) (102/58 - 133/69)  BP(mean): --  RR: 18 (2019 07:59) (18 - 19)  SpO2: 94% (2019 07:59) (90% - 97%)  GEN: NAD, elderly and frail   HEENT: normocephalic and atraumatic. EOMI. PERRL.    NECK: Supple.  No lymphadenopathy   LUNGS: Bilateral crackles, R>L  HEART: Regular rate and rhythm 2/6 systolic murmur.  ABDOMEN: Soft, nontender, and nondistended.  Positive bowel sounds.    : No CVA tenderness  EXTREMITIES: trace edema.  NEUROLOGIC: grossly intact.  PSYCHIATRIC: Appropriate affect .  SKIN: No ulceration or induration present.    Labs:      139  |  97<L>  |  12.0  ----------------------------<  122<H>  3.8   |  32.0<H>  |  0.58    Ca    9.1      2019 13:14  Mg     2.0         TPro  6.8  /  Alb  3.4  /  TBili  0.3<L>  /  DBili  x   /  AST  18  /  ALT  13  /  AlkPhos  60                          12.8   6.3   )-----------( 274      ( 2019 13:14 )             40.3     PT/INR - ( 2019 13:14 )   PT: 13.3 sec;   INR: 1.15 ratio     PTT - ( 2019 13:14 )  PTT:33.3 sec  Urinalysis Basic - ( 2019 18:21 )    Color: Yellow / Appearance: Clear / S.010 / pH: x  Gluc: x / Ketone: Negative  / Bili: Negative / Urobili: 1 mg/dL   Blood: x / Protein: 30 mg/dL / Nitrite: Negative   Leuk Esterase: Small / RBC: 3-5 /HPF / WBC 3-5   Sq Epi: x / Non Sq Epi: Occasional / Bacteria: Occasional    LIVER FUNCTIONS - ( 2019 13:14 )  Alb: 3.4 g/dL / Pro: 6.8 g/dL / ALK PHOS: 60 U/L / ALT: 13 U/L / AST: 18 U/L / GGT: x           CARDIAC MARKERS ( 2019 13:14 )  x     / <0.01 ng/mL / x     / x     / x        RECENT CULTURES:   @ 18:20 .Urine     No growth     @ 13:13      NotDetec      All imaging and other data have been reviewed.

## 2019-02-14 NOTE — PROGRESS NOTE ADULT - SUBJECTIVE AND OBJECTIVE BOX
Chief Complaint:  recent course and chart reviewed.    HPI: 85 yo F admitted with dyspnea. Known advanced copd and being rx'd for PNA. Recent cath low grade cad. Current echo-nl EF with severe AS.    PAST MEDICAL & SURGICAL HISTORY:  Liver, polycystic  Pericardial effusion  Mitral valve insufficiency  Aortic stenosis  Aortic valve insufficiency  Smoker unmotivated to quit  Humerus fracture, right, sequela: residual weakness  Left ventricular outflow tract obstruction  SOB (shortness of breath)  BENITEZ (dyspnea on exertion)  Unstable angina  Arthritis: osteoarthritis  Hiatal hernia  Cataract  HTN (hypertension)  HLD (hyperlipidemia)  COPD (chronic obstructive pulmonary disease)  Macular degeneration of both eyes  History of cholecystectomy  History of coronary angiogram: dr saenz  [facility]    History of tonsillectomy  Liver cyst: surgical removal of multiple liver cysts  Anal fistula  Ectopic pregnancy  History of appendectomy  H/O shoulder replacement: right      PREVIOUS DIAGNOSTIC TESTING:      ECHO  FINDINGS: see above    STRESS  FINDINGS:    CATHETERIZATION  FINDINGS: see above    MEDICATIONS  (STANDING):  ALBUTerol    90 MICROgram(s) HFA Inhaler 1 Puff(s) Inhalation every 4 hours  ALBUTerol/ipratropium for Nebulization 3 milliLiter(s) Nebulizer every 6 hours  aspirin enteric coated 81 milliGRAM(s) Oral daily  ATENolol  Tablet 25 milliGRAM(s) Oral daily  azithromycin  IVPB 500 milliGRAM(s) IV Intermittent every 24 hours  cefTRIAXone   IVPB 1 Gram(s) IV Intermittent every 24 hours  enoxaparin Injectable 40 milliGRAM(s) SubCutaneous every 24 hours  furosemide   Injectable 20 milliGRAM(s) IV Push daily  methylPREDNISolone sodium succinate Injectable 40 milliGRAM(s) IV Push every 8 hours  pantoprazole    Tablet 40 milliGRAM(s) Oral before breakfast  saccharomyces boulardii 250 milliGRAM(s) Oral two times a day  sertraline 25 milliGRAM(s) Oral daily  simvastatin 20 milliGRAM(s) Oral at bedtime  tiotropium 18 MICROgram(s) Capsule 1 Capsule(s) Inhalation daily    MEDICATIONS  (PRN):  acetaminophen   Tablet .. 650 milliGRAM(s) Oral every 6 hours PRN Temp greater or equal to 38C (100.4F), Mild Pain (1 - 3)      FAMILY HISTORY:  Family history of MI (myocardial infarction)      ROS: Negative other than as mentioned in HPI.    Vital Signs Last 24 Hrs  T(C): 36.8 (2019 07:59), Max: 37 (2019 23:23)  T(F): 98.2 (2019 07:59), Max: 98.6 (2019 23:23)  HR: 65 reg (2019 11:14) (64 - 88)  BP: 100/70 la manually (2019 11:14) (102/58 - 133/69)  BP(mean): --  RR: 18 on nasal 02 (2019 07:59) (18 - 19)  SpO2: 94% (2019 07:59) (90% - 97%)    PHYSICAL EXAM:  General: Appears well developed, well nourished alert and cooperative. afebrile nad   HEENT: Head; normocephalic, atraumatic.  Eyes;   Pupils reactive, cornea wnl.  Neck; Supple, no nodes adenopathy, no NVD or carotid bruit or thyromegaly.  CARDIOVASCULAR; 2/6 basal systolic murmurwith diminished S2. No rub, gallop or lift. Normal S1   LUNGS; No rales, rhonchi or wheeze. Normal breath sounds bilaterally.  ABDOMEN ; Soft, nontender without mass or organomegaly. bowel sounds normoactive.  EXTREMITIES; No clubbing, cyanosis or edema.   SKIN; warm and dry with normal turgor.  NEURO; Alert/oriented x 3/normal motor exam.    PSYCH; normal affect.            INTERPRETATION OF TELEMETRY:    ECG: SR inferolat T inversions    I&O's Detail      LABS:                        12.8   6.3   )-----------( 274      ( 2019 13:14 )             40.3     02-12    139  |  97<L>  |  12.0  ----------------------------<  122<H>  3.8   |  32.0<H>  |  0.58    Ca    9.1      2019 13:14  Mg     2.0     02-12    TPro  6.8  /  Alb  3.4  /  TBili  0.3<L>  /  DBili  x   /  AST  18  /  ALT  13  /  AlkPhos  60  02-12    CARDIAC MARKERS ( 2019 13:14 )  x     / <0.01 ng/mL / x     / x     / x          PT/INR - ( 2019 13:14 )   PT: 13.3 sec;   INR: 1.15 ratio         PTT - ( 2019 13:14 )  PTT:33.3 sec  Urinalysis Basic - ( 2019 18:21 )    Color: Yellow / Appearance: Clear / S.010 / pH: x  Gluc: x / Ketone: Negative  / Bili: Negative / Urobili: 1 mg/dL   Blood: x / Protein: 30 mg/dL / Nitrite: Negative   Leuk Esterase: Small / RBC: 3-5 /HPF / WBC 3-5   Sq Epi: x / Non Sq Epi: Occasional / Bacteria: Occasional      I&O's Summary      RADIOLOGY & ADDITIONAL STUDIES:

## 2019-02-15 LAB
ANION GAP SERPL CALC-SCNC: 9 MMOL/L — SIGNIFICANT CHANGE UP (ref 5–17)
AUTO DIFF PNL BLD: ABNORMAL
BUN SERPL-MCNC: 21 MG/DL — HIGH (ref 8–20)
CALCIUM SERPL-MCNC: 9.2 MG/DL — SIGNIFICANT CHANGE UP (ref 8.6–10.2)
CHLORIDE SERPL-SCNC: 101 MMOL/L — SIGNIFICANT CHANGE UP (ref 98–107)
CO2 SERPL-SCNC: 40 MMOL/L — HIGH (ref 22–29)
CREAT SERPL-MCNC: 0.55 MG/DL — SIGNIFICANT CHANGE UP (ref 0.5–1.3)
GLUCOSE SERPL-MCNC: 106 MG/DL — SIGNIFICANT CHANGE UP (ref 70–115)
HCT VFR BLD CALC: 41.9 % — SIGNIFICANT CHANGE UP (ref 37–47)
HGB BLD-MCNC: 12.7 G/DL — SIGNIFICANT CHANGE UP (ref 12–16)
MAGNESIUM SERPL-MCNC: 2.3 MG/DL — SIGNIFICANT CHANGE UP (ref 1.6–2.6)
MCHC RBC-ENTMCNC: 29.1 PG — SIGNIFICANT CHANGE UP (ref 27–31)
MCHC RBC-ENTMCNC: 30.3 G/DL — LOW (ref 32–36)
MCV RBC AUTO: 96.1 FL — SIGNIFICANT CHANGE UP (ref 81–99)
PLATELET # BLD AUTO: 371 K/UL — SIGNIFICANT CHANGE UP (ref 150–400)
POTASSIUM SERPL-MCNC: 4.6 MMOL/L — SIGNIFICANT CHANGE UP (ref 3.5–5.3)
POTASSIUM SERPL-SCNC: 4.6 MMOL/L — SIGNIFICANT CHANGE UP (ref 3.5–5.3)
RBC # BLD: 4.36 M/UL — LOW (ref 4.4–5.2)
RBC # FLD: 13.6 % — SIGNIFICANT CHANGE UP (ref 11–15.6)
SODIUM SERPL-SCNC: 150 MMOL/L — HIGH (ref 135–145)
WBC # BLD: 14.7 K/UL — HIGH (ref 4.8–10.8)
WBC # FLD AUTO: 14.7 K/UL — HIGH (ref 4.8–10.8)

## 2019-02-15 PROCEDURE — 99233 SBSQ HOSP IP/OBS HIGH 50: CPT

## 2019-02-15 PROCEDURE — 99232 SBSQ HOSP IP/OBS MODERATE 35: CPT

## 2019-02-15 RX ORDER — AZITHROMYCIN 500 MG/1
250 TABLET, FILM COATED ORAL DAILY
Qty: 0 | Refills: 0 | Status: COMPLETED | OUTPATIENT
Start: 2019-02-15 | End: 2019-02-16

## 2019-02-15 RX ORDER — SERTRALINE 25 MG/1
1 TABLET, FILM COATED ORAL
Qty: 0 | Refills: 0 | COMMUNITY

## 2019-02-15 RX ORDER — FUROSEMIDE 40 MG
20 TABLET ORAL DAILY
Qty: 0 | Refills: 0 | Status: DISCONTINUED | OUTPATIENT
Start: 2019-02-15 | End: 2019-02-18

## 2019-02-15 RX ADMIN — AZITHROMYCIN 250 MILLIGRAM(S): 500 TABLET, FILM COATED ORAL at 12:24

## 2019-02-15 RX ADMIN — ATENOLOL 25 MILLIGRAM(S): 25 TABLET ORAL at 05:34

## 2019-02-15 RX ADMIN — Medication 250 MILLIGRAM(S): at 18:43

## 2019-02-15 RX ADMIN — Medication 40 MILLIGRAM(S): at 23:54

## 2019-02-15 RX ADMIN — Medication 3 MILLILITER(S): at 15:45

## 2019-02-15 RX ADMIN — ENOXAPARIN SODIUM 40 MILLIGRAM(S): 100 INJECTION SUBCUTANEOUS at 05:36

## 2019-02-15 RX ADMIN — SERTRALINE 12.5 MILLIGRAM(S): 25 TABLET, FILM COATED ORAL at 12:23

## 2019-02-15 RX ADMIN — Medication 3 MILLILITER(S): at 22:02

## 2019-02-15 RX ADMIN — Medication 81 MILLIGRAM(S): at 12:23

## 2019-02-15 RX ADMIN — Medication 20 MILLIGRAM(S): at 05:35

## 2019-02-15 RX ADMIN — CEFTRIAXONE 100 GRAM(S): 500 INJECTION, POWDER, FOR SOLUTION INTRAMUSCULAR; INTRAVENOUS at 12:23

## 2019-02-15 RX ADMIN — Medication 40 MILLIGRAM(S): at 12:23

## 2019-02-15 RX ADMIN — Medication 3 MILLILITER(S): at 08:52

## 2019-02-15 RX ADMIN — Medication 40 MILLIGRAM(S): at 05:35

## 2019-02-15 RX ADMIN — Medication 250 MILLIGRAM(S): at 05:34

## 2019-02-15 RX ADMIN — Medication 40 MILLIGRAM(S): at 18:43

## 2019-02-15 RX ADMIN — PANTOPRAZOLE SODIUM 40 MILLIGRAM(S): 20 TABLET, DELAYED RELEASE ORAL at 05:34

## 2019-02-15 RX ADMIN — SIMVASTATIN 20 MILLIGRAM(S): 20 TABLET, FILM COATED ORAL at 22:24

## 2019-02-15 NOTE — PHARMACOTHERAPY INTERVENTION NOTE - COMMENTS
Spoke to patient regarding PCN allergy. Reports PCN allergy 60 years ago as a rash. Has not received PCN since. Verified with pharmacy to determine if PCN has been dispensed in the past - none on file. Due to pt's advanced age, recommended starting ceftriaxone for PCN in light of low likelihood of PCN allergy and monitoring for s/s of allergic rxn.
Azithromycin IV changed to PO 250mg daily to complete 5 days thru 2/16/19 (started on 2/12/19).  Ceftriaxone IV 1g daily to complete 5 days thru 2/16/19

## 2019-02-15 NOTE — PROGRESS NOTE ADULT - SUBJECTIVE AND OBJECTIVE BOX
[place] [facility]                                   [address] [place] [address]                                                      PHONE: [phone]                                                         FAX: [phone]  [email or web address]  ---------------------------------------------------------------------------------------------------------------------------------    Overnight events/patient complaints:    no events       PAST MEDICAL & SURGICAL HISTORY:  Liver, polycystic  Pericardial effusion  Mitral valve insufficiency  Aortic stenosis  Aortic valve insufficiency  Smoker unmotivated to quit  Humerus fracture, right, sequela: residual weakness  Left ventricular outflow tract obstruction  SOB (shortness of breath)  BENITEZ (dyspnea on exertion)  Unstable angina  Arthritis: osteoarthritis  Hiatal hernia  Cataract  HTN (hypertension)  HLD (hyperlipidemia)  COPD (chronic obstructive pulmonary disease)  Macular degeneration of both eyes  History of cholecystectomy  History of coronary angiogram: dr saenz  [facility]  2016  History of tonsillectomy  Liver cyst: surgical removal of multiple liver cysts  Anal fistula  Ectopic pregnancy  History of appendectomy  H/O shoulder replacement: right      penicillin (Rash)  sulfur hexafluoride (Anaphylaxis; Rash)    MEDICATIONS  (STANDING):  ALBUTerol    90 MICROgram(s) HFA Inhaler 1 Puff(s) Inhalation every 4 hours  ALBUTerol/ipratropium for Nebulization 3 milliLiter(s) Nebulizer every 6 hours  aspirin enteric coated 81 milliGRAM(s) Oral daily  ATENolol  Tablet 25 milliGRAM(s) Oral daily  azithromycin   Tablet 250 milliGRAM(s) Oral daily  cefTRIAXone   IVPB 1 Gram(s) IV Intermittent every 24 hours  enoxaparin Injectable 40 milliGRAM(s) SubCutaneous every 24 hours  methylPREDNISolone sodium succinate Injectable 40 milliGRAM(s) IV Push every 6 hours  pantoprazole    Tablet 40 milliGRAM(s) Oral before breakfast  saccharomyces boulardii 250 milliGRAM(s) Oral two times a day  sertraline 12.5 milliGRAM(s) Oral daily  simvastatin 20 milliGRAM(s) Oral at bedtime  tiotropium 18 MICROgram(s) Capsule 1 Capsule(s) Inhalation daily    MEDICATIONS  (PRN):  acetaminophen   Tablet .. 650 milliGRAM(s) Oral every 6 hours PRN Temp greater or equal to 38C (100.4F), Mild Pain (1 - 3)      Vital Signs Last 24 Hrs  T(C): 36.7 (15 Feb 2019 07:28), Max: 37.1 (15 Feb 2019 00:03)  T(F): 98 (15 Feb 2019 07:28), Max: 98.7 (15 Feb 2019 00:03)  HR: 71 (15 Feb 2019 07:28) (68 - 80)  BP: 157/67 (15 Feb 2019 07:28) (118/61 - 157/67)  BP(mean): --  RR: 20 (15 Feb 2019 07:28) (18 - 20)  SpO2: 100% (15 Feb 2019 07:28) (93% - 100%)  ICU Vital Signs Last 24 Hrs  EL NIKKY  I&O's Detail    I&O's Summary    Drug Dosing Weight  EL SHER      PHYSICAL EXAM:  General: Appears well developed, well nourished alert and cooperative.  HEENT: Head; normocephalic, atraumatic.  Eyes: Pupils reactive, cornea wnl.  Neck: Supple, no nodes adenopathy, no NVD or carotid bruit or thyromegaly.  CARDIOVASCULAR: + murmur   LUNGS: No rales, rhonchi or wheeze. Normal breath sounds bilaterally.  ABDOMEN: Soft, nontender without mass or organomegaly. bowel sounds normoactive.  EXTREMITIES: No clubbing, cyanosis or edema. Distal pulses wnl.   SKIN: warm and dry with normal turgor.  NEURO: Alert/oriented x 3/normal motor exam. No pathologic reflexes.    PSYCH: normal affect.        LABS:                        12.7   14.7  )-----------( 371      ( 15 Feb 2019 06:45 )             41.9     02-15    150<H>  |  101  |  21.0<H>  ----------------------------<  106  4.6   |  40.0<H>  |  0.55    Ca    9.2      15 Feb 2019 06:45  Mg     2.3     02-15      EL SHER            RADIOLOGY & ADDITIONAL STUDIES:    INTERPRETATION OF TELEMETRY (personally reviewed):       ECHO:       Summary:   1. Normal global left ventricular systolic function.   2. Spectral Doppler shows impaired relaxation pattern of left   ventricular myocardial filling (Grade I diastolic dysfunction).   3. There is moderate concentric left ventricular hypertrophy.   4. Degenerative mitral valve.   5. Thickening and calcification of the anterior and posterior mitral   valve leaflets.   6. Mild-moderate tricuspid regurgitation.   7. Pulmonic valve regurgitation.   8. Estimated pulmonary artery systolic pressure is 50.9 mmHg assuming a   right atrial pressure of 3 mmHg, which is consistent with moderate   pulmonary hypertension.   9. Peak transaortic gradient is 41.3 mmHg, mean transaortic gradient   equals 21.3 mmHg, the calculated aortic valve area equals 0.95 cm² by the   continuity equation consistent with severe aortic stenosis.  10. There is moderate aortic root calcification.  11. Incidental findings of right kidney and liver cysts.  12. Left ventricular ejection fraction, by visual estimation, is 70 to   75%.  13. Normal left ventricular internal cavity size.  14. Moderately increased LV wall thickness.      ASSESSMENT AND PLAN:  In summary, EL SHER is an 86y Female with past medical history significant for active smoker,  copd, AS - severe presenting with sob.  pt admitted with pna.     -continue tx for pna.     Thank you for allowing [facility] to participate in the care of this patient.  Please feel free to call with any questions or concerns. [place] [facility], [facility]                                   [address] [address]                                                      PHONE: [phone]                                                         FAX: [phone]  [email or web address]  ---------------------------------------------------------------------------------------------------------------------------------    Overnight events/patient complaints:    no events   breathing mildly improved  pt still c/o being sob       PAST MEDICAL & SURGICAL HISTORY:  Liver, polycystic  Pericardial effusion  Mitral valve insufficiency  Aortic stenosis  Aortic valve insufficiency  Smoker unmotivated to quit  Humerus fracture, right, sequela: residual weakness  Left ventricular outflow tract obstruction  SOB (shortness of breath)  BENITEZ (dyspnea on exertion)  Unstable angina  Arthritis: osteoarthritis  Hiatal hernia  Cataract  HTN (hypertension)  HLD (hyperlipidemia)  COPD (chronic obstructive pulmonary disease)  Macular degeneration of both eyes  History of cholecystectomy  History of coronary angiogram: dr saenz  [facility]  2016  History of tonsillectomy  Liver cyst: surgical removal of multiple liver cysts  Anal fistula  Ectopic pregnancy  History of appendectomy  H/O shoulder replacement: right      penicillin (Rash)  sulfur hexafluoride (Anaphylaxis; Rash)    MEDICATIONS  (STANDING):  ALBUTerol    90 MICROgram(s) HFA Inhaler 1 Puff(s) Inhalation every 4 hours  ALBUTerol/ipratropium for Nebulization 3 milliLiter(s) Nebulizer every 6 hours  aspirin enteric coated 81 milliGRAM(s) Oral daily  ATENolol  Tablet 25 milliGRAM(s) Oral daily  azithromycin   Tablet 250 milliGRAM(s) Oral daily  cefTRIAXone   IVPB 1 Gram(s) IV Intermittent every 24 hours  enoxaparin Injectable 40 milliGRAM(s) SubCutaneous every 24 hours  methylPREDNISolone sodium succinate Injectable 40 milliGRAM(s) IV Push every 6 hours  pantoprazole    Tablet 40 milliGRAM(s) Oral before breakfast  saccharomyces boulardii 250 milliGRAM(s) Oral two times a day  sertraline 12.5 milliGRAM(s) Oral daily  simvastatin 20 milliGRAM(s) Oral at bedtime  tiotropium 18 MICROgram(s) Capsule 1 Capsule(s) Inhalation daily    MEDICATIONS  (PRN):  acetaminophen   Tablet .. 650 milliGRAM(s) Oral every 6 hours PRN Temp greater or equal to 38C (100.4F), Mild Pain (1 - 3)      Vital Signs Last 24 Hrs  T(C): 36.7 (15 Feb 2019 07:28), Max: 37.1 (15 Feb 2019 00:03)  T(F): 98 (15 Feb 2019 07:28), Max: 98.7 (15 Feb 2019 00:03)  HR: 71 (15 Feb 2019 07:28) (68 - 80)  BP: 157/67 (15 Feb 2019 07:28) (118/61 - 157/67)  BP(mean): --  RR: 20 (15 Feb 2019 07:28) (18 - 20)  SpO2: 100% (15 Feb 2019 07:28) (93% - 100%)  ICU Vital Signs Last 24 Hrs  EL SHRE  I&O's Detail    I&O's Summary    Drug Dosing Weight  EL SHER      PHYSICAL EXAM:  General: Appears well developed, well nourished alert and cooperative.  HEENT: Head; normocephalic, atraumatic.  Eyes: Pupils reactive, cornea wnl.  Neck: Supple, no nodes adenopathy, no NVD or carotid bruit or thyromegaly.  CARDIOVASCULAR: + murmur   LUNGS: No rales, rhonchi or wheeze. Normal breath sounds bilaterally.  ABDOMEN: Soft, nontender without mass or organomegaly. bowel sounds normoactive.  EXTREMITIES: No clubbing, cyanosis or edema. Distal pulses wnl.   SKIN: warm and dry with normal turgor.  NEURO: Alert/oriented x 3/normal motor exam. No pathologic reflexes.    PSYCH: normal affect.        LABS:                        12.7   14.7  )-----------( 371      ( 15 Feb 2019 06:45 )             41.9     02-15    150<H>  |  101  |  21.0<H>  ----------------------------<  106  4.6   |  40.0<H>  |  0.55    Ca    9.2      15 Feb 2019 06:45  Mg     2.3     02-15      EL SHER            RADIOLOGY & ADDITIONAL STUDIES:    INTERPRETATION OF TELEMETRY (personally reviewed):       ECHO:       Summary:   1. Normal global left ventricular systolic function.   2. Spectral Doppler shows impaired relaxation pattern of left   ventricular myocardial filling (Grade I diastolic dysfunction).   3. There is moderate concentric left ventricular hypertrophy.   4. Degenerative mitral valve.   5. Thickening and calcification of the anterior and posterior mitral   valve leaflets.   6. Mild-moderate tricuspid regurgitation.   7. Pulmonic valve regurgitation.   8. Estimated pulmonary artery systolic pressure is 50.9 mmHg assuming a   right atrial pressure of 3 mmHg, which is consistent with moderate   pulmonary hypertension.   9. Peak transaortic gradient is 41.3 mmHg, mean transaortic gradient   equals 21.3 mmHg, the calculated aortic valve area equals 0.95 cm² by the   continuity equation consistent with severe aortic stenosis.  10. There is moderate aortic root calcification.  11. Incidental findings of right kidney and liver cysts.  12. Left ventricular ejection fraction, by visual estimation, is 70 to   75%.  13. Normal left ventricular internal cavity size.  14. Moderately increased LV wall thickness.      ASSESSMENT AND PLAN:  In summary, EL SHER is an 86y Female with past medical history significant for active smoker,  copd, AS - severe presenting with sob.  pt admitted with pna.     SOB:  -continue tx for pna/copd.   -Severe AS- will arrange outpt eval for TAVR- pt not a candidate at this time given active infection  -continue remainder of cardiac meds/dosages     Dysphagia  -pt needs GI eval given recurrent dyshagia and regurgitation of food.     Thank you for allowing [facility] to participate in the care of this patient.  Please feel free to call with any questions or concerns.

## 2019-02-15 NOTE — PROGRESS NOTE ADULT - SUBJECTIVE AND OBJECTIVE BOX
PULMONARY PROGRESS NOTE      EL SHER  [id number]    Patient is a 86y old  Female who presents with a chief complaint of copd (14 Feb 2019 11:54)      INTERVAL HPI/OVERNIGHT EVENTS:    Patient is awake and alert  Resting comfortably  C/o SOBOE    MEDICATIONS  (STANDING):  ALBUTerol    90 MICROgram(s) HFA Inhaler 1 Puff(s) Inhalation every 4 hours  ALBUTerol/ipratropium for Nebulization 3 milliLiter(s) Nebulizer every 6 hours  aspirin enteric coated 81 milliGRAM(s) Oral daily  ATENolol  Tablet 25 milliGRAM(s) Oral daily  azithromycin   Tablet 250 milliGRAM(s) Oral daily  cefTRIAXone   IVPB 1 Gram(s) IV Intermittent every 24 hours  enoxaparin Injectable 40 milliGRAM(s) SubCutaneous every 24 hours  methylPREDNISolone sodium succinate Injectable 40 milliGRAM(s) IV Push every 6 hours  pantoprazole    Tablet 40 milliGRAM(s) Oral before breakfast  saccharomyces boulardii 250 milliGRAM(s) Oral two times a day  sertraline 12.5 milliGRAM(s) Oral daily  simvastatin 20 milliGRAM(s) Oral at bedtime  tiotropium 18 MICROgram(s) Capsule 1 Capsule(s) Inhalation daily      MEDICATIONS  (PRN):  acetaminophen   Tablet .. 650 milliGRAM(s) Oral every 6 hours PRN Temp greater or equal to 38C (100.4F), Mild Pain (1 - 3)      Allergies    penicillin (Rash)  sulfur hexafluoride (Anaphylaxis; Rash)    Intolerances        PAST MEDICAL & SURGICAL HISTORY:  Liver, polycystic  Pericardial effusion  Mitral valve insufficiency  Aortic stenosis  Aortic valve insufficiency  Smoker unmotivated to quit  Humerus fracture, right, sequela: residual weakness  Left ventricular outflow tract obstruction  SOB (shortness of breath)  BENITEZ (dyspnea on exertion)  Unstable angina  Arthritis: osteoarthritis  Hiatal hernia  Cataract  HTN (hypertension)  HLD (hyperlipidemia)  COPD (chronic obstructive pulmonary disease)  Macular degeneration of both eyes  History of cholecystectomy  History of coronary angiogram: dr saenz  [facility]  2016  History of tonsillectomy  Liver cyst: surgical removal of multiple liver cysts  Anal fistula  Ectopic pregnancy  History of appendectomy  H/O shoulder replacement: right        REVIEW OF SYSTEMS:    CONSTITUTIONAL:  No distress    HEENT:  Eyes:  No diplopia or blurred vision. ENT:  No earache, sore throat or runny nose.    CARDIOVASCULAR:  No pressure, squeezing, tightness, heaviness or aching about the chest; no palpitations.    RESPIRATORY:  + cough, shortness of breath, PND and orthopnea. Worse SOBOE    GASTROINTESTINAL:  No nausea, vomiting or diarrhea.    GENITOURINARY:  No dysuria, frequency or urgency.    NEUROLOGIC:  No paresthesias, fasciculations, seizures or weakness.    PSYCHIATRIC:  No disorder of thought or mood.    Vital Signs Last 24 Hrs  T(C): 36.7 (15 Feb 2019 07:28), Max: 37.1 (15 Feb 2019 00:03)  T(F): 98 (15 Feb 2019 07:28), Max: 98.7 (15 Feb 2019 00:03)  HR: 71 (15 Feb 2019 07:28) (68 - 80)  BP: 157/67 (15 Feb 2019 07:28) (118/61 - 157/67)  BP(mean): --  RR: 20 (15 Feb 2019 07:28) (18 - 20)  SpO2: 100% (15 Feb 2019 07:28) (93% - 100%)    PHYSICAL EXAMINATION:    GENERAL: The patient is awake and alert in no apparent distress.     HEENT: Head is normocephalic and atraumatic. Extraocular muscles are intact. Mucous membranes are moist.    NECK: Supple.    LUNGS: Mild b/l wheeze with bibasilar rales; respirations unlabored    HEART: Regular rate and rhythm with murmur.    ABDOMEN: Soft, nontender, and nondistended.      EXTREMITIES: Without any cyanosis, clubbing, with mild edema.    NEUROLOGIC: Grossly intact.    LABS:                        12.7   14.7  )-----------( 371      ( 15 Feb 2019 06:45 )             41.9     02-15    150<H>  |  101  |  21.0<H>  ----------------------------<  106  4.6   |  40.0<H>  |  0.55    Ca    9.2      15 Feb 2019 06:45  Mg     2.3     02-15      Serum Pro-Brain Natriuretic Peptide: 2883 pg/mL (02-12-19 @ 13:14)      Procalcitonin, Serum: 0.05 ng/mL (02-12-19 @ 18:22)      MICROBIOLOGY:    Culture - Urine (02.12.19 @ 18:20)    Specimen Source: .Urine    Culture Results:   No growth    Rapid Respiratory Viral Panel (02.12.19 @ 13:13)    Rapid RVP Result: NotDetec: This Respiratory Panel uses polymerase chain reaction (PCR) to detect for  adenovirus; coronavirus (HKU1, NL63, 229E, OC43); human metapneumovirus  (hMPV); human enterovirus/rhinovirus (Entero/RV); influenza A; influenza  A/H1; influenza A/H3; influenza A/H1-2009; influenza B; parainfluenza  viruses 1, 2, 3, 4; respiratory syncytial virus; Mycoplasma pneumoniae;  and Chlamydophila pneumoniae.        RADIOLOGY & ADDITIONAL STUDIES:     EXAM:  CT ANGIO CHEST (W)AW IC                          PROCEDURE DATE:  02/12/2019          INTERPRETATION:  CLINICAL INFORMATION: Shortness of breath. Evaluate for   pulmonary embolism versus pneumonia.    COMPARISON: Chest x-ray of the same dayand CT chest 4/2/2018    PROCEDURE:       FINDINGS:    CHEST:     LUNGS AND LARGE AIRWAYS: Patent central airways.  Mild emphysematous   changes. Right upper and lower lobe opacities and bronchial wall   thickening and right middle, left lower and upper lobe bronchial wall   thickening (left lower lobe greater than left upper lobe), likely   infectious in etiology. Areas of mucous plugging are noted. Subsegmental   atelectasis is seen at the left lung base. Curvilinear calcifications in   the right middle lobe (series 7 image 285), likely inspissated secretions.  PLEURA: Small bilateral pleural effusions.  VESSELS: Valuation of the pulmonary arteries demonstrates no pulmonary.   Thoracic aorta normal in caliber. Prominent main pulmonary artery   measuring 3.4 cm caliber which can be seen in the setting of pulmonary   artery hypertension.  HEART: Enlarged. Small pericardial effusion.  MEDIASTINUM AND TIFFANY: Increased soft tissue in the subcarinal region, new   since 4/2/2018, possibly an enlarged lymph node with evaluation limited   secondary to streak artifacts imaging is contrast. The area of soft   tissue fullness measures approximately 3.3 x 1.7 cm.  CHEST WALL AND LOWER NECK: Within normal limits.  VISUALIZED UPPER ABDOMEN: Status post cholecystectomy. Multiple hepatic   cysts including cyst with calcified internal septations with the largest   cystic partially imaged cyst measuring in 0.2 cm in the right hepatic   lobe. 3 cm left nodule, unchanged, likely an adenoma.  BONES: Right shoulder arthroplasty. Levoconvex thoracic or lumbar   scoliosis.    IMPRESSION:     No pulmonary embolism.     Right upper and lower lobe opacities, likely pneumonia.    Bronchial wall thickening throughout the lungs with areas of mucous   plugging, likely reactive airways disease or bronchitis.     Small bilateral pleural effusions.    Increased soft tissue in the subcarinal region, new since 4/2/2018,   possibly reactive lymphadenopathy, however follow-up is recommended.    A follow-up noncontrast chest CT is recommended in 6-8 weeks following   treatment to assess for resolution.        RITESH WALKER   This document has been electronically signed. Feb 12 2019  5:11PM           EXAM:  XR CHEST PORTABLE IMMED 1V                          PROCEDURE DATE:  02/12/2019          INTERPRETATION:  History: Chest pain and dyspnea    Technique:  AP portable    Comparisons:  Chest x-ray dated 4/25/2017    Findings:     New acute coalescing interstitial and alveolar infiltrates   in right lung. Left lung is clear. No pleural effusions.  .    The   pulmonary vasculature and aorta are normal for age. Mild cardiomegaly .   Scoliosis thoracolumbar spine. Reverse right shoulder replacement    Impression: Diffuse infiltrates in right lung suggesting pneumonia.         ESTELLA TIAN M.D., ATTENDING RADIOLOGIST  This document has been electronically signed. Feb 12 2019  1:13PM          ECHO:      Summary:   1. Normal global left ventricular systolic function.   2. Spectral Doppler shows impaired relaxation pattern of left   ventricular myocardial filling (Grade I diastolic dysfunction).   3. There is moderate concentric left ventricular hypertrophy.   4. Degenerative mitral valve.   5. Thickening and calcification of the anterior and posterior mitral   valve leaflets.   6. Mild-moderate tricuspid regurgitation.   7. Pulmonic valve regurgitation.   8. Estimated pulmonary artery systolic pressure is 50.9 mmHg assuming a   right atrial pressure of 3 mmHg, which is consistent with moderate   pulmonary hypertension.   9. Peak transaortic gradient is 41.3 mmHg, mean transaortic gradient   equals 21.3 mmHg, the calculated aortic valve area equals 0.95 cm² by the   continuity equation consistent with severe aortic stenosis.  10. There is moderate aortic root calcification.  11. Incidental findings of right kidney and liver cysts.  12. Left ventricular ejection fraction, by visual estimation, is 70 to   75%.  13. Normal left ventricular internal cavity size.  14. Moderately increased LV wall thickness.    [id number] Greg Martinez MD, Electronically signed on 2/13/2019 at 10:28:24   AM

## 2019-02-15 NOTE — PROGRESS NOTE ADULT - ASSESSMENT
85y/o woman with PMH CAD, HTN, AS, MR, COPD active smoker, started on home o2 PRN about 6 months ago, comes in today with SOB, cough and fever and CXR shows right sided pneumonia.    Pneumonia  PCN allergy    - Blood culture neg   - Sputum culture not done   - Urine culture negative  - Urinary legionella Ag negative  - RVP negative   - Procalcitonin 0.05  - Chest CT result noted with multilobar opacities   - No reaction to ceftriaxone  - Continue ceftriaxone 1gm daily  - Continue Azithromycin 500mg daily.   - TTE result noted, possibly COPD/Emphysema and AS contributing to her SOB.     Will follow.

## 2019-02-15 NOTE — PROGRESS NOTE ADULT - SUBJECTIVE AND OBJECTIVE BOX
HEALTH ISSUES - PROBLEM Dx:    87y/o woman with PMH CAD, HTN, AS, MR, COPD active smoker, started on home o2 PRN about 6 months ago, comes in today with SOB x 4 days.    CC- PNA, COPD    INTERVAL HPI/ OVERNIGHT EVENTS:    overnight hypoxia episodes with NC O2  very tired  with minimal movement hypoxic and tired    REVIEW OF SYSTEMS:    fever - cough - sob -    Vital Signs Last 24 Hrs  T(C): 36.7 (15 Feb 2019 15:21), Max: 37.1 (15 Feb 2019 00:03)  T(F): 98.1 (15 Feb 2019 15:21), Max: 98.7 (15 Feb 2019 00:03)  HR: 70 (15 Feb 2019 15:21) (68 - 87)  BP: 116/59 (15 Feb 2019 15:21) (116/59 - 157/67)  BP(mean): --  RR: 19 (15 Feb 2019 15:21) (18 - 20)  SpO2: 90% (15 Feb 2019 15:21) (90% - 100%)    PHYSICAL EXAM-  GENERAL: Dyspneic, well-groomed, well-developed, frail built  	HEAD:  Atraumatic, Normocephalic  	EYES: EOMI, conjunctiva and sclera clear  	NECK:  No JVD, Normal thyroid  	NERVOUS SYSTEM:  Alert & Oriented X 3, Motor Strength 4/5 B/L upper and lower extremities  	CHEST/LUNG: Scattered rales and rhonchi gutierrez, No wheezing, or rubs  	HEART: Regular rate and rhythm; Systolic murmur +, No rubs, or gallops  	ABDOMEN: Soft, Nontender, Nondistended; Bowel sounds present  	EXTREMITIES:  2+ Peripheral Pulses, No clubbing, cyanosis, or edema  SKIN: No rashes or lesion  LABS:                        12.7   14.7  )-----------( 371      ( 15 Feb 2019 06:45 )             41.9     02-15    150<H>  |  101  |  21.0<H>  ----------------------------<  106  4.6   |  40.0<H>  |  0.55    Ca    9.2      15 Feb 2019 06:45  Mg     2.3     02-15      Assessment and Plan

## 2019-02-15 NOTE — PROGRESS NOTE ADULT - ASSESSMENT
85y/o woman with PMH CAD, HTN, AS, MR, COPD active smoker on Home O2 PRN    1. Cough, sob, subjective fever, CXR findings confirmed by CTA- CAP-   - Rocephin + Zithromax  - ID and Pulm consult appreciated  - DUonebs, O2,   - Blood c/s testing  - RVP negative  - PE ruled out  - since hypoxia with oxygen noted, increased solu medrol to q 6 hrs on 2/15    2. COPD exacerbation acute- sec to above  - in addition to above, Solumedrol  - increased to q 6 hrs on 2/15    3. Severe AS  - Rpt ECHO noted    4. Elevated BNP  - as above  - likely a component of acute on chronic HFpEF  - was on low dose Lasix IV, but since pt very tired, will switch to lasix po 20 mg daily on 2/15    5. HTN, CAD  - Controlled, stable  - Cont home meds    Lovenox    daughter wants cardiology to evaluate pt for valvuloplasty this admission 85y/o woman with PMH CAD, HTN, AS, MR, COPD active smoker on Home O2 PRN    1. Cough, sob, subjective fever, CXR findings confirmed by CTA- CAP-   - Rocephin + Zithromax  - ID and Pulm consult appreciated  - DUonebs, O2,   - Blood c/s testing  - RVP negative  - PE ruled out  - since hypoxia with oxygen noted, increased solu medrol to q 6 hrs on 2/15    2. COPD exacerbation acute- sec to above  - in addition to above, Solumedrol  - increased to q 6 hrs on 2/15    6. Hypernatremia  - Encourage PO free water  - likely sec to diuresing    3. Severe AS  - Rpt ECHO noted  - cardiology offered valvuloplasty as an option    4. Elevated BNP  - as above  - likely a component of acute on chronic HFpEF  - was on low dose Lasix IV, but since pt very tired, will switch to lasix po 20 mg daily on 2/15    5. HTN, CAD  - Controlled, stable  - Cont home meds    Lovenox    daughter wants cardiology to evaluate pt for valvuloplasty this admission

## 2019-02-15 NOTE — PROGRESS NOTE ADULT - ASSESSMENT
Severe COPD  Home 02  Severe AS  Component of CHF; BNP elevated  Pneumonia  Worsening dyspnea likely multifactorial  Chest CT with bronchial wall thickening and mucus plugging, severe emphysema, new infiltrates , R predominant with septal thickening, bilat effusions  Subcarinal fullness noted ,suspect reactive adenopathy, will need to follow CT  Not bronchospastic currently  Procal normal    Plan:    Drug nebs  IV steroids - decrease as tolerates  O2 - decrease as able  Complete abx per ID  Diurese as needed  Possible eventual TAVR  Cardiology f/u   F/u chest CT and CXR for improvement with therapy  Prognosis guarded, given age co morbidity      D/w daughter at bedside

## 2019-02-16 LAB
ANION GAP SERPL CALC-SCNC: 8 MMOL/L — SIGNIFICANT CHANGE UP (ref 5–17)
BUN SERPL-MCNC: 21 MG/DL — HIGH (ref 8–20)
CALCIUM SERPL-MCNC: 9.2 MG/DL — SIGNIFICANT CHANGE UP (ref 8.6–10.2)
CHLORIDE SERPL-SCNC: 96 MMOL/L — LOW (ref 98–107)
CO2 SERPL-SCNC: 41 MMOL/L — HIGH (ref 22–29)
CREAT SERPL-MCNC: 0.57 MG/DL — SIGNIFICANT CHANGE UP (ref 0.5–1.3)
GLUCOSE SERPL-MCNC: 136 MG/DL — HIGH (ref 70–115)
POTASSIUM SERPL-MCNC: 5.2 MMOL/L — SIGNIFICANT CHANGE UP (ref 3.5–5.3)
POTASSIUM SERPL-SCNC: 5.2 MMOL/L — SIGNIFICANT CHANGE UP (ref 3.5–5.3)
SODIUM SERPL-SCNC: 145 MMOL/L — SIGNIFICANT CHANGE UP (ref 135–145)

## 2019-02-16 PROCEDURE — 99232 SBSQ HOSP IP/OBS MODERATE 35: CPT

## 2019-02-16 RX ORDER — SERTRALINE 25 MG/1
12.5 TABLET, FILM COATED ORAL AT BEDTIME
Qty: 0 | Refills: 0 | Status: DISCONTINUED | OUTPATIENT
Start: 2019-02-16 | End: 2019-02-19

## 2019-02-16 RX ADMIN — Medication 20 MILLIGRAM(S): at 05:25

## 2019-02-16 RX ADMIN — ENOXAPARIN SODIUM 40 MILLIGRAM(S): 100 INJECTION SUBCUTANEOUS at 05:26

## 2019-02-16 RX ADMIN — Medication 81 MILLIGRAM(S): at 12:07

## 2019-02-16 RX ADMIN — Medication 3 MILLILITER(S): at 04:42

## 2019-02-16 RX ADMIN — Medication 40 MILLIGRAM(S): at 05:25

## 2019-02-16 RX ADMIN — Medication 40 MILLIGRAM(S): at 21:39

## 2019-02-16 RX ADMIN — CEFTRIAXONE 100 GRAM(S): 500 INJECTION, POWDER, FOR SOLUTION INTRAMUSCULAR; INTRAVENOUS at 12:07

## 2019-02-16 RX ADMIN — Medication 250 MILLIGRAM(S): at 05:25

## 2019-02-16 RX ADMIN — Medication 40 MILLIGRAM(S): at 14:26

## 2019-02-16 RX ADMIN — Medication 250 MILLIGRAM(S): at 17:24

## 2019-02-16 RX ADMIN — Medication 3 MILLILITER(S): at 20:41

## 2019-02-16 RX ADMIN — PANTOPRAZOLE SODIUM 40 MILLIGRAM(S): 20 TABLET, DELAYED RELEASE ORAL at 05:25

## 2019-02-16 RX ADMIN — ATENOLOL 25 MILLIGRAM(S): 25 TABLET ORAL at 05:25

## 2019-02-16 RX ADMIN — Medication 3 MILLILITER(S): at 08:35

## 2019-02-16 RX ADMIN — AZITHROMYCIN 250 MILLIGRAM(S): 500 TABLET, FILM COATED ORAL at 12:06

## 2019-02-16 RX ADMIN — Medication 3 MILLILITER(S): at 15:59

## 2019-02-16 RX ADMIN — SERTRALINE 12.5 MILLIGRAM(S): 25 TABLET, FILM COATED ORAL at 21:39

## 2019-02-16 NOTE — PROGRESS NOTE ADULT - ASSESSMENT
85y/o woman with PMH CAD, HTN, AS, MR, COPD active smoker on Home O2 PRN    1. Community Acquired Pneumonia (likely gram positive vs atypical bacteria)  - Completed antibiotics today  - ID and Pulm input appreciated  - Repeat CT Chest (non contrast) in 6-8 weeks to assess for resolution    2. COPD exacerbation   - Continue Steroids and Nebs  - Finished Zithromax today  - Pulmonary input appreciated    3. Severe AS  - TAVR eval as an outpatient  - Cardiology input appreciated    4. Elevated BNP  - likely a component of acute on chronic HFpEF  - continue Lasix 20 mg daily  - continue Atenolol 25 mg daily    5. CAD / HLD / HTN  - Aspirin, Simvastatin and Atenolol    6. Hypernatremia  - Resolved    DVT Prophylaxis -- Lovenox 40 mg

## 2019-02-16 NOTE — PROGRESS NOTE ADULT - ASSESSMENT
Severe COPD  Home 02  Severe AS  Component of CHF; BNP elevated  Pneumonia  Worsening dyspnea likely multifactorial  Chest CT with bronchial wall thickening and mucus plugging, severe emphysema, new infiltrates , R predominant with septal thickening, bilat effusions  Subcarinal fullness noted ,suspect reactive adenopathy, will need to follow CT  Not bronchospastic currently  Cardiology considering TAVR as outpt    Plan:    Drug nebs  IV steroids - decrease as tolerates  O2 - decrease as able  Complete abx per ID  Diurese as needed  Possible eventual TAVR  Cardiology f/u   F/u chest CT and CXR for improvement with therapy  Prognosis guarded, given age co morbidity      D/w daughter at bedside Severe COPD  Home 02  Severe AS  Component of CHF; BNP elevated  Pneumonia  Worsening dyspnea likely multifactorial  Chest CT with bronchial wall thickening and mucus plugging, severe emphysema, new infiltrates , R predominant with septal thickening, bilat effusions  Subcarinal fullness noted ,suspect reactive adenopathy, will need to follow CT  Not bronchospastic currently  Cardiology considering TAVR as outpt    Plan:    Drug nebs  IV steroids - decrease as tolerates  O2 - decrease as able  Complete abx per ID  Diurese as needed  Possible eventual TAVR  Cardiology f/u   On Lovenox for DVT prophylaxis  On PPI for GI prophylaxis  F/u chest CT and CXR for improvement with therapy  Prognosis guarded, given age co morbidity      D/w daughter at bedside

## 2019-02-16 NOTE — PROGRESS NOTE ADULT - SUBJECTIVE AND OBJECTIVE BOX
PULMONARY PROGRESS NOTE      EL SHER  MRN-[id number]    Patient is a 86y old  Female who presents with a chief complaint of unknwon (15 Feb 2019 12:32)      INTERVAL HPI/OVERNIGHT EVENTS:    Patient is awake and alert  Sitting in chair  Feels slightly better but persistent SOB, especially on exertion    MEDICATIONS  (STANDING):  ALBUTerol    90 MICROgram(s) HFA Inhaler 1 Puff(s) Inhalation every 4 hours  ALBUTerol/ipratropium for Nebulization 3 milliLiter(s) Nebulizer every 6 hours  aspirin enteric coated 81 milliGRAM(s) Oral daily  ATENolol  Tablet 25 milliGRAM(s) Oral daily  azithromycin   Tablet 250 milliGRAM(s) Oral daily  cefTRIAXone   IVPB 1 Gram(s) IV Intermittent every 24 hours  enoxaparin Injectable 40 milliGRAM(s) SubCutaneous every 24 hours  furosemide    Tablet 20 milliGRAM(s) Oral daily  methylPREDNISolone sodium succinate Injectable 40 milliGRAM(s) IV Push every 6 hours  pantoprazole    Tablet 40 milliGRAM(s) Oral before breakfast  saccharomyces boulardii 250 milliGRAM(s) Oral two times a day  sertraline 12.5 milliGRAM(s) Oral daily  simvastatin 20 milliGRAM(s) Oral at bedtime  tiotropium 18 MICROgram(s) Capsule 1 Capsule(s) Inhalation daily      MEDICATIONS  (PRN):  acetaminophen   Tablet .. 650 milliGRAM(s) Oral every 6 hours PRN Temp greater or equal to 38C (100.4F), Mild Pain (1 - 3)      Allergies    penicillin (Rash)  sulfur hexafluoride (Anaphylaxis; Rash)    Intolerances        PAST MEDICAL & SURGICAL HISTORY:  Liver, polycystic  Pericardial effusion  Mitral valve insufficiency  Aortic stenosis  Aortic valve insufficiency  Smoker unmotivated to quit  Humerus fracture, right, sequela: residual weakness  Left ventricular outflow tract obstruction  SOB (shortness of breath)  BENITEZ (dyspnea on exertion)  Unstable angina  Arthritis: osteoarthritis  Hiatal hernia  Cataract  HTN (hypertension)  HLD (hyperlipidemia)  COPD (chronic obstructive pulmonary disease)  Macular degeneration of both eyes  History of cholecystectomy  History of coronary angiogram: dr saenz  [facility]  2016  History of tonsillectomy  Liver cyst: surgical removal of multiple liver cysts  Anal fistula  Ectopic pregnancy  History of appendectomy  H/O shoulder replacement: right        REVIEW OF SYSTEMS:    CONSTITUTIONAL:  No distress    HEENT:  Eyes:  No diplopia or blurred vision. ENT:  No earache, sore throat or runny nose.    CARDIOVASCULAR:  No pressure, squeezing, tightness, heaviness or aching about the chest; no palpitations.    RESPIRATORY:  Improved cough, shortness of breath, PND and orthopnea. + SOBOE    GASTROINTESTINAL:  No nausea, vomiting or diarrhea.    GENITOURINARY:  No dysuria, frequency or urgency.    NEUROLOGIC:  No paresthesias, fasciculations, seizures or weakness.    PSYCHIATRIC:  No disorder of thought or mood.    Vital Signs Last 24 Hrs  T(C): 36.3 (16 Feb 2019 07:15), Max: 36.8 (15 Feb 2019 23:41)  T(F): 97.4 (16 Feb 2019 07:15), Max: 98.2 (15 Feb 2019 23:41)  HR: 65 (16 Feb 2019 08:35) (65 - 76)  BP: 132/72 (16 Feb 2019 07:15) (116/59 - 132/72)  BP(mean): --  RR: 18 (16 Feb 2019 07:15) (18 - 19)  SpO2: 99% (16 Feb 2019 08:35) (90% - 99%)    PHYSICAL EXAMINATION:    GENERAL: The patient is awake and alert in no apparent distress.     HEENT: Head is normocephalic and atraumatic. Extraocular muscles are intact. Mucous membranes are moist.    NECK: Supple.    LUNGS: Bibasilar rales otherwise CTA without wheezing or rhonchi; respirations unlabored    HEART: Regular rate and rhythm without murmur.    ABDOMEN: Soft, nontender, and nondistended.      EXTREMITIES: Without any cyanosis, clubbing, rash, lesions or edema.    NEUROLOGIC: Grossly intact.    LABS:                        12.7   14.7  )-----------( 371      ( 15 Feb 2019 06:45 )             41.9     02-16    145  |  96<L>  |  21.0<H>  ----------------------------<  136<H>  5.2   |  41.0<H>  |  0.57    Ca    9.2      16 Feb 2019 07:00  Mg     2.3     02-15        MICROBIOLOGY:    Culture - Urine (02.12.19 @ 18:20)    Specimen Source: .Urine    Culture Results:   No growth      RADIOLOGY & ADDITIONAL STUDIES:       EXAM:  CT ANGIO CHEST (W)AW IC                          PROCEDURE DATE:  02/12/2019          INTERPRETATION:  CLINICAL INFORMATION: Shortness of breath. Evaluate for   pulmonary embolism versus pneumonia.    COMPARISON: Chest x-ray of the same dayand CT chest 4/2/2018    PROCEDURE:       FINDINGS:    CHEST:     LUNGS AND LARGE AIRWAYS: Patent central airways.  Mild emphysematous   changes. Right upper and lower lobe opacities and bronchial wall   thickening and right middle, left lower and upper lobe bronchial wall   thickening (left lower lobe greater than left upper lobe), likely   infectious in etiology. Areas of mucous plugging are noted. Subsegmental   atelectasis is seen at the left lung base. Curvilinear calcifications in   the right middle lobe (series 7 image 285), likely inspissated secretions.  PLEURA: Small bilateral pleural effusions.  VESSELS: Valuation of the pulmonary arteries demonstrates no pulmonary.   Thoracic aorta normal in caliber. Prominent main pulmonary artery   measuring 3.4 cm caliber which can be seen in the setting of pulmonary   artery hypertension.  HEART: Enlarged. Small pericardial effusion.  MEDIASTINUM AND TIFFANY: Increased soft tissue in the subcarinal region, new   since 4/2/2018, possibly an enlarged lymph node with evaluation limited   secondary to streak artifacts imaging is contrast. The area of soft   tissue fullness measures approximately 3.3 x 1.7 cm.  CHEST WALL AND LOWER NECK: Within normal limits.  VISUALIZED UPPER ABDOMEN: Status post cholecystectomy. Multiple hepatic   cysts including cyst with calcified internal septations with the largest   cystic partially imaged cyst measuring in 0.2 cm in the right hepatic   lobe. 3 cm left nodule, unchanged, likely an adenoma.  BONES: Right shoulder arthroplasty. Levoconvex thoracic or lumbar   scoliosis.    IMPRESSION:     No pulmonary embolism.     Right upper and lower lobe opacities, likely pneumonia.    Bronchial wall thickening throughout the lungs with areas of mucous   plugging, likely reactive airways disease or bronchitis.     Small bilateral pleural effusions.    Increased soft tissue in the subcarinal region, new since 4/2/2018,   possibly reactive lymphadenopathy, however follow-up is recommended.    A follow-up noncontrast chest CT is recommended in 6-8 weeks following   treatment to assess for resolution.        RITESH WALKER   This document has been electronically signed. Feb 12 2019  5:11PM        ECHO:      Summary:   1. Normal global left ventricular systolic function.   2. Spectral Doppler shows impaired relaxation pattern of left   ventricular myocardial filling (Grade I diastolic dysfunction).   3. There is moderate concentric left ventricular hypertrophy.   4. Degenerative mitral valve.   5. Thickening and calcification of the anterior and posterior mitral   valve leaflets.   6. Mild-moderate tricuspid regurgitation.   7. Pulmonic valve regurgitation.   8. Estimated pulmonary artery systolic pressure is 50.9 mmHg assuming a   right atrial pressure of 3 mmHg, which is consistent with moderate   pulmonary hypertension.   9. Peak transaortic gradient is 41.3 mmHg, mean transaortic gradient   equals 21.3 mmHg, the calculated aortic valve area equals 0.95 cm² by the   continuity equation consistent with severe aortic stenosis.  10. There is moderate aortic root calcification.  11. Incidental findings of right kidney and liver cysts.  12. Left ventricular ejection fraction, by visual estimation, is 70 to   75%.  13. Normal left ventricular internal cavity size.  14. Moderately increased LV wall thickness.    [id number] Greg Martinez MD, Electronically signed on 2/13/2019 at 10:28:24   AM

## 2019-02-16 NOTE — PROGRESS NOTE ADULT - SUBJECTIVE AND OBJECTIVE BOX
Pneumonia    HPI:  87y/o woman with PMH CAD, HTN, AS, MR, COPD active smoker, started on home o2 PRN about 6 months ago, comes in today with SOB x 4 days. In last few days she was using her O2 more often but still had SOB. She has nonproductive cough and fever x 2 d as well. No chest pain or any GI symptoms.    Patient has underlying COPD on Anoro and drug nebs, PRN use of oxygen at Home  Hx aortic valve disease--followed by Dr. Olea and was to have ECHO soon  HTN  Macular degeneration    She is severely [place] and history obtained from daughter at bedside    SH- active smoker, denies alcohol use, lives with daughter  FH- she cannot remember any medical history of her family (12 Feb 2019 19:01)    Interval History:  Patient was seen and examined at bedside around 11:45 am. Complaining of exertional dyspnea and intermittent chest pain.   Denies palpitations, headache, dizziness, visual symptoms, nausea, vomiting or abdominal pain.  No overnight issues.    ROS:  As per interval history otherwise unremarkable.    PHYSICAL EXAM:  Vital Signs   T(C): 36.3 (16 Feb 2019 07:15), Max: 36.8 (15 Feb 2019 23:41)  T(F): 97.4 (16 Feb 2019 07:15), Max: 98.2 (15 Feb 2019 23:41)  HR: 65 (16 Feb 2019 08:35) (65 - 76)  BP: 132/72 (16 Feb 2019 07:15) (116/59 - 132/72)  RR: 18 (16 Feb 2019 07:15) (18 - 19)  SpO2: 99% (16 Feb 2019 08:35) (90% - 99%)  General: Elderly female sitting in chair comfortably. No acute distress.  HEENT: PERRLA. EOMI. Clear conjunctivae. Dry mucus membrane. Hard of hearing.   Neck: Supple. No JVD.   Chest: Good air entry. Crackles at bases. No wheezing or rhonchi. No chest wall tenderness.  Heart: Normal S1 & S2. RRR.   Abdomen: Soft. Non-tender. Non-distended. + BS  Ext: No pedal edema. No calf tenderness   Neuro: Active and alert. No focal deficit. No speech disorder  Skin: Warm and Dry  Psychiatry: Normal mood and affect    MEDICATIONS  (STANDING):  ALBUTerol    90 MICROgram(s) HFA Inhaler 1 Puff(s) Inhalation every 4 hours  ALBUTerol/ipratropium for Nebulization 3 milliLiter(s) Nebulizer every 6 hours  aspirin enteric coated 81 milliGRAM(s) Oral daily  ATENolol  Tablet 25 milliGRAM(s) Oral daily  enoxaparin Injectable 40 milliGRAM(s) SubCutaneous every 24 hours  furosemide    Tablet 20 milliGRAM(s) Oral daily  methylPREDNISolone sodium succinate Injectable 40 milliGRAM(s) IV Push every 8 hours  pantoprazole    Tablet 40 milliGRAM(s) Oral before breakfast  saccharomyces boulardii 250 milliGRAM(s) Oral two times a day  sertraline 12.5 milliGRAM(s) Oral daily  simvastatin 20 milliGRAM(s) Oral at bedtime  tiotropium 18 MICROgram(s) Capsule 1 Capsule(s) Inhalation daily    MEDICATIONS  (PRN):  acetaminophen   Tablet .. 650 milliGRAM(s) Oral every 6 hours PRN Temp greater or equal to 38C (100.4F), Mild Pain (1 - 3)    LABS:                        12.7   14.7  )-----------( 371      ( 15 Feb 2019 06:45 )             41.9     02-16    145  |  96<L>  |  21.0<H>  ----------------------------<  136<H>  5.2   |  41.0<H>  |  0.57    Ca    9.2      16 Feb 2019 07:00  Mg     2.3     02-15    RADIOLOGY & ADDITIONAL STUDIES:  Reviewed

## 2019-02-16 NOTE — PROGRESS NOTE ADULT - SUBJECTIVE AND OBJECTIVE BOX
[facility]  INFECTIOUS DISEASES AND INTERNAL MEDICINE at [place]  =======================================================  Akbar Gonzalez MD  Diplomates American Board of Internal Medicine and Infectious Diseases  =======================================================    EL SHER [id number]    Follow up: Pneumonia    No fever or chills  No complaints     Allergies:  penicillin (Rash)  sulfur hexafluoride (Anaphylaxis; Rash)      Antibiotics:  Completed Azithromycin and Ceftriaxone       REVIEW OF SYSTEMS:  CONSTITUTIONAL:  No Fever or chills  HEENT:   No diplopia or blurred vision.  No earache, sore throat or runny nose.  CARDIOVASCULAR:  No pressure, squeezing, strangling, tightness, heaviness or aching about the chest, neck, axilla or epigastrium.  RESPIRATORY:  + shortness of breath  GASTROINTESTINAL:  No nausea, vomiting or diarrhea.  GENITOURINARY:  No dysuria, frequency or urgency.  MUSCULOSKELETAL:  no joint aches, no muscle pain  SKIN:  No change in skin, hair or nails.  NEUROLOGIC:  No seizures or headaches  PSYCHIATRIC:  No disorder of thought or mood.  ENDOCRINE:  No heat or cold intolerance  HEMATOLOGICAL:  No easy bruising or bleeding.       Physical Exam:  Vital Signs Last 24 Hrs  T(C): 36.3 (16 Feb 2019 07:15), Max: 36.8 (15 Feb 2019 23:41)  T(F): 97.4 (16 Feb 2019 07:15), Max: 98.2 (15 Feb 2019 23:41)  HR: 65 (16 Feb 2019 08:35) (65 - 76)  BP: 132/72 (16 Feb 2019 07:15) (116/59 - 132/72)  RR: 18 (16 Feb 2019 07:15) (18 - 19)  SpO2: 99% (16 Feb 2019 08:35) (90% - 99%)      GEN: NAD, pleasant  HEENT: normocephalic and atraumatic. EOMI. PERRL.  Anicteric   NECK: Supple.   LUNGS: Minimal crackles lower bases. no wheezing  HEART: Regular rate and rhythm   ABDOMEN: Soft, nontender, and nondistended.  Positive bowel sounds.    : No CVA tenderness  EXTREMITIES: Without any edema.  MSK: no joint swelling  NEUROLOGIC: No focal deficits  PSYCHIATRIC: Appropriate affect .  SKIN: No Rash      Labs:  02-16    145  |  96<L>  |  21.0<H>  ----------------------------<  136<H>  5.2   |  41.0<H>  |  0.57    Ca    9.2      16 Feb 2019 07:00  Mg     2.3     02-15                 12.7   14.7  )-----------( 371      ( 15 Feb 2019 06:45 )             41.9       RECENT CULTURES:  02-12 @ 18:20 .Urine     No growth      02-12 @ 13:44 .Blood     No growth at 48 hours      02-12 @ 13:13    [facility]

## 2019-02-16 NOTE — PROGRESS NOTE ADULT - ASSESSMENT
87y/o woman with PMH CAD, HTN, AS, MR, COPD active smoker, started on home o2 PRN about 6 months ago, comes in today with SOB, cough and fever and CXR shows right sided pneumonia.    Pneumonia  PCN allergy    - Blood culture neg   - Sputum culture not done   - Urine culture negative  - Urinary legionella Ag negative  - RVP negative   - Procalcitonin 0.05  - Chest CT result noted with multilobar opacities   - No reaction to ceftriaxone  - Completed ceftriaxone and Azithromycin    - TTE result noted, possibly COPD/Emphysema and AS contributing to her SOB.     Will follow.

## 2019-02-17 LAB
CULTURE RESULTS: SIGNIFICANT CHANGE UP
CULTURE RESULTS: SIGNIFICANT CHANGE UP
SPECIMEN SOURCE: SIGNIFICANT CHANGE UP
SPECIMEN SOURCE: SIGNIFICANT CHANGE UP

## 2019-02-17 PROCEDURE — 99232 SBSQ HOSP IP/OBS MODERATE 35: CPT

## 2019-02-17 RX ADMIN — ENOXAPARIN SODIUM 40 MILLIGRAM(S): 100 INJECTION SUBCUTANEOUS at 06:05

## 2019-02-17 RX ADMIN — Medication 250 MILLIGRAM(S): at 06:00

## 2019-02-17 RX ADMIN — Medication 250 MILLIGRAM(S): at 18:24

## 2019-02-17 RX ADMIN — PANTOPRAZOLE SODIUM 40 MILLIGRAM(S): 20 TABLET, DELAYED RELEASE ORAL at 06:01

## 2019-02-17 RX ADMIN — Medication 3 MILLILITER(S): at 10:06

## 2019-02-17 RX ADMIN — Medication 3 MILLILITER(S): at 03:45

## 2019-02-17 RX ADMIN — Medication 3 MILLILITER(S): at 16:31

## 2019-02-17 RX ADMIN — Medication 40 MILLIGRAM(S): at 06:02

## 2019-02-17 RX ADMIN — ATENOLOL 25 MILLIGRAM(S): 25 TABLET ORAL at 06:01

## 2019-02-17 RX ADMIN — Medication 3 MILLILITER(S): at 19:52

## 2019-02-17 RX ADMIN — Medication 20 MILLIGRAM(S): at 06:00

## 2019-02-17 RX ADMIN — SERTRALINE 12.5 MILLIGRAM(S): 25 TABLET, FILM COATED ORAL at 21:19

## 2019-02-17 RX ADMIN — Medication 81 MILLIGRAM(S): at 11:53

## 2019-02-17 RX ADMIN — Medication 40 MILLIGRAM(S): at 18:24

## 2019-02-17 NOTE — PROGRESS NOTE ADULT - SUBJECTIVE AND OBJECTIVE BOX
PULMONARY PROGRESS NOTE      EL SHER  MRN-[id number]    Patient is a 86y old  Female who presents with a chief complaint of AS (17 Feb 2019 10:03)      INTERVAL HPI/OVERNIGHT EVENTS:    Patient is awake and alert  Feels better    MEDICATIONS  (STANDING):  ALBUTerol    90 MICROgram(s) HFA Inhaler 1 Puff(s) Inhalation every 4 hours  ALBUTerol/ipratropium for Nebulization 3 milliLiter(s) Nebulizer every 6 hours  aspirin enteric coated 81 milliGRAM(s) Oral daily  ATENolol  Tablet 25 milliGRAM(s) Oral daily  enoxaparin Injectable 40 milliGRAM(s) SubCutaneous every 24 hours  furosemide    Tablet 20 milliGRAM(s) Oral daily  methylPREDNISolone sodium succinate Injectable 40 milliGRAM(s) IV Push every 8 hours  pantoprazole    Tablet 40 milliGRAM(s) Oral before breakfast  saccharomyces boulardii 250 milliGRAM(s) Oral two times a day  sertraline 12.5 milliGRAM(s) Oral at bedtime  simvastatin 20 milliGRAM(s) Oral at bedtime  tiotropium 18 MICROgram(s) Capsule 1 Capsule(s) Inhalation daily      MEDICATIONS  (PRN):  acetaminophen   Tablet .. 650 milliGRAM(s) Oral every 6 hours PRN Temp greater or equal to 38C (100.4F), Mild Pain (1 - 3)      Allergies    penicillin (Rash)  sulfur hexafluoride (Anaphylaxis; Rash)    Intolerances        PAST MEDICAL & SURGICAL HISTORY:  Liver, polycystic  Pericardial effusion  Mitral valve insufficiency  Aortic stenosis  Aortic valve insufficiency  Smoker unmotivated to quit  Humerus fracture, right, sequela: residual weakness  Left ventricular outflow tract obstruction  SOB (shortness of breath)  BENITEZ (dyspnea on exertion)  Unstable angina  Arthritis: osteoarthritis  Hiatal hernia  Cataract  HTN (hypertension)  HLD (hyperlipidemia)  COPD (chronic obstructive pulmonary disease)  Macular degeneration of both eyes  History of cholecystectomy  History of coronary angiogram: dr saenz  [facility]  2016  History of tonsillectomy  Liver cyst: surgical removal of multiple liver cysts  Anal fistula  Ectopic pregnancy  History of appendectomy  H/O shoulder replacement: right        REVIEW OF SYSTEMS:    CONSTITUTIONAL:  No distress    HEENT:  Eyes:  No diplopia or blurred vision. ENT:  No earache, sore throat or runny nose.    CARDIOVASCULAR:  No pressure, squeezing, tightness, heaviness or aching about the chest; no palpitations.    RESPIRATORY:  Improved cough, shortness of breath, PND or orthopnea. + SOBOE    GASTROINTESTINAL:  No nausea, vomiting or diarrhea.    GENITOURINARY:  No dysuria, frequency or urgency.    NEUROLOGIC:  No paresthesias, fasciculations, seizures or weakness.    PSYCHIATRIC:  No disorder of thought or mood.    Vital Signs Last 24 Hrs  T(C): 36.7 (17 Feb 2019 07:55), Max: 36.9 (17 Feb 2019 00:17)  T(F): 98 (17 Feb 2019 07:55), Max: 98.5 (17 Feb 2019 00:17)  HR: 68 (17 Feb 2019 10:06) (65 - 85)  BP: 130/67 (17 Feb 2019 07:55) (115/62 - 136/71)  BP(mean): --  RR: 18 (17 Feb 2019 07:55) (18 - 18)  SpO2: 97% (17 Feb 2019 10:06) (92% - 99%)    PHYSICAL EXAMINATION:    GENERAL: The patient is awake and alert in no apparent distress.     HEENT: Head is normocephalic and atraumatic. Extraocular muscles are intact. Mucous membranes are moist.    NECK: Supple.    LUNGS: Clear to auscultation without wheezing or rhonchi but few rales; respirations unlabored    HEART: Regular rate and rhythm without murmur.    ABDOMEN: Soft, nontender, and nondistended.      EXTREMITIES: Without any cyanosis, clubbing, rash, lesions or edema.    NEUROLOGIC: Grossly intact.    LABS:    02-16    145  |  96<L>  |  21.0<H>  ----------------------------<  136<H>  5.2   |  41.0<H>  |  0.57    Ca    9.2      16 Feb 2019 07:00        MICROBIOLOGY:    Culture - Urine (02.12.19 @ 18:20)    Specimen Source: .Urine    Culture Results:   No growth    Culture - Blood (02.12.19 @ 13:44)    Specimen Source: .Blood    Culture Results:   No growth at 48 hours        RADIOLOGY & ADDITIONAL STUDIES:       EXAM:  CT ANGIO CHEST (W)AW IC                          PROCEDURE DATE:  02/12/2019          INTERPRETATION:  CLINICAL INFORMATION: Shortness of breath. Evaluate for   pulmonary embolism versus pneumonia.    COMPARISON: Chest x-ray of the same dayand CT chest 4/2/2018    PROCEDURE:       FINDINGS:    CHEST:     LUNGS AND LARGE AIRWAYS: Patent central airways.  Mild emphysematous   changes. Right upper and lower lobe opacities and bronchial wall   thickening and right middle, left lower and upper lobe bronchial wall   thickening (left lower lobe greater than left upper lobe), likely   infectious in etiology. Areas of mucous plugging are noted. Subsegmental   atelectasis is seen at the left lung base. Curvilinear calcifications in   the right middle lobe (series 7 image 285), likely inspissated secretions.  PLEURA: Small bilateral pleural effusions.  VESSELS: Valuation of the pulmonary arteries demonstrates no pulmonary.   Thoracic aorta normal in caliber. Prominent main pulmonary artery   measuring 3.4 cm caliber which can be seen in the setting of pulmonary   artery hypertension.  HEART: Enlarged. Small pericardial effusion.  MEDIASTINUM AND TIFFANY: Increased soft tissue in the subcarinal region, new   since 4/2/2018, possibly an enlarged lymph node with evaluation limited   secondary to streak artifacts imaging is contrast. The area of soft   tissue fullness measures approximately 3.3 x 1.7 cm.  CHEST WALL AND LOWER NECK: Within normal limits.  VISUALIZED UPPER ABDOMEN: Status post cholecystectomy. Multiple hepatic   cysts including cyst with calcified internal septations with the largest   cystic partially imaged cyst measuring in 0.2 cm in the right hepatic   lobe. 3 cm left nodule, unchanged, likely an adenoma.  BONES: Right shoulder arthroplasty. Levoconvex thoracic or lumbar   scoliosis.    IMPRESSION:     No pulmonary embolism.     Right upper and lower lobe opacities, likely pneumonia.    Bronchial wall thickening throughout the lungs with areas of mucous   plugging, likely reactive airways disease or bronchitis.     Small bilateral pleural effusions.    Increased soft tissue in the subcarinal region, new since 4/2/2018,   possibly reactive lymphadenopathy, however follow-up is recommended.    A follow-up noncontrast chest CT is recommended in 6-8 weeks following   treatment to assess for resolution.        RITESH WALKER   This document has been electronically signed. Feb 12 2019  5:11PM          ECHO:      Summary:   1. Normal global left ventricular systolic function.   2. Spectral Doppler shows impaired relaxation pattern of left   ventricular myocardial filling (Grade I diastolic dysfunction).   3. There is moderate concentric left ventricular hypertrophy.   4. Degenerative mitral valve.   5. Thickening and calcification of the anterior and posterior mitral   valve leaflets.   6. Mild-moderate tricuspid regurgitation.   7. Pulmonic valve regurgitation.   8. Estimated pulmonary artery systolic pressure is 50.9 mmHg assuming a   right atrial pressure of 3 mmHg, which is consistent with moderate   pulmonary hypertension.   9. Peak transaortic gradient is 41.3 mmHg, mean transaortic gradient   equals 21.3 mmHg, the calculated aortic valve area equals 0.95 cm² by the   continuity equation consistent with severe aortic stenosis.  10. There is moderate aortic root calcification.  11. Incidental findings of right kidney and liver cysts.  12. Left ventricular ejection fraction, by visual estimation, is 70 to   75%.  13. Normal left ventricular internal cavity size.  14. Moderately increased LV wall thickness.    [id number] Greg Martinez MD, Electronically signed on 2/13/2019 at 10:28:24   AM

## 2019-02-17 NOTE — PROGRESS NOTE ADULT - SUBJECTIVE AND OBJECTIVE BOX
[facility]  INFECTIOUS DISEASES AND INTERNAL MEDICINE at [place]  =======================================================  Akbar Gonzalez MD  Diplomates American Board of Internal Medicine and Infectious Diseases  =======================================================    EL SHER [id number]    Follow up: Pneumonia    No fever or chills  No complaints     Allergies:  penicillin (Rash)  sulfur hexafluoride (Anaphylaxis; Rash)      Antibiotics:  Completed Azithromycin and Ceftriaxone       REVIEW OF SYSTEMS:  CONSTITUTIONAL:  No Fever or chills  HEENT:   No diplopia or blurred vision.  No earache, sore throat or runny nose.  CARDIOVASCULAR:  No pressure, squeezing, strangling, tightness, heaviness or aching about the chest, neck, axilla or epigastrium.  RESPIRATORY:  + shortness of breath  GASTROINTESTINAL:  No nausea, vomiting or diarrhea.  GENITOURINARY:  No dysuria, frequency or urgency.  MUSCULOSKELETAL:  no joint aches, no muscle pain  SKIN:  No change in skin, hair or nails.  NEUROLOGIC:  No seizures or headaches  PSYCHIATRIC:  No disorder of thought or mood.  ENDOCRINE:  No heat or cold intolerance  HEMATOLOGICAL:  No easy bruising or bleeding.       Physical Exam:  Vital Signs Last 24 Hrs  T(C): 36.9 (17 Feb 2019 00:17), Max: 36.9 (17 Feb 2019 00:17)  T(F): 98.5 (17 Feb 2019 00:17), Max: 98.5 (17 Feb 2019 00:17)  HR: 85 (17 Feb 2019 05:15) (65 - 85)  BP: 136/71 (17 Feb 2019 05:15) (115/62 - 136/71)  RR: 18 (17 Feb 2019 00:17) (18 - 18)  SpO2: 95% (17 Feb 2019 03:45) (94% - 99%)      GEN: NAD, pleasant  HEENT: normocephalic and atraumatic. EOMI. PERRL.  Anicteric   NECK: Supple.   LUNGS: Minimal crackles lower bases. no wheezing  HEART: Regular rate and rhythm   ABDOMEN: Soft, nontender, and nondistended.  Positive bowel sounds.    : No CVA tenderness  EXTREMITIES: Without any edema.  MSK: no joint swelling  NEUROLOGIC: No focal deficits  PSYCHIATRIC: Appropriate affect .  SKIN: No Rash      Labs:  02-16    145  |  96<L>  |  21.0<H>  ----------------------------<  136<H>  5.2   |  41.0<H>  |  0.57    Ca    9.2      16 Feb 2019 07:00      RECENT CULTURES:  02-12 @ 18:20 .Urine     No growth      02-12 @ 13:44 .Blood     No growth at 48 hours      02-12 @ 13:13    RVP  NotDete

## 2019-02-17 NOTE — PROGRESS NOTE ADULT - SUBJECTIVE AND OBJECTIVE BOX
Pneumonia    HPI:  87y/o woman with PMH CAD, HTN, AS, MR, COPD active smoker, started on home o2 PRN about 6 months ago, comes in today with SOB x 4 days. In last few days she was using her O2 more often but still had SOB. She has nonproductive cough and fever x 2 d as well. No chest pain or any GI symptoms.    Patient has underlying COPD on Anoro and drug nebs, PRN use of oxygen at Home  Hx aortic valve disease--followed by Dr. Olea and was to have ECHO soon  HTN  Macular degeneration    She is severely [place] and history obtained from daughter at bedside    SH- active smoker, denies alcohol use, lives with daughter  FH- she cannot remember any medical history of her family (12 Feb 2019 19:01)    Interval History:  Patient was seen and examined at bedside around 10:15 am.    Denies palpitations, headache, dizziness, visual symptoms, nausea, vomiting or abdominal pain.  No overnight issues.    ROS:  As per interval history otherwise unremarkable.    PHYSICAL EXAM:  Vital Signs   T(C): 36.3 (16 Feb 2019 07:15), Max: 36.8 (15 Feb 2019 23:41)  T(F): 97.4 (16 Feb 2019 07:15), Max: 98.2 (15 Feb 2019 23:41)  HR: 65 (16 Feb 2019 08:35) (65 - 76)  BP: 132/72 (16 Feb 2019 07:15) (116/59 - 132/72)  RR: 18 (16 Feb 2019 07:15) (18 - 19)  SpO2: 99% (16 Feb 2019 08:35) (90% - 99%)  General: Elderly female sitting in chair comfortably. No acute distress.  HEENT: PERRLA. EOMI. Clear conjunctivae. Dry mucus membrane. Hard of hearing.   Neck: Supple. No JVD.   Chest: Good air entry. Crackles at bases. No wheezing or rhonchi. No chest wall tenderness.  Heart: Normal S1 & S2. RRR.   Abdomen: Soft. Non-tender. Non-distended. + BS  Ext: No pedal edema. No calf tenderness   Neuro: Active and alert. No focal deficit. No speech disorder  Skin: Warm and Dry  Psychiatry: Normal mood and affect    MEDICATIONS  (STANDING):  ALBUTerol    90 MICROgram(s) HFA Inhaler 1 Puff(s) Inhalation every 4 hours  ALBUTerol/ipratropium for Nebulization 3 milliLiter(s) Nebulizer every 6 hours  aspirin enteric coated 81 milliGRAM(s) Oral daily  ATENolol  Tablet 25 milliGRAM(s) Oral daily  enoxaparin Injectable 40 milliGRAM(s) SubCutaneous every 24 hours  furosemide    Tablet 20 milliGRAM(s) Oral daily  methylPREDNISolone sodium succinate Injectable 40 milliGRAM(s) IV Push every 8 hours  pantoprazole    Tablet 40 milliGRAM(s) Oral before breakfast  saccharomyces boulardii 250 milliGRAM(s) Oral two times a day  sertraline 12.5 milliGRAM(s) Oral daily  simvastatin 20 milliGRAM(s) Oral at bedtime  tiotropium 18 MICROgram(s) Capsule 1 Capsule(s) Inhalation daily    MEDICATIONS  (PRN):  acetaminophen   Tablet .. 650 milliGRAM(s) Oral every 6 hours PRN Temp greater or equal to 38C (100.4F), Mild Pain (1 - 3)    LABS:                        12.7   14.7  )-----------( 371      ( 15 Feb 2019 06:45 )             41.9     02-16    145  |  96<L>  |  21.0<H>  ----------------------------<  136<H>  5.2   |  41.0<H>  |  0.57    Ca    9.2      16 Feb 2019 07:00  Mg     2.3     02-15    RADIOLOGY & ADDITIONAL STUDIES:  Reviewed Pneumonia    HPI:  87y/o woman with PMH CAD, HTN, AS, MR, COPD active smoker, started on home o2 PRN about 6 months ago, comes in today with SOB x 4 days. In last few days she was using her O2 more often but still had SOB. She has nonproductive cough and fever x 2 d as well. No chest pain or any GI symptoms.    Patient has underlying COPD on Anoro and drug nebs, PRN use of oxygen at Home  Hx aortic valve disease--followed by Dr. Olea and was to have ECHO soon  HTN  Macular degeneration    She is severely [place] and history obtained from daughter at bedside    SH- active smoker, denies alcohol use, lives with daughter  FH- she cannot remember any medical history of her family (12 Feb 2019 19:01)    Interval History:  Patient was seen and examined at bedside around 10:15 am. Sitting in the chair comfortably but upset as we are not doing anything about her AS at this time.   Denies palpitations, headache, dizziness, visual symptoms, nausea, vomiting or abdominal pain.  No overnight issues.    ROS:  As per interval history otherwise unremarkable.    PHYSICAL EXAM:  Vital Signs   T(C): 36.7 (17 Feb 2019 07:55), Max: 36.9 (17 Feb 2019 00:17)  T(F): 98 (17 Feb 2019 07:55), Max: 98.5 (17 Feb 2019 00:17)  HR: 68 (17 Feb 2019 10:06) (65 - 85)  BP: 130/67 (17 Feb 2019 07:55) (115/62 - 136/71)  RR: 18 (17 Feb 2019 07:55) (18 - 18)  SpO2: 97% (17 Feb 2019 10:06) (92% - 99%)  General: Elderly female sitting in chair comfortably. No acute distress.  HEENT: PERRLA. EOMI. Clear conjunctivae. Dry mucus membrane. Hard of hearing.   Neck: Supple. No JVD.   Chest: Good air entry. Crackles at bases. No wheezing or rhonchi. No chest wall tenderness.  Heart: Normal S1 & S2. RRR.   Abdomen: Soft. Non-tender. Non-distended. + BS  Ext: No pedal edema. No calf tenderness   Neuro: Active and alert. No focal deficit. No speech disorder  Skin: Warm and Dry  Psychiatry: Normal mood and affect    MEDICATIONS  (STANDING):  ALBUTerol    90 MICROgram(s) HFA Inhaler 1 Puff(s) Inhalation every 4 hours  ALBUTerol/ipratropium for Nebulization 3 milliLiter(s) Nebulizer every 6 hours  aspirin enteric coated 81 milliGRAM(s) Oral daily  ATENolol  Tablet 25 milliGRAM(s) Oral daily  enoxaparin Injectable 40 milliGRAM(s) SubCutaneous every 24 hours  furosemide    Tablet 20 milliGRAM(s) Oral daily  methylPREDNISolone sodium succinate Injectable 40 milliGRAM(s) IV Push every 12 hours  pantoprazole    Tablet 40 milliGRAM(s) Oral before breakfast  saccharomyces boulardii 250 milliGRAM(s) Oral two times a day  sertraline 12.5 milliGRAM(s) Oral at bedtime  simvastatin 20 milliGRAM(s) Oral at bedtime  tiotropium 18 MICROgram(s) Capsule 1 Capsule(s) Inhalation daily    MEDICATIONS  (PRN):  acetaminophen   Tablet .. 650 milliGRAM(s) Oral every 6 hours PRN Temp greater or equal to 38C (100.4F), Mild Pain (1 - 3)    LABS:    02-16    145  |  96<L>  |  21.0<H>  ----------------------------<  136<H>  5.2   |  41.0<H>  |  0.57    Ca    9.2      16 Feb 2019 07:00    Blood Culture: 02-12 @ 18:20  Organism --  Gram Stain Blood -- Gram Stain --  Specimen Source .Urine  Culture-Blood --    02-12 @ 13:44  Organism --  Gram Stain Blood -- Gram Stain --  Specimen Source .Blood  Culture-Blood --    RADIOLOGY & ADDITIONAL STUDIES:  Reviewed

## 2019-02-17 NOTE — PROGRESS NOTE ADULT - ASSESSMENT
Severe COPD  Home 02  Severe AS with mod pulm HTN on echo  Component of CHF; BNP elevated  Pneumonia  Worsening dyspnea likely multifactorial  Chest CT with bronchial wall thickening and mucus plugging, severe emphysema, new infiltrates , R predominant with septal thickening, bilat effusions  Subcarinal fullness noted ,suspect reactive adenopathy, will need to follow CT  Not bronchospastic currently  Cardiology considering TAVR as outpt  Clinically improved    Plan:    Drug nebs  IV steroids - decrease as tolerates  Eventual prednisone with taper  O2 - decrease as able  Complete abx per ID  Diurese as needed  Possible eventual TAVR  Cardiology f/u   On Lovenox for DVT prophylaxis  On PPI for GI prophylaxis  F/u chest CT and CXR for improvement with therapy  Prognosis guarded, given age co morbidity    Ambulate/PT  Pulm f/u after d/c Severe COPD  Home 02  Severe AS with mod pulm HTN on echo  Component of CHF; BNP elevated  Pneumonia  Worsening dyspnea likely multifactorial  Chest CT with bronchial wall thickening and mucus plugging, severe emphysema, new infiltrates , R predominant with septal thickening, bilat effusions  Subcarinal fullness noted ,suspect reactive adenopathy, will need to follow CT  Not bronchospastic currently  Cardiology considering TAVR as outpt  Clinically improved    Plan:    Drug nebs  IV steroids - decrease as tolerates  Eventual prednisone with taper  O2 - decrease as able  Complete abx per ID  Diurese as needed  Possible eventual TAVR  Cardiology f/u   On Lovenox for DVT prophylaxis  On PPI for GI prophylaxis  F/u chest CT and CXR for improvement with therapy  Prognosis guarded, given age co morbidity    Ambulate/PT  Pulm f/u after d/c  Little else to add - Recall prn

## 2019-02-17 NOTE — PROGRESS NOTE ADULT - ASSESSMENT
87y/o woman with PMH CAD, HTN, AS, MR, COPD active smoker on Home O2 PRN    1. Community Acquired Pneumonia (likely gram positive vs atypical bacteria)  - Completed antibiotics today  - ID and Pulm input appreciated  - Repeat CT Chest (non contrast) in 6-8 weeks to assess for resolution    2. COPD exacerbation   - Continue Steroids and Nebs  - Finished Zithromax today  - Pulmonary input appreciated    3. Severe AS  - TAVR eval as an outpatient  - Cardiology input appreciated    4. Elevated BNP  - likely a component of acute on chronic HFpEF  - continue Lasix 20 mg daily  - continue Atenolol 25 mg daily    5. CAD / HLD / HTN  - Aspirin, Simvastatin and Atenolol    6. Hypernatremia  - Resolved    DVT Prophylaxis -- Lovenox 40 mg 85y/o woman with PMH CAD, HTN, AS, MR, COPD active smoker on Home O2 PRN    1. Community Acquired Pneumonia (likely gram positive vs atypical bacteria)  - Completed antibiotics on 2/16  - ID and Pulm input appreciated  - Repeat CT Chest (non contrast) in 6-8 weeks to assess for resolution    2. COPD exacerbation   - Continue Steroids and Nebs  - Finished Zithromax on 2/16  - Pulmonary input appreciated    3. Severe AS  - TAVR eval as an outpatient  - Cardiology input appreciated    4. Elevated BNP  - likely a component of acute on chronic HFpEF  - continue Lasix 20 mg daily  - continue Atenolol 25 mg daily    5. CAD / HLD / HTN  - Aspirin, Simvastatin and Atenolol    6. Hypernatremia  - Resolved    DVT Prophylaxis -- Lovenox 40 mg

## 2019-02-17 NOTE — PROGRESS NOTE ADULT - SUBJECTIVE AND OBJECTIVE BOX
[place] [facility] - [facility], THE HEART CENTER                                   [address] [place] [address]                                                      PHONE: [phone]                                                         FAX: [phone]  [email or web address]  ---------------------------------------------------------------------------------------------------------------------------------    FU for  Pt seen and examined. SOB improving    Overnight events/patient complaints:    penicillin (Rash)  sulfur hexafluoride (Anaphylaxis; Rash)    MEDICATIONS  (STANDING):  ALBUTerol    90 MICROgram(s) HFA Inhaler 1 Puff(s) Inhalation every 4 hours  ALBUTerol/ipratropium for Nebulization 3 milliLiter(s) Nebulizer every 6 hours  aspirin enteric coated 81 milliGRAM(s) Oral daily  ATENolol  Tablet 25 milliGRAM(s) Oral daily  enoxaparin Injectable 40 milliGRAM(s) SubCutaneous every 24 hours  furosemide    Tablet 20 milliGRAM(s) Oral daily  methylPREDNISolone sodium succinate Injectable 40 milliGRAM(s) IV Push every 8 hours  pantoprazole    Tablet 40 milliGRAM(s) Oral before breakfast  saccharomyces boulardii 250 milliGRAM(s) Oral two times a day  sertraline 12.5 milliGRAM(s) Oral at bedtime  simvastatin 20 milliGRAM(s) Oral at bedtime  tiotropium 18 MICROgram(s) Capsule 1 Capsule(s) Inhalation daily    MEDICATIONS  (PRN):  acetaminophen   Tablet .. 650 milliGRAM(s) Oral every 6 hours PRN Temp greater or equal to 38C (100.4F), Mild Pain (1 - 3)      Vital Signs Last 24 Hrs  T(C): 36.7 (17 Feb 2019 07:55), Max: 36.9 (17 Feb 2019 00:17)  T(F): 98 (17 Feb 2019 07:55), Max: 98.5 (17 Feb 2019 00:17)  HR: 65 (17 Feb 2019 07:55) (65 - 85)  BP: 130/67 (17 Feb 2019 07:55) (115/62 - 136/71)  BP(mean): --  RR: 18 (17 Feb 2019 07:55) (18 - 18)  SpO2: 92% (17 Feb 2019 07:55) (92% - 99%)  ICU Vital Signs Last 24 Hrs  I&O's Summary    16 Feb 2019 07:01  -  17 Feb 2019 07:00  --------------------------------------------------------  IN: 0 mL / OUT: 300 mL / NET: -300 mL        PHYSICAL EXAM:  General: well built, resting comfortably  HEENT: no JVD  CARDIOVASCULAR: AS+  LUNGS: decreased air entry b/l  ABDOMEN: Soft, nontender without mass or organomegaly. bowel sounds normoactive.  EXTREMITIES: no edema  Neuro: awake alert          LABS:    02-16    145  |  96<L>  |  21.0<H>  ----------------------------<  136<H>  5.2   |  41.0<H>  |  0.57    Ca    9.2      16 Feb 2019 07:00      EL SHER            RADIOLOGY & ADDITIONAL STUDIES:    LABS:    02-16    145  |  96<L>  |  21.0<H>  ----------------------------<  136<H>  5.2   |  41.0<H>  |  0.57    Ca    9.2      16 Feb 2019 07:00        Assessment and Plan:  In summary, EL SHER is an 86y Female with past medical history significant for active smoker,  copd, AS - severe presenting with sob. pt admitted with pna.   Severe AS: no signs of overt CHf. hemodynamic stable. will address AS as OP for possible TAVR once patients respiratory status improves  PNA: per medical team  Please reconsult if need arises

## 2019-02-17 NOTE — PROGRESS NOTE ADULT - ASSESSMENT
85y/o woman with PMH CAD, HTN, AS, MR, COPD active smoker, started on home o2 PRN about 6 months ago, comes in today with SOB, cough and fever and CXR shows right sided pneumonia.      Pneumonia  PCN allergy      - Blood culture neg   - Sputum culture not done   - Urine culture negative  - Urinary legionella Ag negative  - RVP negative   - Procalcitonin 0.05  - Chest CT result noted with multilobar opacities   - No reaction to ceftriaxone  - Completed ceftriaxone and Azithromycin        Will follow.

## 2019-02-18 PROCEDURE — 99232 SBSQ HOSP IP/OBS MODERATE 35: CPT

## 2019-02-18 RX ORDER — FUROSEMIDE 40 MG
40 TABLET ORAL DAILY
Qty: 0 | Refills: 0 | Status: DISCONTINUED | OUTPATIENT
Start: 2019-02-18 | End: 2019-02-19

## 2019-02-18 RX ADMIN — Medication 81 MILLIGRAM(S): at 12:25

## 2019-02-18 RX ADMIN — Medication 40 MILLIGRAM(S): at 06:18

## 2019-02-18 RX ADMIN — Medication 3 MILLILITER(S): at 09:21

## 2019-02-18 RX ADMIN — ENOXAPARIN SODIUM 40 MILLIGRAM(S): 100 INJECTION SUBCUTANEOUS at 06:17

## 2019-02-18 RX ADMIN — Medication 250 MILLIGRAM(S): at 17:35

## 2019-02-18 RX ADMIN — SERTRALINE 12.5 MILLIGRAM(S): 25 TABLET, FILM COATED ORAL at 21:45

## 2019-02-18 RX ADMIN — Medication 20 MILLIGRAM(S): at 06:17

## 2019-02-18 RX ADMIN — Medication 3 MILLILITER(S): at 03:53

## 2019-02-18 RX ADMIN — Medication 3 MILLILITER(S): at 20:35

## 2019-02-18 RX ADMIN — Medication 250 MILLIGRAM(S): at 06:17

## 2019-02-18 RX ADMIN — ATENOLOL 25 MILLIGRAM(S): 25 TABLET ORAL at 06:18

## 2019-02-18 RX ADMIN — Medication 3 MILLILITER(S): at 15:40

## 2019-02-18 RX ADMIN — PANTOPRAZOLE SODIUM 40 MILLIGRAM(S): 20 TABLET, DELAYED RELEASE ORAL at 06:18

## 2019-02-18 RX ADMIN — Medication 40 MILLIGRAM(S): at 12:26

## 2019-02-18 NOTE — DIETITIAN INITIAL EVALUATION ADULT. - PHYSICAL APPEARANCE
physical signs of malnutrition [ ]absent [x ]present. [ ]Mild [x ]Moderate [ ]Severe Muscle wasting present at [ x]clavicle [ ]interosseos [x ]calf. [ ]Mild [ x]Moderate [ ]Severe subcutaneous fat loss presents at [ ]ribs [x]orbital region [ ]triceps [ ]buccal area./debilitated

## 2019-02-18 NOTE — PHYSICAL THERAPY INITIAL EVALUATION ADULT - MD/RN NOTIFIED
Make an appointment with Dr. Logan in ophthalmology to screen for Plaquenil side effects.    Pumice stone.   yes

## 2019-02-18 NOTE — PROGRESS NOTE ADULT - SUBJECTIVE AND OBJECTIVE BOX
Pneumonia    HPI:  87y/o woman with PMH CAD, HTN, AS, MR, COPD active smoker, started on home o2 PRN about 6 months ago, comes in today with SOB x 4 days. In last few days she was using her O2 more often but still had SOB. She has nonproductive cough and fever x 2 d as well. No chest pain or any GI symptoms.    Patient has underlying COPD on Anoro and drug nebs, PRN use of oxygen at Home  Hx aortic valve disease--followed by Dr. Olea and was to have ECHO soon  HTN  Macular degeneration    She is severely [place] and history obtained from daughter at bedside    SH- active smoker, denies alcohol use, lives with daughter  FH- she cannot remember any medical history of her family (12 Feb 2019 19:01)    Interval History:  Patient was seen and examined at bedside around 9 am. Feeling better. SOB and cough are improving.    Denies palpitations, headache, dizziness, visual symptoms, nausea, vomiting or abdominal pain.  No overnight issues.    ROS:  As per interval history otherwise unremarkable.    PHYSICAL EXAM:  Vital Signs   T(C): 36.9 (18 Feb 2019 07:34), Max: 36.9 (17 Feb 2019 16:17)  T(F): 98.5 (18 Feb 2019 07:34), Max: 98.5 (18 Feb 2019 07:34)  HR: 66 (18 Feb 2019 09:22) (60 - 83)  BP: 130/68 (18 Feb 2019 07:34) (120/67 - 130/68)  RR: 18 (18 Feb 2019 07:34) (18 - 18)  SpO2: 96% (18 Feb 2019 09:22) (94% - 96%)  General: Elderly female lying in bed comfortably. No acute distress.  HEENT: PERRLA. EOMI. Clear conjunctivae. Dry mucus membrane. Hard of hearing.   Neck: Supple. No JVD.   Chest: Good air entry. Fine crackles at bases. No wheezing or rhonchi. No chest wall tenderness.  Heart: Normal S1 & S2. RRR.   Abdomen: Soft. Non-tender. Non-distended. + BS  Ext: No pedal edema. No calf tenderness   Neuro: Active and alert. No focal deficit. No speech disorder  Skin: Warm and Dry  Psychiatry: Normal mood and affect    MEDICATIONS  (STANDING):  ALBUTerol    90 MICROgram(s) HFA Inhaler 1 Puff(s) Inhalation every 4 hours  ALBUTerol/ipratropium for Nebulization 3 milliLiter(s) Nebulizer every 6 hours  aspirin enteric coated 81 milliGRAM(s) Oral daily  ATENolol  Tablet 25 milliGRAM(s) Oral daily  enoxaparin Injectable 40 milliGRAM(s) SubCutaneous every 24 hours  furosemide    Tablet 40 milliGRAM(s) Oral daily  methylPREDNISolone sodium succinate Injectable 40 milliGRAM(s) IV Push every 12 hours  pantoprazole    Tablet 40 milliGRAM(s) Oral before breakfast  saccharomyces boulardii 250 milliGRAM(s) Oral two times a day  sertraline 12.5 milliGRAM(s) Oral at bedtime  simvastatin 20 milliGRAM(s) Oral at bedtime  tiotropium 18 MICROgram(s) Capsule 1 Capsule(s) Inhalation daily    MEDICATIONS  (PRN):  acetaminophen   Tablet .. 650 milliGRAM(s) Oral every 6 hours PRN Temp greater or equal to 38C (100.4F), Mild Pain (1 - 3)    LABS:  Reviewed    RADIOLOGY & ADDITIONAL STUDIES:  Reviewed

## 2019-02-18 NOTE — CHART NOTE - NSCHARTNOTEFT_GEN_A_CORE
Upon Nutritional Assessment by the Registered Dietitian your patient was determined to meet criteria / has evidence of the following diagnosis/diagnoses:          [x ]  Moderate Protein Calorie Malnutrition          Findings as based on:  •  Comprehensive nutrition assessment and consultation        Treatment:    The following diet has been recommended:    ensure TID  PROVIDER Section:     By signing this assessment you are acknowledging and agree with the diagnosis/diagnoses assigned by the Registered Dietitian    Comments:

## 2019-02-18 NOTE — DIETITIAN INITIAL EVALUATION ADULT. - OTHER INFO
87y/o woman with PMH CAD, HTN, AS, MR, COPD active smoker, started on home o2 PRN about 6 months ago, came in with SOB and has not been eating well .

## 2019-02-18 NOTE — PROGRESS NOTE ADULT - ASSESSMENT
87y/o woman with PMH CAD, HTN, AS, MR, COPD active smoker, started on home o2 PRN about 6 months ago, comes in today with SOB, cough and fever and CXR shows right sided pneumonia.  Pneumonia  PCN allergy  - Blood culture neg   - Sputum culture not done   - Urine culture negative  - Urinary legionella Ag negative  - RVP negative   - Procalcitonin 0.05  - Chest CT result noted with multilobar opacities   - No reaction to ceftriaxone  - Completed ceftriaxone and Azithromycin    hopefully will go home soon

## 2019-02-18 NOTE — PROGRESS NOTE ADULT - ASSESSMENT
Assessmnet  PNA  O2 dep COPD  Severe AS normal EF nonobst CAD at cath 2018  HLD      Rec  cont AB for PNA  after PNA clears would dc home and arrange elective R and L cath and possible JAIRO to assess AS ? TAVR candidate  cont beta blocker and lasix  tele Assessmnet  PNA  O2 dep COPD  Severe AS normal EF nonobst CAD at cath 2018  HLD      Rec  cont AB for PNA  after PNA clears would dc home and arrange elective R and L cath and possible JAIRO to assess AS ? TAVR candidate  cont beta blocker and lasix  tele      LMOM for dtr outlining plan

## 2019-02-18 NOTE — PROGRESS NOTE ADULT - ASSESSMENT
87y/o woman with PMH CAD, HTN, AS, MR, COPD active smoker on Home O2 PRN    1. Community Acquired Pneumonia (likely gram positive vs atypical bacteria)  - Completed antibiotics on 2/16  - ID and Pulm input appreciated  - Repeat CT Chest (non contrast) in 6-8 weeks to assess for resolution    2. COPD exacerbation   - Continue Steroids and Nebs  - Finished Zithromax on 2/16  - Pulmonary input appreciated    3. Severe AS  - TAVR eval as an outpatient  - Cardiology input appreciated    4. Elevated BNP  - likely a component of acute on chronic HFpEF  - continue Lasix 20 mg daily  - continue Atenolol 25 mg daily    5. CAD / HLD / HTN  - Aspirin, Simvastatin and Atenolol    6. Hypernatremia  - Resolved    DVT Prophylaxis -- Lovenox 40 mg    Dispo: Pending PT

## 2019-02-18 NOTE — PROGRESS NOTE ADULT - SUBJECTIVE AND OBJECTIVE BOX
[facility]  INFECTIOUS DISEASES AND INTERNAL MEDICINE at [place]  =======================================================  Akbar Gonzalez MD  Diplomates American Board of Internal Medicine and Infectious Diseases  =======================================================    EL SHER [id number]    Follow up: Pneumonia    No fever or chills  No complaints     Allergies:  penicillin (Rash)  sulfur hexafluoride (Anaphylaxis; Rash)      Antibiotics:  Completed Azithromycin and Ceftriaxone       REVIEW OF SYSTEMS:  CONSTITUTIONAL:  No Fever or chills  HEENT:   No diplopia or blurred vision.  No earache, sore throat or runny nose.  CARDIOVASCULAR:  No pressure, squeezing, strangling, tightness, heaviness or aching about the chest, neck, axilla or epigastrium.  RESPIRATORY:  + shortness of breath  GASTROINTESTINAL:  No nausea, vomiting or diarrhea.  GENITOURINARY:  No dysuria, frequency or urgency.  MUSCULOSKELETAL:  no joint aches, no muscle pain  SKIN:  No change in skin, hair or nails.  NEUROLOGIC:  No seizures or headaches  PSYCHIATRIC:  No disorder of thought or mood.  ENDOCRINE:  No heat or cold intolerance  HEMATOLOGICAL:  No easy bruising or bleeding.       Physical Exam:   Vital Signs Last 24 Hrs  T(C): 36.9 (18 Feb 2019 07:34), Max: 36.9 (17 Feb 2019 16:17)  T(F): 98.5 (18 Feb 2019 07:34), Max: 98.5 (18 Feb 2019 07:34)  HR: 66 (18 Feb 2019 09:22) (60 - 83)  BP: 130/68 (18 Feb 2019 07:34) (120/67 - 130/68)  BP(mean): --  RR: 18 (18 Feb 2019 07:34) (18 - 18)  SpO2: 96% (18 Feb 2019 09:22) (94% - 96%)      GEN: NAD, pleasant  HEENT: normocephalic and atraumatic. EOMI. PERRL.  Anicteric   NECK: Supple.   LUNGS: Minimal crackles lower bases. no wheezing  HEART: Regular rate and rhythm   ABDOMEN: Soft, nontender, and nondistended.  Positive bowel sounds.    : No CVA tenderness  EXTREMITIES: Without any edema.  MSK: no joint swelling  NEUROLOGIC: No focal deficits  PSYCHIATRIC: Appropriate affect .  SKIN: No Rash      Labs:      RECENT CULTURES:  02-12 @ 18:20 .Urine     No growth      02-12 @ 13:44 .Blood     No growth at 48 hours      02-12 @ 13:13    RVP  NotDetec

## 2019-02-18 NOTE — PROGRESS NOTE ADULT - SUBJECTIVE AND OBJECTIVE BOX
[place] [facility] - [facility], THE HEART CENTER                                   [address] [place] [address]                                                      PHONE: [phone]                                                         FAX: [phone]  [email or web address]  ---------------------------------------------------------------------------------------------------------------------------------    Overnight events/patient complaints: less dyspneic      penicillin (Rash)  sulfur hexafluoride (Anaphylaxis; Rash)    MEDICATIONS  (STANDING):  ALBUTerol    90 MICROgram(s) HFA Inhaler 1 Puff(s) Inhalation every 4 hours  ALBUTerol/ipratropium for Nebulization 3 milliLiter(s) Nebulizer every 6 hours  aspirin enteric coated 81 milliGRAM(s) Oral daily  ATENolol  Tablet 25 milliGRAM(s) Oral daily  enoxaparin Injectable 40 milliGRAM(s) SubCutaneous every 24 hours  furosemide    Tablet 20 milliGRAM(s) Oral daily  methylPREDNISolone sodium succinate Injectable 40 milliGRAM(s) IV Push every 12 hours  pantoprazole    Tablet 40 milliGRAM(s) Oral before breakfast  saccharomyces boulardii 250 milliGRAM(s) Oral two times a day  sertraline 12.5 milliGRAM(s) Oral at bedtime  simvastatin 20 milliGRAM(s) Oral at bedtime  tiotropium 18 MICROgram(s) Capsule 1 Capsule(s) Inhalation daily    MEDICATIONS  (PRN):  acetaminophen   Tablet .. 650 milliGRAM(s) Oral every 6 hours PRN Temp greater or equal to 38C (100.4F), Mild Pain (1 - 3)      Vital Signs Last 24 Hrs  T(C): 36.9 (18 Feb 2019 07:34), Max: 36.9 (17 Feb 2019 16:17)  T(F): 98.5 (18 Feb 2019 07:34), Max: 98.5 (18 Feb 2019 07:34)  HR: 66 (18 Feb 2019 09:22) (60 - 83)  BP: 130/68 (18 Feb 2019 07:34) (120/67 - 130/68)  BP(mean): --  RR: 18 (18 Feb 2019 07:34) (18 - 18)  SpO2: 96% (18 Feb 2019 09:22) (94% - 97%)  Daily     Daily   ICU Vital Signs Last 24 Hrs  EL MORINRON  I&O's Detail    I&O's Summary    Drug Dosing Weight  EL NIKKY      PHYSICAL EXAM:  General: Appears well developed, well nourished alert and cooperative.  HEENT: Head; normocephalic, atraumatic.  Eyes: Pupils reactive, cornea wnl.  Neck: Supple, no nodes adenopathy, no NVD or carotid bruit or thyromegaly.  CARDIOVASCULAR: Normal S1 absent S2 3/6 systolic murmur at base  LUNGS: No rales, rhonchi or wheeze. Normal breath sounds bilaterally.  ABDOMEN: Soft, nontender without mass or organomegaly. bowel sounds normoactive.  EXTREMITIES: No clubbing, cyanosis or edema. Distal pulses wnl.   SKIN: warm and dry with normal turgor.  NEURO: Alert/oriented x 3/normal motor exam. No pathologic reflexes.    PSYCH: normal affect.        LABS:          EL NIKKY            RADIOLOGY & ADDITIONAL STUDIES:    INTERPRETATION OF TELEMETRY (personally reviewed):    ECG:  < from: 12 Lead ECG (02.12.19 @ 12:42) >    Diagnosis Line Normal sinus rhythm  Rightward axis  ST & T wave abnormality, consider inferior ischemia  ST & T wave abnormality, consider anterolateral ischemia  Abnormal ECG    Confirmed by Baldemar Gabriel ([id number]) on 2/12/2019 1:51:53 PM    < end of copied text >    ECHO:  < from: TTE Echo Complete w/Doppler (02.12.19 @ 16:50) >    Summary:   1. Normal global left ventricular systolic function.   2. Spectral Doppler shows impaired relaxation pattern of left   ventricular myocardial filling (Grade I diastolic dysfunction).   3. There is moderate concentric left ventricular hypertrophy.   4. Degenerative mitral valve.   5. Thickening and calcification of the anterior and posterior mitral   valve leaflets.   6. Mild-moderate tricuspid regurgitation.   7. Pulmonic valve regurgitation.   8. Estimated pulmonary artery systolic pressure is 50.9 mmHg assuming a   right atrial pressure of 3 mmHg, which is consistent with moderate   pulmonary hypertension.   9. Peak transaortic gradient is 41.3 mmHg, mean transaortic gradient   equals 21.3 mmHg, the calculated aortic valve area equals 0.95 cm² by the   continuity equation consistent with severe aortic stenosis.  10. There is moderate aortic root calcification.  11. Incidental findings of right kidney and liver cysts.  12. Left ventricular ejection fraction, by visual estimation, is 70 to   75%.  13. Normal left ventricular internal cavity size.  14. Moderately increased LV wall thickness.    [id number] Raymond Martinez MD, Electronically signed on 2/13/2019 at 10:28:24   AM              *** Final ***                  RAYMOND MARTINEZ M.D.  This document has been electronically signed. Feb 12 2019  4:50PM          < end of copied text >    STRESS TEST:    CARDIAC CATHETERIZATION:< from: Cardiac Cath Lab - Adult (04.09.18 @ 16:04) >  VENTRICLES: EF >70% w/ small cavity and severe LVH.  CORONARY VESSELS: R dominant.  Moderate sized RCA w/ mild disease.  Large LCx w/ moderate 50% proximal stenosis and large distal vessel.  Large LAD w/ mild diffuse disease.  COMPLICATIONS: No complications occurred during the cath lab visit.  DIAGNOSTIC IMPRESSIONS: Severe pulmonary HTN w/ PCW 20's.  Hyperdynamic LV w/ LVH.  No sig AS.  Moderate LCx and mild LAD disease.  DIAGNOSTIC RECOMMENDATIONS: Medical therapy.  Followup 2-4 weeks at [facility].  INTERVENTIONAL IMPRESSIONS: Severe pulmonary HTN w/ PCW 20's.  Hyperdynamic LV w/ LVH.  No sig A    < end of copied text >

## 2019-02-19 ENCOUNTER — INBOUND DOCUMENT (OUTPATIENT)
Age: 84
End: 2019-02-19

## 2019-02-19 ENCOUNTER — TRANSCRIPTION ENCOUNTER (OUTPATIENT)
Age: 84
End: 2019-02-19

## 2019-02-19 VITALS
DIASTOLIC BLOOD PRESSURE: 62 MMHG | SYSTOLIC BLOOD PRESSURE: 108 MMHG | OXYGEN SATURATION: 93 % | RESPIRATION RATE: 18 BRPM | TEMPERATURE: 98 F | HEART RATE: 75 BPM

## 2019-02-19 PROCEDURE — 99239 HOSP IP/OBS DSCHRG MGMT >30: CPT

## 2019-02-19 PROCEDURE — 93306 TTE W/DOPPLER COMPLETE: CPT

## 2019-02-19 PROCEDURE — 94640 AIRWAY INHALATION TREATMENT: CPT

## 2019-02-19 PROCEDURE — 71275 CT ANGIOGRAPHY CHEST: CPT

## 2019-02-19 PROCEDURE — 71045 X-RAY EXAM CHEST 1 VIEW: CPT

## 2019-02-19 PROCEDURE — 81001 URINALYSIS AUTO W/SCOPE: CPT

## 2019-02-19 PROCEDURE — 87633 RESP VIRUS 12-25 TARGETS: CPT

## 2019-02-19 PROCEDURE — 87486 CHLMYD PNEUM DNA AMP PROBE: CPT

## 2019-02-19 PROCEDURE — 86431 RHEUMATOID FACTOR QUANT: CPT

## 2019-02-19 PROCEDURE — 96375 TX/PRO/DX INJ NEW DRUG ADDON: CPT

## 2019-02-19 PROCEDURE — 97163 PT EVAL HIGH COMPLEX 45 MIN: CPT

## 2019-02-19 PROCEDURE — 83880 ASSAY OF NATRIURETIC PEPTIDE: CPT

## 2019-02-19 PROCEDURE — 84484 ASSAY OF TROPONIN QUANT: CPT

## 2019-02-19 PROCEDURE — 94760 N-INVAS EAR/PLS OXIMETRY 1: CPT

## 2019-02-19 PROCEDURE — 85610 PROTHROMBIN TIME: CPT

## 2019-02-19 PROCEDURE — 87040 BLOOD CULTURE FOR BACTERIA: CPT

## 2019-02-19 PROCEDURE — 87086 URINE CULTURE/COLONY COUNT: CPT

## 2019-02-19 PROCEDURE — 87449 NOS EACH ORGANISM AG IA: CPT

## 2019-02-19 PROCEDURE — 85027 COMPLETE CBC AUTOMATED: CPT

## 2019-02-19 PROCEDURE — 85730 THROMBOPLASTIN TIME PARTIAL: CPT

## 2019-02-19 PROCEDURE — 99285 EMERGENCY DEPT VISIT HI MDM: CPT | Mod: 25

## 2019-02-19 PROCEDURE — 80048 BASIC METABOLIC PNL TOTAL CA: CPT

## 2019-02-19 PROCEDURE — 87798 DETECT AGENT NOS DNA AMP: CPT

## 2019-02-19 PROCEDURE — 80053 COMPREHEN METABOLIC PANEL: CPT

## 2019-02-19 PROCEDURE — 85652 RBC SED RATE AUTOMATED: CPT

## 2019-02-19 PROCEDURE — 83690 ASSAY OF LIPASE: CPT

## 2019-02-19 PROCEDURE — 83735 ASSAY OF MAGNESIUM: CPT

## 2019-02-19 PROCEDURE — 96365 THER/PROPH/DIAG IV INF INIT: CPT | Mod: XU

## 2019-02-19 PROCEDURE — 86038 ANTINUCLEAR ANTIBODIES: CPT

## 2019-02-19 PROCEDURE — 84145 PROCALCITONIN (PCT): CPT

## 2019-02-19 PROCEDURE — 93005 ELECTROCARDIOGRAM TRACING: CPT

## 2019-02-19 PROCEDURE — 86235 NUCLEAR ANTIGEN ANTIBODY: CPT

## 2019-02-19 PROCEDURE — 86036 ANCA SCREEN EACH ANTIBODY: CPT

## 2019-02-19 PROCEDURE — 36415 COLL VENOUS BLD VENIPUNCTURE: CPT

## 2019-02-19 PROCEDURE — 83605 ASSAY OF LACTIC ACID: CPT

## 2019-02-19 PROCEDURE — 86140 C-REACTIVE PROTEIN: CPT

## 2019-02-19 PROCEDURE — 87581 M.PNEUMON DNA AMP PROBE: CPT

## 2019-02-19 RX ORDER — PANTOPRAZOLE SODIUM 20 MG/1
1 TABLET, DELAYED RELEASE ORAL
Qty: 5 | Refills: 0 | OUTPATIENT
Start: 2019-02-19 | End: 2019-02-23

## 2019-02-19 RX ORDER — ACETAMINOPHEN 500 MG
2 TABLET ORAL
Qty: 0 | Refills: 0 | DISCHARGE
Start: 2019-02-19

## 2019-02-19 RX ORDER — FUROSEMIDE 40 MG
1 TABLET ORAL
Qty: 30 | Refills: 0
Start: 2019-02-19 | End: 2019-03-20

## 2019-02-19 RX ADMIN — ATENOLOL 25 MILLIGRAM(S): 25 TABLET ORAL at 05:21

## 2019-02-19 RX ADMIN — Medication 40 MILLIGRAM(S): at 05:31

## 2019-02-19 RX ADMIN — Medication 250 MILLIGRAM(S): at 05:21

## 2019-02-19 RX ADMIN — Medication 3 MILLILITER(S): at 16:45

## 2019-02-19 RX ADMIN — ENOXAPARIN SODIUM 40 MILLIGRAM(S): 100 INJECTION SUBCUTANEOUS at 05:21

## 2019-02-19 RX ADMIN — Medication 650 MILLIGRAM(S): at 16:17

## 2019-02-19 RX ADMIN — PANTOPRAZOLE SODIUM 40 MILLIGRAM(S): 20 TABLET, DELAYED RELEASE ORAL at 05:22

## 2019-02-19 RX ADMIN — Medication 40 MILLIGRAM(S): at 16:11

## 2019-02-19 NOTE — DISCHARGE NOTE ADULT - CARE PROVIDERS DIRECT ADDRESSES
,DirectAddress_Unknown,DirectAddress_Unknown,[email or web address]

## 2019-02-19 NOTE — PROGRESS NOTE ADULT - PROVIDER SPECIALTY LIST ADULT
Cardiology
Hospitalist
Hospitalist
Infectious Disease
Internal Medicine
Pulmonology
Infectious Disease
Hospitalist

## 2019-02-19 NOTE — PROGRESS NOTE ADULT - SUBJECTIVE AND OBJECTIVE BOX
[facility] - [facility], THE HEART CENTER                                   [address] [place] [address]                                                      PHONE: [phone]                                                         FAX: [phone]  [email or web address]  ---------------------------------------------------------------------------------------------------------------------------------    Overnight events/patient complaints:  Pt feels better breathing improved    penicillin (Rash)  sulfur hexafluoride (Anaphylaxis; Rash)    MEDICATIONS  (STANDING):  ALBUTerol    90 MICROgram(s) HFA Inhaler 1 Puff(s) Inhalation every 4 hours  ALBUTerol/ipratropium for Nebulization 3 milliLiter(s) Nebulizer every 6 hours  aspirin enteric coated 81 milliGRAM(s) Oral daily  ATENolol  Tablet 25 milliGRAM(s) Oral daily  enoxaparin Injectable 40 milliGRAM(s) SubCutaneous every 24 hours  furosemide    Tablet 40 milliGRAM(s) Oral daily  pantoprazole    Tablet 40 milliGRAM(s) Oral before breakfast  predniSONE   Tablet 40 milliGRAM(s) Oral once  saccharomyces boulardii 250 milliGRAM(s) Oral two times a day  sertraline 12.5 milliGRAM(s) Oral at bedtime  simvastatin 20 milliGRAM(s) Oral at bedtime  tiotropium 18 MICROgram(s) Capsule 1 Capsule(s) Inhalation daily    MEDICATIONS  (PRN):  acetaminophen   Tablet .. 650 milliGRAM(s) Oral every 6 hours PRN Temp greater or equal to 38C (100.4F), Mild Pain (1 - 3)      Vital Signs Last 24 Hrs  T(C): 36.7 (18 Feb 2019 23:26), Max: 37 (18 Feb 2019 17:08)  T(F): 98.1 (18 Feb 2019 23:26), Max: 98.6 (18 Feb 2019 17:08)  HR: 70 (19 Feb 2019 05:19) (66 - 79)  BP: 121/62 (19 Feb 2019 05:19) (110/57 - 158/53)  BP(mean): --  RR: 18 (18 Feb 2019 23:26) (18 - 18)  SpO2: 93% (18 Feb 2019 23:00) (93% - 96%)  ICU Vital Signs Last 24 Hrs  EL SHER  I&O's Detail    Drug Dosing Weight  ELJHON SHER      PHYSICAL EXAM:  General: Appears well developed, well nourished alert and cooperative.  HEENT: Head; normocephalic, atraumatic.  Eyes: Pupils reactive, cornea wnl.  Neck: Supple, no nodes adenopathy, no NVD or carotid bruit or thyromegaly.  CARDIOVASCULAR: 2/6 systolic murmur, rub, gallop or lift.   LUNGS: No rales, rhonchi or wheeze. Normal breath sounds bilaterally.  ABDOMEN: Soft, nontender without mass or organomegaly. bowel sounds normoactive.  EXTREMITIES: No clubbing, cyanosis or edema. Distal pulses wnl.   SKIN: warm and dry with normal turgor.  NEURO: Alert/oriented x 3/normal motor exam. No pathologic reflexes.    PSYCH: normal affect.        LABS:          EL NIKKY            RADIOLOGY & ADDITIONAL STUDIES:    INTERPRETATION OF TELEMETRY (personally reviewed): no events     ECHO: < from: TTE Echo Complete w/Doppler (02.12.19 @ 16:50) >    Summary:   1. Normal global left ventricular systolic function.   2. Spectral Doppler shows impaired relaxation pattern of left   ventricular myocardial filling (Grade I diastolic dysfunction).   3. There is moderate concentric left ventricular hypertrophy.   4. Degenerative mitral valve.   5. Thickening and calcification of the anterior and posterior mitral   valve leaflets.   6. Mild-moderate tricuspid regurgitation.   7. Pulmonic valve regurgitation.   8. Estimated pulmonary artery systolic pressure is 50.9 mmHg assuming a   right atrial pressure of 3 mmHg, which is consistent with moderate   pulmonary hypertension.   9. Peak transaortic gradient is 41.3 mmHg, mean transaortic gradient   equals 21.3 mmHg, the calculated aortic valve area equals 0.95 cm² by the   continuity equation consistent with severe aortic stenosis.  10. There is moderate aortic root calcification.  11. Incidental findings of right kidney and liver cysts.  12. Left ventricular ejection fraction, by visual estimation, is 70 to   75%.  13. Normal left ventricular internal cavity size.  14. Moderately increased LV wall thickness.    [id number] Greg Martinez MD, Electronically signed on 2/13/2019 at 10:28:24   AM    < end of copied text >           CARDIAC CATHETERIZATION: < from: Cardiac Cath Lab - Adult (04.09.18 @ 16:04) >  VENTRICLES: EF >70% w/ small cavity and severe LVH.  CORONARY VESSELS: R dominant.  Moderate sized RCA w/ mild disease.  Large LCx w/ moderate 50% proximal stenosis and large distal vessel.  Large LAD w/ mild diffuse disease.  COMPLICATIONS: No complications occurred during the cath lab visit.  DIAGNOSTIC IMPRESSIONS: Severe pulmonary HTN w/ PCW 20's.  Hyperdynamic LV w/ LVH.  No sig AS.  Moderate LCx and mild LAD disease.  DIAGNOSTIC RECOMMENDATIONS: Medical therapy.  Followup 2-4 weeks at [facility].  INTERVENTIONAL IMPRESSIONS: Severe pulmonary HTN w/ PCW 20's.  Hyperdynamic LV w/ LVH.  No sig AS.  Moderate LCx and mild LAD disease.  INTERVENTIONAL RECOMMENDATIONS: Medical therapy.  Followup 2-4 weeks at [facility].  Prepared and signed by  Stefano Ferraro MD  Signed 04/13/2018 19:08:47    < end of copied text >      ASSESSMENT AND PLAN:  In summary, EL SHER is an 86y Female with past medical history significant for nonobstructive CAD on cath 2018, HLD, past tobacco use, COPD on 02, aw PNA found to have severe AS.    1) Continue with treatment for PNA as per ID  2) Will arrange outpatient JAIRO and cath in a few weeks  3) No further [facility] workup at this time

## 2019-02-19 NOTE — DISCHARGE NOTE ADULT - PLAN OF CARE
Complete resolution Finished antibiotics.  Follow up with PMD in 1 week. Continue medications as prescribed.  Follow up with PMD in 1 week.  Follow up with Dr. Mejia in 1 week. Follow up with Cardio in 1 week for outpatient work up (including JAIRO and Cath) for TAVR. Continue home medications.  Follow up with PMD in 1 week.

## 2019-02-19 NOTE — DISCHARGE NOTE ADULT - MEDICATION SUMMARY - MEDICATIONS TO TAKE
I will START or STAY ON the medications listed below when I get home from the hospital:    viteye  -- 1 tab(s) by mouth once a day  -- Indication: For Eye Vitamin    Rolling Walker  -- Diagnosis: Ambulatory Dysfunction  ICD: R26.2  -- Indication: For Ambulatory Dysfunction    predniSONE 10 mg oral tablet  -- 3 tabs on 2/20  2 tabs on 2/21  1 tab on 2/22  -- Indication: For COPD    acetaminophen 325 mg oral tablet  -- 2 tab(s) by mouth every 6 hours, As needed, Temp greater or equal to 38C (100.4F), Mild Pain (1 - 3)  -- Indication: For Pain or Fever    aspirin 81 mg oral tablet  -- 1 tab(s) by mouth once a day  -- Indication: For CAD    sertraline 25 mg oral tablet  -- 1 tab(s) by mouth once a day  -- Indication: For Home medication    simvastatin 20 mg oral tablet  -- 1 tab(s) by mouth once a day (at bedtime)  -- Indication: For CAD/HLD    atenolol 25 mg oral tablet  -- 1 tab(s) by mouth once a day  -- Indication: For CAD/HTN    Anoro Ellipta 62.5 mcg-25 mcg inhalation powder  -- 1 puff(s) inhaled once a day  -- Indication: For COPD    albuterol 0.63 mg/3 mL (0.021%) inhalation solution  --  inhaled , As Needed  -- Indication: For COPD    furosemide 40 mg oral tablet  -- 1 tab(s) by mouth once a day  -- Indication: For CHF    pantoprazole 40 mg oral delayed release tablet  -- 1 tab(s) by mouth once a day (before a meal)  -- Indication: For GI Prophylaxis while on steroids I will START or STAY ON the medications listed below when I get home from the hospital:    viteye  -- 1 tab(s) by mouth once a day  -- Indication: For Eye Vitamin    Rolling Walker  -- Diagnosis: Ambulatory Dysfunction  ICD: R26.2  -- Indication: For Ambulatory Dysfunction    predniSONE 10 mg oral tablet  -- 3 tabs on 2/20  2 tabs on 2/21  1 tab on 2/22  -- Indication: For COPD Exacerbation    acetaminophen 325 mg oral tablet  -- 2 tab(s) by mouth every 6 hours, As needed, Temp greater or equal to 38C (100.4F), Mild Pain (1 - 3)  -- Indication: For Pain or Fever    aspirin 81 mg oral tablet  -- 1 tab(s) by mouth once a day  -- Indication: For CAD    sertraline 25 mg oral tablet  -- 1 tab(s) by mouth once a day  -- Indication: For Home medication    simvastatin 20 mg oral tablet  -- 1 tab(s) by mouth once a day (at bedtime)  -- Indication: For CAD/HLD    atenolol 25 mg oral tablet  -- 1 tab(s) by mouth once a day  -- Indication: For CAD/HTN    Anoro Ellipta 62.5 mcg-25 mcg inhalation powder  -- 1 puff(s) inhaled once a day  -- Indication: For COPD    albuterol 0.63 mg/3 mL (0.021%) inhalation solution  --  inhaled , As Needed  -- Indication: For COPD    furosemide 40 mg oral tablet  -- 1 tab(s) by mouth once a day  -- Indication: For CHF    pantoprazole 40 mg oral delayed release tablet  -- 1 tab(s) by mouth once a day (before a meal)  -- Indication: For GI Prophylaxis while on steroids

## 2019-02-19 NOTE — DISCHARGE NOTE ADULT - PROVIDER TOKENS
PROVIDER:[TOKEN:[[id number]]],PROVIDER:[TOKEN:[[id number]]],PROVIDER:[TOKEN:[[id number]]]

## 2019-02-19 NOTE — DISCHARGE NOTE ADULT - HOSPITAL COURSE
Patient was admitted with Community Acquired Pneumonia and COPD Exacerbation. She was started on Antibiotics, Steroids and Nebs and monitored closely. She was followed by Pulmonary, Cardio and ID during hospitalization. Her symptoms improved and she is feeling much better. She has Sever AS and she will follow with Cardio for outpatient work up (including JAIRO and Cath) for TAVR. She is stable for discharge. Patient has oxygen at home.     Vital Signs   T(C): 36.7 (19 Feb 2019 12:06), Max: 37 (18 Feb 2019 17:08)  T(F): 98.1 (19 Feb 2019 12:06), Max: 98.6 (18 Feb 2019 17:08)  HR: 88 (19 Feb 2019 12:06) (70 - 88)  BP: 104/65 (19 Feb 2019 12:06) (104/65 - 158/53)  RR: 20 (19 Feb 2019 12:06) (18 - 20)  SpO2: 96% (19 Feb 2019 12:06) (93% - 96%)  General: Elderly female lying in bed comfortably. No acute distress.  HEENT: PERRLA. EOMI. Clear conjunctivae. Dry mucus membrane. Hard of hearing.   Neck: Supple. No JVD.   Chest: Good air entry. Fine crackles at bases. No wheezing or rhonchi. No chest wall tenderness.  Heart: Normal S1 & S2. RRR.   Abdomen: Soft. Non-tender. Non-distended. + BS  Ext: No pedal edema. No calf tenderness   Neuro: Active and alert. No focal deficit. No speech disorder  Skin: Warm and Dry  Psychiatry: Normal mood and affect    Time spent: 42 minutes

## 2019-02-19 NOTE — DISCHARGE NOTE ADULT - CARE PLAN
Principal Discharge DX:	CAP (community acquired pneumonia)  Goal:	Complete resolution  Assessment and plan of treatment:	Finished antibiotics.  Follow up with PMD in 1 week.  Secondary Diagnosis:	COPD exacerbation  Assessment and plan of treatment:	Continue medications as prescribed.  Follow up with PMD in 1 week.  Follow up with Dr. Mejia in 1 week.  Secondary Diagnosis:	Aortic stenosis  Assessment and plan of treatment:	Follow up with Cardio in 1 week for outpatient work up (including JAIRO and Cath) for TAVR.  Secondary Diagnosis:	HTN (hypertension)  Assessment and plan of treatment:	Continue home medications.  Follow up with PMD in 1 week.  Secondary Diagnosis:	HLD (hyperlipidemia)  Assessment and plan of treatment:	Continue home medications.  Follow up with PMD in 1 week.

## 2019-02-19 NOTE — DISCHARGE NOTE ADULT - PATIENT PORTAL LINK FT
You can access the [facility] Patient Portal, offered by , by registering with the following website: [email or web address]

## 2019-02-19 NOTE — DISCHARGE NOTE ADULT - CARE PROVIDER_API CALL
Yoni Grover)  Infectious Disease; Internal Medicine  [facility], [address] [place] [address]  Phone: [phone]  Fax: [phone]  Follow Up Time:     David Olea)  Cardiovascular Disease; Internal Medicine  [address] [place] [address]  Phone: [phone]  Fax: [phone]  Follow Up Time:     Oziel Mejia ()  Critical Care Medicine; Internal Medicine; Pulmonary Disease  [address] [facility], [address]  [place] [address]  Phone: [phone]  Fax: [phone]  Follow Up Time:

## 2019-02-19 NOTE — DISCHARGE NOTE ADULT - ADDITIONAL INSTRUCTIONS
Follow up with PMD in 1 week.  Follow up with Pulmonary in 1 week.  Follow up with Cardio in 1 week for outpatient work up (including JAIRO and Cath) for TAVR.

## 2019-02-20 ENCOUNTER — MEDICATION RENEWAL (OUTPATIENT)
Age: 84
End: 2019-02-20

## 2019-02-26 ENCOUNTER — APPOINTMENT (OUTPATIENT)
Dept: PULMONOLOGY | Facility: CLINIC | Age: 84
End: 2019-02-26
Payer: MEDICARE

## 2019-02-26 VITALS
SYSTOLIC BLOOD PRESSURE: 112 MMHG | DIASTOLIC BLOOD PRESSURE: 60 MMHG | WEIGHT: 116 LBS | HEART RATE: 64 BPM | OXYGEN SATURATION: 96 % | BODY MASS INDEX: 21.22 KG/M2

## 2019-02-26 DIAGNOSIS — Z01.811 ENCOUNTER FOR PREPROCEDURAL RESPIRATORY EXAMINATION: ICD-10-CM

## 2019-02-26 PROCEDURE — 99496 TRANSJ CARE MGMT HIGH F2F 7D: CPT

## 2019-02-26 RX ORDER — RANITIDINE HYDROCHLORIDE 150 MG/1
150 CAPSULE ORAL
Refills: 0 | Status: DISCONTINUED | COMMUNITY
Start: 2018-09-26 | End: 2019-02-26

## 2019-02-26 RX ORDER — PANTOPRAZOLE 40 MG/1
40 TABLET, DELAYED RELEASE ORAL
Qty: 90 | Refills: 1 | Status: DISCONTINUED | COMMUNITY
Start: 2019-02-20 | End: 2019-02-26

## 2019-02-26 RX ORDER — FUROSEMIDE 20 MG/1
20 TABLET ORAL
Qty: 15 | Refills: 0 | Status: DISCONTINUED | COMMUNITY
Start: 2018-12-21

## 2019-02-26 NOTE — DISCUSSION/SUMMARY
[FreeTextEntry1] : COPD Gold class III -IV, Nicotine addiction\par post hospitalization for COPD, "pneumonia", CT scan more c/w with atypical CHF to my review\par bronchial wall thickening and subcarinal node also noted, will follow\par Significantly elevated Pro-BNP and essentially normal procalcitonin were consistent\par finished abx and prednisone\par echocardiogram with severe AS, TAVR planned\par clinically improved, exam without bronchospasm, \par compliance with Anoro daily, duo neb bid and prn\par Smoking cessation \par  await comparison CT scan for TAVR work up\par No pulmonary contraindications to procedure\par At increased risk of post operative pulmonary complications, risk/benefit favors, discussed with pt and daughter, given poor prognosis of untreated symptomatic AS\par prednisone 20 mg daily 3 days before and am of procedure\par duo neb prior to induction and q 4 hrs\par incentive spirometry and DVT prophylaxis\par monitored bed, continuous sp02\par over all prognosis guarded\par

## 2019-02-26 NOTE — REASON FOR VISIT
[Abnormal CT Scan] : abnormal CT Scan [Shortness of Breath] : shortness of Breath [COPD] : COPD [Follow-Up - From Hospitalization] : a hospitalization follow-up [FreeTextEntry2] : pneumonia

## 2019-02-26 NOTE — HISTORY OF PRESENT ILLNESS
[FreeTextEntry1] : \par Post recent hospitalization at [facility]\par February 12 to  February 19\par Treated for COPD, CHF, newly diagnosed severe aortic stenosis, questionable pneumonia\par Currently feels significantly better\par Dyspnea is improved\par Cough is improved\par No fever chills or chest pain\par Sputum slightly discolored\par She is finished abx and prednisone\par TAVR evaluation in progress\par using 02 continuously, on diuretics

## 2019-02-26 NOTE — CONSULT LETTER
[Dear  ___] : Dear  [unfilled], [Consult Letter:] : I had the pleasure of evaluating your patient, [unfilled]. [Please see my note below.] : Please see my note below. [Consult Closing:] : Thank you very much for allowing me to participate in the care of this patient.  If you have any questions, please do not hesitate to contact me. [Sincerely,] : Sincerely, [DrBecky  ___] : Dr. DENISE [[facility], [facility]] : [facility] DO, [facility] [Director, Respiratory Care] : Director, Respiratory Care [[facility]] : [facility] [FreeTextEntry3] : [facility] DO [facility]\par Pulmonary Critical Care\par Director Pulmonary Division\par Medical Director Respiratory Therapy\par [facility]\par \par

## 2019-02-27 DIAGNOSIS — R53.81 OTHER MALAISE: ICD-10-CM

## 2019-02-28 ENCOUNTER — APPOINTMENT (OUTPATIENT)
Dept: INTERNAL MEDICINE | Facility: CLINIC | Age: 84
End: 2019-02-28

## 2019-04-09 ENCOUNTER — APPOINTMENT (OUTPATIENT)
Dept: INTERNAL MEDICINE | Facility: CLINIC | Age: 84
End: 2019-04-09
Payer: MEDICARE

## 2019-04-09 VITALS
BODY MASS INDEX: 21.35 KG/M2 | SYSTOLIC BLOOD PRESSURE: 130 MMHG | DIASTOLIC BLOOD PRESSURE: 70 MMHG | HEIGHT: 62 IN | WEIGHT: 116 LBS

## 2019-04-09 PROCEDURE — 99358 PROLONG SERVICE W/O CONTACT: CPT

## 2019-04-09 PROCEDURE — 99214 OFFICE O/P EST MOD 30 MIN: CPT | Mod: 25

## 2019-04-09 RX ORDER — SERTRALINE 25 MG/1
25 TABLET, FILM COATED ORAL
Refills: 0 | Status: DISCONTINUED | COMMUNITY
End: 2019-04-09

## 2019-04-09 NOTE — PHYSICAL EXAM
[Well Nourished] : well nourished [No Acute Distress] : no acute distress [Well-Appearing] : well-appearing [Well Developed] : well developed [Normal Sclera/Conjunctiva] : normal sclera/conjunctiva [EOMI] : extraocular movements intact [PERRL] : pupils equal round and reactive to light [No JVD] : no jugular venous distention [Normal Oropharynx] : the oropharynx was normal [Normal Outer Ear/Nose] : the outer ears and nose were normal in appearance [No Lymphadenopathy] : no lymphadenopathy [Supple] : supple [Thyroid Normal, No Nodules] : the thyroid was normal and there were no nodules present [No Respiratory Distress] : no respiratory distress  [No Accessory Muscle Use] : no accessory muscle use [Normal Rate] : normal rate  [Regular Rhythm] : with a regular rhythm [Normal S1, S2] : normal S1 and S2 [No Carotid Bruits] : no carotid bruits [No Murmur] : no murmur heard [No Varicosities] : no varicosities [No Abdominal Bruit] : a ~M bruit was not heard ~T in the abdomen [Pedal Pulses Present] : the pedal pulses are present [No Edema] : there was no peripheral edema [Non Tender] : non-tender [Soft] : abdomen soft [No Extremity Clubbing/Cyanosis] : no extremity clubbing/cyanosis [No Skin Lesions] : no skin lesions [No Rash] : no rash [Non-distended] : non-distended [Normal Gait] : normal gait [Coordination Grossly Intact] : coordination grossly intact

## 2019-04-09 NOTE — HISTORY OF PRESENT ILLNESS
[FreeTextEntry1] : follow up from surgery  [de-identified] : Ms. EL SHER is a 85 year female with a PMH of HTN, COPD on Home [id number] to the office  for follow up from discharge from hospital. On 3/27/19 patient had TAVR procedure. Patient was in the hospital for 1 week, no complications during hospital stay. Patient feels well and has no complaints at this time

## 2019-04-09 NOTE — PLAN
[FreeTextEntry1] : Prior to patient's arrival, extensive medical records were reviewed including but not limited to, Hospital records records, out patient records, laboratory data and microbiology data  In addition extensive time was also spent in reviewing diagnostic studies.  The duration of the review took 35 minutes   Patient will continue to follow up with cardiologist  Patient will continue to follow up with Pulmonologist.   Counseling included abnormal lab results, differential diagnoses, treatment options, risks and benefits, lifestyle changes, prognosis, current condition, medications, and dose adjustments.  The patient was interactive, attentive, asked questions, and verbalized understanding

## 2019-05-15 ENCOUNTER — APPOINTMENT (OUTPATIENT)
Dept: PULMONOLOGY | Facility: CLINIC | Age: 84
End: 2019-05-15
Payer: MEDICARE

## 2019-05-15 VITALS — SYSTOLIC BLOOD PRESSURE: 138 MMHG | HEART RATE: 74 BPM | DIASTOLIC BLOOD PRESSURE: 80 MMHG | OXYGEN SATURATION: 93 %

## 2019-05-15 VITALS — BODY MASS INDEX: 21.58 KG/M2 | WEIGHT: 118 LBS

## 2019-05-15 PROCEDURE — 99215 OFFICE O/P EST HI 40 MIN: CPT | Mod: 25

## 2019-05-15 PROCEDURE — 94010 BREATHING CAPACITY TEST: CPT

## 2019-05-15 RX ORDER — PREDNISONE 10 MG/1
10 TABLET ORAL
Qty: 20 | Refills: 0 | Status: DISCONTINUED | COMMUNITY
Start: 2019-02-26 | End: 2019-05-15

## 2019-05-15 RX ORDER — ELECTROLYTES/DEXTROSE
10 SOLUTION, ORAL ORAL AT BEDTIME
Qty: 90 | Refills: 0 | Status: DISCONTINUED | COMMUNITY
Start: 2019-01-22 | End: 2019-05-15

## 2019-05-15 NOTE — CONSULT LETTER
[Dear  ___] : Dear  [unfilled], [Consult Letter:] : I had the pleasure of evaluating your patient, [unfilled]. [Please see my note below.] : Please see my note below. [Consult Closing:] : Thank you very much for allowing me to participate in the care of this patient.  If you have any questions, please do not hesitate to contact me. [Sincerely,] : Sincerely, [DrBecky  ___] : Dr. DENISE [facility]] : [facility] [Director, Respiratory Care] : Director, Respiratory Care [[facility]] : [facility] [FreeTextEntry3] : [facility]\par Pulmonary Critical Care\par Director Pulmonary Division\par Medical Director Respiratory Therapy\par [facility]\par \par

## 2019-05-15 NOTE — DISCUSSION/SUMMARY
[FreeTextEntry1] : COPD Gold class III -IV, Post TAVR, smoke free\par close to baseline\par good and bad days\par echocardiogram post TAVR stable per pt\par lung exam without bronchospasm, adequate 02 on cannula\par compliance with Anoro daily, duo neb bid and prn\par continued Smoking cessation \par Needs repeat CT scan\par discussed Pulmonary rehabilitation\par spirometry stable\par 8 weeks or sooner if needed\par \par

## 2019-05-15 NOTE — HISTORY OF PRESENT ILLNESS
[FreeTextEntry1] : post TAVR GSH\par good and bad days\par no fever, chill, chest pain\par no purulent sputum

## 2019-05-15 NOTE — REASON FOR VISIT
[Follow-Up - From Hospitalization] : a hospitalization follow-up [Abnormal CT Scan] : abnormal CT Scan [Shortness of Breath] : shortness of Breath [COPD] : COPD [FreeTextEntry2] : pneumonia

## 2019-05-16 ENCOUNTER — FORM ENCOUNTER (OUTPATIENT)
Age: 84
End: 2019-05-16

## 2019-05-17 ENCOUNTER — OUTPATIENT (OUTPATIENT)
Dept: OUTPATIENT SERVICES | Facility: HOSPITAL | Age: 84
LOS: 1 days | End: 2019-05-17
Payer: MEDICARE

## 2019-05-17 ENCOUNTER — APPOINTMENT (OUTPATIENT)
Dept: CT IMAGING | Facility: CLINIC | Age: 84
End: 2019-05-17
Payer: MEDICARE

## 2019-05-17 DIAGNOSIS — K76.89 OTHER SPECIFIED DISEASES OF LIVER: Chronic | ICD-10-CM

## 2019-05-17 DIAGNOSIS — Z96.619 PRESENCE OF UNSPECIFIED ARTIFICIAL SHOULDER JOINT: Chronic | ICD-10-CM

## 2019-05-17 DIAGNOSIS — Z98.890 OTHER SPECIFIED POSTPROCEDURAL STATES: Chronic | ICD-10-CM

## 2019-05-17 DIAGNOSIS — R91.8 OTHER NONSPECIFIC ABNORMAL FINDING OF LUNG FIELD: ICD-10-CM

## 2019-05-17 DIAGNOSIS — J47.9 BRONCHIECTASIS, UNCOMPLICATED: ICD-10-CM

## 2019-05-17 DIAGNOSIS — J44.9 CHRONIC OBSTRUCTIVE PULMONARY DISEASE, UNSPECIFIED: ICD-10-CM

## 2019-05-17 PROCEDURE — 71250 CT THORAX DX C-: CPT

## 2019-05-17 PROCEDURE — 71250 CT THORAX DX C-: CPT | Mod: 26

## 2019-05-30 ENCOUNTER — APPOINTMENT (OUTPATIENT)
Dept: INTERNAL MEDICINE | Facility: CLINIC | Age: 84
End: 2019-05-30
Payer: MEDICARE

## 2019-05-30 ENCOUNTER — APPOINTMENT (OUTPATIENT)
Dept: PULMONOLOGY | Facility: CLINIC | Age: 84
End: 2019-05-30

## 2019-05-30 VITALS
BODY MASS INDEX: 21.71 KG/M2 | WEIGHT: 118 LBS | DIASTOLIC BLOOD PRESSURE: 70 MMHG | SYSTOLIC BLOOD PRESSURE: 138 MMHG | HEIGHT: 62 IN

## 2019-05-30 DIAGNOSIS — Z87.898 PERSONAL HISTORY OF OTHER SPECIFIED CONDITIONS: ICD-10-CM

## 2019-05-30 PROCEDURE — 99358 PROLONG SERVICE W/O CONTACT: CPT

## 2019-05-30 PROCEDURE — 99214 OFFICE O/P EST MOD 30 MIN: CPT | Mod: 25

## 2019-05-30 NOTE — PLAN
[FreeTextEntry1] : Prior to patient's arrival, extensive medical records were reviewed including but not limited to, Hospital records records, out patient records, laboratory data and microbiology data \par In addition extensive time was also spent in reviewing diagnostic studies.\par \par The duration of the review took 35 minutes \par \par In regards to patient's Epistaxis, it has resolved. Patient will follow up with ENT Today \par In regards to patient's COPD, patient will continue to follow up with her Pulmonologist. \par In regards to patient's TAVR patient will follow up with cardiologist. \par \par Counseling included abnormal lab results, differential diagnoses, treatment options, risks and benefits, lifestyle changes, prognosis, current condition, medications, and dose adjustments. \par The patient was interactive, attentive, asked questions, and verbalized understanding

## 2019-05-30 NOTE — HISTORY OF PRESENT ILLNESS
[FreeTextEntry1] : er follow up [de-identified] : Ms. EL SHER is a 85 year female with a PMH of HTN, COPD on Home O2 comes to the office  for follow up from discharge from hospital. On 0528/19 patient was having uncontrollable epistaxis. Patient was in [facility] for 1 Day, it resolved spontaneously. no complications during hospital stay. Patient feels well and has no complaints at this time

## 2019-05-30 NOTE — PHYSICAL EXAM
[No Acute Distress] : no acute distress [Well Nourished] : well nourished [Well Developed] : well developed [Well-Appearing] : well-appearing [Normal Sclera/Conjunctiva] : normal sclera/conjunctiva [PERRL] : pupils equal round and reactive to light [EOMI] : extraocular movements intact [Normal Outer Ear/Nose] : the outer ears and nose were normal in appearance [Normal Oropharynx] : the oropharynx was normal [No JVD] : no jugular venous distention [Supple] : supple [No Lymphadenopathy] : no lymphadenopathy [Thyroid Normal, No Nodules] : the thyroid was normal and there were no nodules present [No Respiratory Distress] : no respiratory distress  [No Accessory Muscle Use] : no accessory muscle use [Normal Rate] : normal rate  [Regular Rhythm] : with a regular rhythm [Normal S1, S2] : normal S1 and S2 [No Murmur] : no murmur heard [No Carotid Bruits] : no carotid bruits [No Abdominal Bruit] : a ~M bruit was not heard ~T in the abdomen [No Varicosities] : no varicosities [Pedal Pulses Present] : the pedal pulses are present [No Edema] : there was no peripheral edema [No Extremity Clubbing/Cyanosis] : no extremity clubbing/cyanosis [Soft] : abdomen soft [Non Tender] : non-tender [Non-distended] : non-distended [No Rash] : no rash [No Skin Lesions] : no skin lesions [Normal Gait] : normal gait [Coordination Grossly Intact] : coordination grossly intact

## 2019-07-22 ENCOUNTER — APPOINTMENT (OUTPATIENT)
Dept: PULMONOLOGY | Facility: CLINIC | Age: 84
End: 2019-07-22
Payer: MEDICARE

## 2019-07-22 VITALS
BODY MASS INDEX: 21.95 KG/M2 | SYSTOLIC BLOOD PRESSURE: 140 MMHG | OXYGEN SATURATION: 88 % | DIASTOLIC BLOOD PRESSURE: 60 MMHG | HEART RATE: 76 BPM | WEIGHT: 120 LBS

## 2019-07-22 VITALS — OXYGEN SATURATION: 95 %

## 2019-07-22 PROCEDURE — 99214 OFFICE O/P EST MOD 30 MIN: CPT

## 2019-07-22 NOTE — PHYSICAL EXAM
[General Appearance - Well Developed] : well developed [General Appearance - Well Nourished] : well nourished [Neck Appearance] : the appearance of the neck was normal [Heart Rate And Rhythm] : heart rate and rhythm were normal [Heart Sounds] : normal S1 and S2 [Respiration, Rhythm And Depth] : normal respiratory rhythm and effort [Exaggerated Use Of Accessory Muscles For Inspiration] : no accessory muscle use [Abdomen Tenderness] : non-tender [] : no rash [No Focal Deficits] : no focal deficits [Oriented To Time, Place, And Person] : oriented to person, place, and time [Impaired Insight] : insight and judgment were intact [Memory Recent] : recent memory was not impaired [Affect] : the affect was normal [FreeTextEntry1] : No chest wall abnormality

## 2019-07-22 NOTE — REASON FOR VISIT
[Abnormal CT Scan] : abnormal CT Scan [Shortness of Breath] : shortness of Breath [COPD] : COPD [Follow-Up] : a follow-up visit [FreeTextEntry2] : pneumonia

## 2019-07-22 NOTE — CONSULT LETTER
[Dear  ___] : Dear  [unfilled], [Consult Letter:] : I had the pleasure of evaluating your patient, [unfilled]. [Please see my note below.] : Please see my note below. [Consult Closing:] : Thank you very much for allowing me to participate in the care of this patient.  If you have any questions, please do not hesitate to contact me. [Sincerely,] : Sincerely, [DrBecky  ___] : Dr. DENISE [[facility] DO, [facility]] : Oz[facility] DO, [facility] [Director, Respiratory Care] : Director, Respiratory Care [[facility]] : [facility] [FreeTextEntry3] : [facility] DO [facility]\par Pulmonary Critical Care\par Director Pulmonary Division\par Medical Director Respiratory Therapy\par [facility]\par \par

## 2019-07-22 NOTE — DISCUSSION/SUMMARY
[FreeTextEntry1] : COPD Gold class III -IV, Post TAVR, smoke free\par close to baseline\par echocardiogram post TAVR stable per pt\par lung exam without bronchospasm, adequate 02 , has home 02\par compliance with Anoro daily, duo neb bid and prn\par continued Smoking cessation \par Needs repeat CT scan, ordered for August\par Pulmonary rehabilitation \par spirometry stable\par 12 weeks or sooner if needed\par \par

## 2019-07-22 NOTE — HISTORY OF PRESENT ILLNESS
[FreeTextEntry1] : post TAVR GSH\par at basline\par no fever, chill, chest pain\par no purulent sputum

## 2019-07-24 ENCOUNTER — APPOINTMENT (OUTPATIENT)
Dept: INTERNAL MEDICINE | Facility: CLINIC | Age: 84
End: 2019-07-24
Payer: MEDICARE

## 2019-07-24 VITALS
WEIGHT: 120 LBS | HEIGHT: 62 IN | BODY MASS INDEX: 22.08 KG/M2 | DIASTOLIC BLOOD PRESSURE: 70 MMHG | SYSTOLIC BLOOD PRESSURE: 130 MMHG

## 2019-07-24 DIAGNOSIS — Z95.2 PRESENCE OF PROSTHETIC HEART VALVE: ICD-10-CM

## 2019-07-24 DIAGNOSIS — R13.10 DYSPHAGIA, UNSPECIFIED: ICD-10-CM

## 2019-07-24 PROCEDURE — 99214 OFFICE O/P EST MOD 30 MIN: CPT | Mod: 25

## 2019-07-24 PROCEDURE — 36415 COLL VENOUS BLD VENIPUNCTURE: CPT

## 2019-07-24 NOTE — PHYSICAL EXAM
[No Acute Distress] : no acute distress [Well Nourished] : well nourished [Well Developed] : well developed [Well-Appearing] : well-appearing [Normal Sclera/Conjunctiva] : normal sclera/conjunctiva [PERRL] : pupils equal round and reactive to light [EOMI] : extraocular movements intact [Normal Oropharynx] : the oropharynx was normal [Normal Outer Ear/Nose] : the outer ears and nose were normal in appearance [No JVD] : no jugular venous distention [No Lymphadenopathy] : no lymphadenopathy [Supple] : supple [Thyroid Normal, No Nodules] : the thyroid was normal and there were no nodules present [No Accessory Muscle Use] : no accessory muscle use [No Respiratory Distress] : no respiratory distress  [Clear to Auscultation] : lungs were clear to auscultation bilaterally [Normal Rate] : normal rate  [Regular Rhythm] : with a regular rhythm [Normal S1, S2] : normal S1 and S2 [No Murmur] : no murmur heard [No Carotid Bruits] : no carotid bruits [No Varicosities] : no varicosities [No Abdominal Bruit] : a ~M bruit was not heard ~T in the abdomen [No Edema] : there was no peripheral edema [Pedal Pulses Present] : the pedal pulses are present [No Palpable Aorta] : no palpable aorta [No Extremity Clubbing/Cyanosis] : no extremity clubbing/cyanosis [Soft] : abdomen soft [Non Tender] : non-tender [Non-distended] : non-distended [No Masses] : no abdominal mass palpated [No HSM] : no HSM [Normal Bowel Sounds] : normal bowel sounds [Normal Anterior Cervical Nodes] : no anterior cervical lymphadenopathy [Normal Posterior Cervical Nodes] : no posterior cervical lymphadenopathy [No Spinal Tenderness] : no spinal tenderness [No CVA Tenderness] : no CVA  tenderness [Grossly Normal Strength/Tone] : grossly normal strength/tone [No Joint Swelling] : no joint swelling [No Rash] : no rash [Coordination Grossly Intact] : coordination grossly intact [No Focal Deficits] : no focal deficits [Normal Gait] : normal gait [Normal Affect] : the affect was normal [Deep Tendon Reflexes (DTR)] : deep tendon reflexes were 2+ and symmetric [Normal Insight/Judgement] : insight and judgment were intact

## 2019-07-24 NOTE — HISTORY OF PRESENT ILLNESS
[FreeTextEntry1] : FOLLOW UP ON HEART ISSUES [de-identified] : PT HERE FOR FOLLOW UP FEELS OK  S/P   TAVR  3/27 DR SAUCEDO  OVERALL FEELSOK\par PT HAS COPD AND USES O2 AT NIGHT\par OVERALL FEELING BETTER HAS SOME JOINT PAIN IN THE HAND

## 2019-07-24 NOTE — PLAN
[FreeTextEntry1] : PT HERE FOR FOLLOW UP FEELS OK  S/P   TAVR  3/27 DR SAUCEDO  OVERALL FEELSOK\par PT HAS COPD AND USES O2 AT NIGHT\par OVERALL FEELING BETTER HAS SOME JOINT PAIN IN THE HAND\par PROBABLE ARTHRITIS\par FEELS  MORE ENERGY IN AM\par TAVR FOLLOWUP WITH CARDIOLOGY SHOULD BE ABLE TO STOP PLAVIX END OF JUNE BUT WILL HAVE HER DOUBLE CHECK WITH DR SOLORZANO\par CHECK Lipid PROFILE

## 2019-07-25 LAB
ALBUMIN SERPL ELPH-MCNC: 4.6 G/DL
ALP BLD-CCNC: 71 U/L
ALT SERPL-CCNC: 7 U/L
AST SERPL-CCNC: 27 U/L
BILIRUB DIRECT SERPL-MCNC: 0.1 MG/DL
BILIRUB INDIRECT SERPL-MCNC: 0.2 MG/DL
BILIRUB SERPL-MCNC: 0.2 MG/DL
CHOLEST SERPL-MCNC: 160 MG/DL
CHOLEST/HDLC SERPL: 2.5 RATIO
HDLC SERPL-MCNC: 64 MG/DL
LDLC SERPL CALC-MCNC: 82 MG/DL
PROT SERPL-MCNC: 7.9 G/DL
TRIGL SERPL-MCNC: 71 MG/DL

## 2019-08-13 ENCOUNTER — FORM ENCOUNTER (OUTPATIENT)
Age: 84
End: 2019-08-13

## 2019-08-14 ENCOUNTER — APPOINTMENT (OUTPATIENT)
Dept: CT IMAGING | Facility: CLINIC | Age: 84
End: 2019-08-14
Payer: MEDICARE

## 2019-08-14 ENCOUNTER — OUTPATIENT (OUTPATIENT)
Dept: OUTPATIENT SERVICES | Facility: HOSPITAL | Age: 84
LOS: 1 days | End: 2019-08-14
Payer: MEDICARE

## 2019-08-14 DIAGNOSIS — K76.89 OTHER SPECIFIED DISEASES OF LIVER: Chronic | ICD-10-CM

## 2019-08-14 DIAGNOSIS — Z98.890 OTHER SPECIFIED POSTPROCEDURAL STATES: Chronic | ICD-10-CM

## 2019-08-14 DIAGNOSIS — J44.9 CHRONIC OBSTRUCTIVE PULMONARY DISEASE, UNSPECIFIED: ICD-10-CM

## 2019-08-14 DIAGNOSIS — Z96.619 PRESENCE OF UNSPECIFIED ARTIFICIAL SHOULDER JOINT: Chronic | ICD-10-CM

## 2019-08-14 DIAGNOSIS — R91.8 OTHER NONSPECIFIC ABNORMAL FINDING OF LUNG FIELD: ICD-10-CM

## 2019-08-14 PROCEDURE — 71250 CT THORAX DX C-: CPT | Mod: 26

## 2019-08-14 PROCEDURE — 71250 CT THORAX DX C-: CPT

## 2019-08-16 ENCOUNTER — MESSAGE (OUTPATIENT)
Age: 84
End: 2019-08-16

## 2019-09-25 ENCOUNTER — INPATIENT (INPATIENT)
Facility: HOSPITAL | Age: 84
LOS: 5 days | Discharge: HOME | DRG: 871 | End: 2019-10-01
Attending: INTERNAL MEDICINE | Admitting: GENERAL ACUTE CARE HOSPITAL
Payer: MEDICARE

## 2019-09-25 VITALS
DIASTOLIC BLOOD PRESSURE: 80 MMHG | SYSTOLIC BLOOD PRESSURE: 186 MMHG | OXYGEN SATURATION: 95 % | HEART RATE: 86 BPM | WEIGHT: 121.03 LBS | TEMPERATURE: 98 F | RESPIRATION RATE: 20 BRPM

## 2019-09-25 DIAGNOSIS — Z98.890 OTHER SPECIFIED POSTPROCEDURAL STATES: Chronic | ICD-10-CM

## 2019-09-25 DIAGNOSIS — K76.89 OTHER SPECIFIED DISEASES OF LIVER: Chronic | ICD-10-CM

## 2019-09-25 DIAGNOSIS — J18.1 LOBAR PNEUMONIA, UNSPECIFIED ORGANISM: ICD-10-CM

## 2019-09-25 DIAGNOSIS — Z96.619 PRESENCE OF UNSPECIFIED ARTIFICIAL SHOULDER JOINT: Chronic | ICD-10-CM

## 2019-09-25 LAB
ALBUMIN SERPL ELPH-MCNC: 3.9 G/DL — SIGNIFICANT CHANGE UP (ref 3.3–5.2)
ALP SERPL-CCNC: 69 U/L — SIGNIFICANT CHANGE UP (ref 40–120)
ALT FLD-CCNC: 7 U/L — SIGNIFICANT CHANGE UP
ANION GAP SERPL CALC-SCNC: 13 MMOL/L — SIGNIFICANT CHANGE UP (ref 5–17)
APPEARANCE UR: CLEAR — SIGNIFICANT CHANGE UP
APTT BLD: 34.1 SEC — SIGNIFICANT CHANGE UP (ref 27.5–36.3)
AST SERPL-CCNC: 21 U/L — SIGNIFICANT CHANGE UP
BASE EXCESS BLDV CALC-SCNC: 6 MMOL/L — HIGH (ref -2–2)
BASOPHILS # BLD AUTO: 0.04 K/UL — SIGNIFICANT CHANGE UP (ref 0–0.2)
BASOPHILS NFR BLD AUTO: 0.3 % — SIGNIFICANT CHANGE UP (ref 0–2)
BILIRUB SERPL-MCNC: 0.7 MG/DL — SIGNIFICANT CHANGE UP (ref 0.4–2)
BILIRUB UR-MCNC: NEGATIVE — SIGNIFICANT CHANGE UP
BUN SERPL-MCNC: 15 MG/DL — SIGNIFICANT CHANGE UP (ref 8–20)
CA-I SERPL-SCNC: 1.06 MMOL/L — LOW (ref 1.15–1.33)
CALCIUM SERPL-MCNC: 9.7 MG/DL — SIGNIFICANT CHANGE UP (ref 8.6–10.2)
CHLORIDE BLDV-SCNC: 101 MMOL/L — SIGNIFICANT CHANGE UP (ref 98–107)
CHLORIDE SERPL-SCNC: 99 MMOL/L — SIGNIFICANT CHANGE UP (ref 98–107)
CO2 SERPL-SCNC: 28 MMOL/L — SIGNIFICANT CHANGE UP (ref 22–29)
COLOR SPEC: YELLOW — SIGNIFICANT CHANGE UP
CREAT SERPL-MCNC: 0.79 MG/DL — SIGNIFICANT CHANGE UP (ref 0.5–1.3)
DIFF PNL FLD: NEGATIVE — SIGNIFICANT CHANGE UP
EOSINOPHIL # BLD AUTO: 0.03 K/UL — SIGNIFICANT CHANGE UP (ref 0–0.5)
EOSINOPHIL NFR BLD AUTO: 0.2 % — SIGNIFICANT CHANGE UP (ref 0–6)
EPI CELLS # UR: SIGNIFICANT CHANGE UP
FLU A RESULT: SIGNIFICANT CHANGE UP
FLU A RESULT: SIGNIFICANT CHANGE UP
FLUAV AG NPH QL: SIGNIFICANT CHANGE UP
FLUBV AG NPH QL: SIGNIFICANT CHANGE UP
GAS PNL BLDV: 142 MMOL/L — SIGNIFICANT CHANGE UP (ref 135–145)
GAS PNL BLDV: SIGNIFICANT CHANGE UP
GAS PNL BLDV: SIGNIFICANT CHANGE UP
GLUCOSE BLDV-MCNC: 165 MG/DL — HIGH (ref 70–99)
GLUCOSE SERPL-MCNC: 105 MG/DL — SIGNIFICANT CHANGE UP (ref 70–115)
GLUCOSE UR QL: NEGATIVE MG/DL — SIGNIFICANT CHANGE UP
HCO3 BLDV-SCNC: 30 MMOL/L — HIGH (ref 20–26)
HCT VFR BLD CALC: 40.3 % — SIGNIFICANT CHANGE UP (ref 34.5–45)
HCT VFR BLDA CALC: 38 — LOW (ref 39–50)
HGB BLD CALC-MCNC: 12.4 G/DL — SIGNIFICANT CHANGE UP (ref 11.5–15.5)
HGB BLD-MCNC: 12.6 G/DL — SIGNIFICANT CHANGE UP (ref 11.5–15.5)
IMM GRANULOCYTES NFR BLD AUTO: 0.4 % — SIGNIFICANT CHANGE UP (ref 0–1.5)
INR BLD: 1.11 RATIO — SIGNIFICANT CHANGE UP (ref 0.88–1.16)
KETONES UR-MCNC: NEGATIVE — SIGNIFICANT CHANGE UP
LACTATE BLDV-MCNC: 2 MMOL/L — SIGNIFICANT CHANGE UP (ref 0.5–2)
LACTATE BLDV-MCNC: 3.5 MMOL/L — HIGH (ref 0.5–2)
LEUKOCYTE ESTERASE UR-ACNC: NEGATIVE — SIGNIFICANT CHANGE UP
LYMPHOCYTES # BLD AUTO: 0.62 K/UL — LOW (ref 1–3.3)
LYMPHOCYTES # BLD AUTO: 4.4 % — LOW (ref 13–44)
MCHC RBC-ENTMCNC: 28.3 PG — SIGNIFICANT CHANGE UP (ref 27–34)
MCHC RBC-ENTMCNC: 31.3 GM/DL — LOW (ref 32–36)
MCV RBC AUTO: 90.6 FL — SIGNIFICANT CHANGE UP (ref 80–100)
MONOCYTES # BLD AUTO: 0.86 K/UL — SIGNIFICANT CHANGE UP (ref 0–0.9)
MONOCYTES NFR BLD AUTO: 6.2 % — SIGNIFICANT CHANGE UP (ref 2–14)
NEUTROPHILS # BLD AUTO: 12.35 K/UL — HIGH (ref 1.8–7.4)
NEUTROPHILS NFR BLD AUTO: 88.5 % — HIGH (ref 43–77)
NITRITE UR-MCNC: NEGATIVE — SIGNIFICANT CHANGE UP
OTHER CELLS CSF MANUAL: 17 ML/DL — LOW (ref 18–22)
PCO2 BLDV: 42 MMHG — SIGNIFICANT CHANGE UP (ref 35–50)
PH BLDV: 7.47 — HIGH (ref 7.32–7.43)
PH UR: 8 — SIGNIFICANT CHANGE UP (ref 5–8)
PLATELET # BLD AUTO: 197 K/UL — SIGNIFICANT CHANGE UP (ref 150–400)
PO2 BLDV: 160 MMHG — HIGH (ref 25–45)
POTASSIUM BLDV-SCNC: 3.6 MMOL/L — SIGNIFICANT CHANGE UP (ref 3.4–4.5)
POTASSIUM SERPL-MCNC: 4.2 MMOL/L — SIGNIFICANT CHANGE UP (ref 3.5–5.3)
POTASSIUM SERPL-SCNC: 4.2 MMOL/L — SIGNIFICANT CHANGE UP (ref 3.5–5.3)
PROT SERPL-MCNC: 7.4 G/DL — SIGNIFICANT CHANGE UP (ref 6.6–8.7)
PROT UR-MCNC: 15 MG/DL
PROTHROM AB SERPL-ACNC: 12.8 SEC — SIGNIFICANT CHANGE UP (ref 10–12.9)
RAPID RVP RESULT: SIGNIFICANT CHANGE UP
RBC # BLD: 4.45 M/UL — SIGNIFICANT CHANGE UP (ref 3.8–5.2)
RBC # FLD: 15 % — HIGH (ref 10.3–14.5)
RSV RESULT: SIGNIFICANT CHANGE UP
RSV RNA RESP QL NAA+PROBE: SIGNIFICANT CHANGE UP
SAO2 % BLDV: 100 % — SIGNIFICANT CHANGE UP
SODIUM SERPL-SCNC: 140 MMOL/L — SIGNIFICANT CHANGE UP (ref 135–145)
SP GR SPEC: 1.01 — SIGNIFICANT CHANGE UP (ref 1.01–1.02)
TROPONIN T SERPL-MCNC: <0.01 NG/ML — SIGNIFICANT CHANGE UP (ref 0–0.06)
UROBILINOGEN FLD QL: 1 MG/DL
WBC # BLD: 13.96 K/UL — HIGH (ref 3.8–10.5)
WBC # FLD AUTO: 13.96 K/UL — HIGH (ref 3.8–10.5)
WBC UR QL: SIGNIFICANT CHANGE UP

## 2019-09-25 PROCEDURE — 93010 ELECTROCARDIOGRAM REPORT: CPT

## 2019-09-25 PROCEDURE — 99285 EMERGENCY DEPT VISIT HI MDM: CPT

## 2019-09-25 PROCEDURE — 71045 X-RAY EXAM CHEST 1 VIEW: CPT | Mod: 26

## 2019-09-25 PROCEDURE — 71250 CT THORAX DX C-: CPT | Mod: 26

## 2019-09-25 PROCEDURE — 99223 1ST HOSP IP/OBS HIGH 75: CPT

## 2019-09-25 RX ORDER — SODIUM CHLORIDE 9 MG/ML
1700 INJECTION, SOLUTION INTRAVENOUS ONCE
Refills: 0 | Status: COMPLETED | OUTPATIENT
Start: 2019-09-25 | End: 2019-09-25

## 2019-09-25 RX ORDER — SIMVASTATIN 20 MG/1
20 TABLET, FILM COATED ORAL AT BEDTIME
Refills: 0 | Status: DISCONTINUED | OUTPATIENT
Start: 2019-09-25 | End: 2019-10-01

## 2019-09-25 RX ORDER — ACETAMINOPHEN 500 MG
650 TABLET ORAL EVERY 6 HOURS
Refills: 0 | Status: DISCONTINUED | OUTPATIENT
Start: 2019-09-25 | End: 2019-10-01

## 2019-09-25 RX ORDER — ENOXAPARIN SODIUM 100 MG/ML
40 INJECTION SUBCUTANEOUS DAILY
Refills: 0 | Status: DISCONTINUED | OUTPATIENT
Start: 2019-09-26 | End: 2019-10-01

## 2019-09-25 RX ORDER — ASPIRIN/CALCIUM CARB/MAGNESIUM 324 MG
81 TABLET ORAL DAILY
Refills: 0 | Status: DISCONTINUED | OUTPATIENT
Start: 2019-09-25 | End: 2019-10-01

## 2019-09-25 RX ORDER — AZITHROMYCIN 500 MG/1
500 TABLET, FILM COATED ORAL DAILY
Refills: 0 | Status: DISCONTINUED | OUTPATIENT
Start: 2019-09-25 | End: 2019-09-30

## 2019-09-25 RX ORDER — ACETAMINOPHEN 500 MG
650 TABLET ORAL ONCE
Refills: 0 | Status: COMPLETED | OUTPATIENT
Start: 2019-09-25 | End: 2019-09-25

## 2019-09-25 RX ORDER — FUROSEMIDE 40 MG
20 TABLET ORAL DAILY
Refills: 0 | Status: DISCONTINUED | OUTPATIENT
Start: 2019-09-26 | End: 2019-10-01

## 2019-09-25 RX ORDER — SERTRALINE 25 MG/1
25 TABLET, FILM COATED ORAL DAILY
Refills: 0 | Status: DISCONTINUED | OUTPATIENT
Start: 2019-09-25 | End: 2019-10-01

## 2019-09-25 RX ORDER — CEFTRIAXONE 500 MG/1
1000 INJECTION, POWDER, FOR SOLUTION INTRAMUSCULAR; INTRAVENOUS EVERY 24 HOURS
Refills: 0 | Status: COMPLETED | OUTPATIENT
Start: 2019-09-25 | End: 2019-09-29

## 2019-09-25 RX ORDER — FUROSEMIDE 40 MG
40 TABLET ORAL DAILY
Refills: 0 | Status: DISCONTINUED | OUTPATIENT
Start: 2019-09-25 | End: 2019-09-25

## 2019-09-25 RX ORDER — ATENOLOL 25 MG/1
25 TABLET ORAL DAILY
Refills: 0 | Status: DISCONTINUED | OUTPATIENT
Start: 2019-09-25 | End: 2019-10-01

## 2019-09-25 RX ORDER — IPRATROPIUM/ALBUTEROL SULFATE 18-103MCG
3 AEROSOL WITH ADAPTER (GRAM) INHALATION ONCE
Refills: 0 | Status: COMPLETED | OUTPATIENT
Start: 2019-09-25 | End: 2019-09-25

## 2019-09-25 RX ORDER — SODIUM CHLORIDE 9 MG/ML
1700 INJECTION INTRAMUSCULAR; INTRAVENOUS; SUBCUTANEOUS ONCE
Refills: 0 | Status: DISCONTINUED | OUTPATIENT
Start: 2019-09-25 | End: 2019-09-25

## 2019-09-25 RX ORDER — IPRATROPIUM/ALBUTEROL SULFATE 18-103MCG
3 AEROSOL WITH ADAPTER (GRAM) INHALATION EVERY 6 HOURS
Refills: 0 | Status: DISCONTINUED | OUTPATIENT
Start: 2019-09-25 | End: 2019-10-01

## 2019-09-25 RX ORDER — CLOPIDOGREL BISULFATE 75 MG/1
75 TABLET, FILM COATED ORAL DAILY
Refills: 0 | Status: DISCONTINUED | OUTPATIENT
Start: 2019-09-25 | End: 2019-10-01

## 2019-09-25 RX ORDER — VANCOMYCIN HCL 1 G
1000 VIAL (EA) INTRAVENOUS ONCE
Refills: 0 | Status: COMPLETED | OUTPATIENT
Start: 2019-09-25 | End: 2019-09-25

## 2019-09-25 RX ADMIN — AZITHROMYCIN 500 MILLIGRAM(S): 500 TABLET, FILM COATED ORAL at 21:24

## 2019-09-25 RX ADMIN — ATENOLOL 25 MILLIGRAM(S): 25 TABLET ORAL at 21:24

## 2019-09-25 RX ADMIN — SERTRALINE 25 MILLIGRAM(S): 25 TABLET, FILM COATED ORAL at 21:23

## 2019-09-25 RX ADMIN — Medication 3 MILLILITER(S): at 14:33

## 2019-09-25 RX ADMIN — Medication 125 MILLIGRAM(S): at 14:33

## 2019-09-25 RX ADMIN — Medication 250 MILLIGRAM(S): at 14:14

## 2019-09-25 RX ADMIN — Medication 650 MILLIGRAM(S): at 11:39

## 2019-09-25 RX ADMIN — Medication 81 MILLIGRAM(S): at 21:24

## 2019-09-25 RX ADMIN — Medication 1000 MILLIGRAM(S): at 15:11

## 2019-09-25 RX ADMIN — CEFTRIAXONE 100 MILLIGRAM(S): 500 INJECTION, POWDER, FOR SOLUTION INTRAMUSCULAR; INTRAVENOUS at 21:24

## 2019-09-25 RX ADMIN — Medication 3 MILLILITER(S): at 20:51

## 2019-09-25 RX ADMIN — CLOPIDOGREL BISULFATE 75 MILLIGRAM(S): 75 TABLET, FILM COATED ORAL at 21:23

## 2019-09-25 RX ADMIN — SIMVASTATIN 20 MILLIGRAM(S): 20 TABLET, FILM COATED ORAL at 21:23

## 2019-09-25 RX ADMIN — SODIUM CHLORIDE 850 MILLILITER(S): 9 INJECTION, SOLUTION INTRAVENOUS at 11:30

## 2019-09-25 NOTE — ED PROVIDER NOTE - PSH
Anal fistula    Ectopic pregnancy    H/O shoulder replacement  right  History of appendectomy    History of cholecystectomy    History of coronary angiogram  dr saenz  [facility]  2016  History of tonsillectomy    Liver cyst  surgical removal of multiple liver cysts

## 2019-09-25 NOTE — ED PROVIDER NOTE - PHYSICAL EXAMINATION
General:     NAD, well-nourished, well-appearing  Head:     NC/AT, EOMI, oral mucosa moist  Neck:     trachea midline  Lungs:   bibasilar crackles, no retractions  CVS:     S1S2, RRR, no m/g/r  Abd:     +BS, s/nt/nd, no organomegaly  Ext:    2+ radial and pedal pulses, trace pedal edema  Neuro: AAOx3, no sensory/motor deficits

## 2019-09-25 NOTE — ED ADULT NURSE REASSESSMENT NOTE - NS ED NURSE REASSESS COMMENT FT1
assumed pt care from previous RN. pt is lying in bed on a bedside cardiac monitor with family at bedside. pt is alert and oriented x3. pt is on 2L via NC. pt denies any pain or discomfort at this time. pt has #20G iv in the left AC. repeat vital signs taken. will continue to monitor and reassess

## 2019-09-25 NOTE — H&P ADULT - HISTORY OF PRESENT ILLNESS
Patient is a 86 year old female with PMH of COPD on home O2, AS s/p TAVR, HTN, CAD, Depression and HLD who presented to the ED with worsening exertional dyspnea. Patient reports she generally has a degree of dyspnea with moderate exertion, but over the past few days she has experienced dyspnea with minimal exertion. States the shortness of breath is associated with a mild productive cough, and then she developed a fever of 102 last night with chills. Also, reports bilateral rib pain which lasted a few minutes and has resolved. Patient states she also noted her legs appeared to be more swollen than usual. Denies dizziness, lightheadedness, chest pain, nausea, vomiting, abdominal pain or diarrhea. Denies sick contacts or recent travel. In the ED, patient was also noted to be febrile with leukocytosis. CXR was clear, but patient was presumed to have pneumonia and started on empiric antibiotics.

## 2019-09-25 NOTE — H&P ADULT - NSICDXPASTMEDICALHX_GEN_ALL_CORE_FT
PAST MEDICAL HISTORY:  Aortic stenosis     Aortic valve insufficiency     Arthritis osteoarthritis    Cataract     COPD (chronic obstructive pulmonary disease)     BENITEZ (dyspnea on exertion)     Hiatal hernia     HLD (hyperlipidemia)     HTN (hypertension)     Humerus fracture, right, sequela residual weakness    Left ventricular outflow tract obstruction     Liver, polycystic     Macular degeneration of both eyes     Mitral valve insufficiency     Pericardial effusion     Smoker unmotivated to quit     SOB (shortness of breath)     Unstable angina

## 2019-09-25 NOTE — ED ADULT TRIAGE NOTE - CHIEF COMPLAINT QUOTE
"I am having SOB and pain in my back and ribs, chest discomfort and swelling in my feet"  Pt on home O2 A & OX4,

## 2019-09-25 NOTE — H&P ADULT - NSICDXPASTSURGICALHX_GEN_ALL_CORE_FT
PAST SURGICAL HISTORY:  Anal fistula     Ectopic pregnancy     H/O shoulder replacement right    History of appendectomy     History of cholecystectomy     History of coronary angiogram dr saenz  [facility]  2016    History of tonsillectomy     Liver cyst surgical removal of multiple liver cysts

## 2019-09-25 NOTE — ED PROVIDER NOTE - OBJECTIVE STATEMENT
86yoF; with pmh signif for CHF, HTN, HLD, COPD (on Oxygen PRN), s/p TAVR; now p/w sob x2 days, with cough and sputum production. began having fever and chills today. denies sick contacts. denies travel. denies unusual PO intake.  c/o abd pain--lower suprapubic, pressure, radiating to back. denies n/v/d. denies dysuria, hematuria, frequency, urgency.,

## 2019-09-25 NOTE — H&P ADULT - ASSESSMENT
Patient is a 86 year old female with PMH of COPD on home O2, AS s/p TAVR, HTN, CAD, Depression and HLD who presented to the ED with worsening exertional dyspnea.     1. Acute on Chronic Hypoxic Respiratory Failure likely due to Community Acquired PNA (suspected gram positive and gram negative organism)  -admit to monitored bed +   -f/u blood cultures  -check RVP and urine legionella  -started on ceftriaxone and azithromycin  -lactate elevated; repeat s/p fluid bolus in ED  -avoid further IVF; resume lasix   -CXR clear; but will CT chest  -check procal  -check BNP and TTE  -no indication for systemic steroids at this time  -supplemental O2 and bronchodilators as needed  -will consider cardio evaluation ([facility])    2. AS s/p TAVR   -in March 2019  -check TTE    3. HTN  -BP elevated; resume home medications  -low sodium diet     4. HLD  -check lipid panel  -continue statin    5. Depression  -continue sertraline    DVT ppx - Lovenox SC

## 2019-09-25 NOTE — ED ADULT NURSE NOTE - OBJECTIVE STATEMENT
pt with fevers at home for 1 days with productive cough and difficulty breathing. pt wears oxygen at home as needed. pt noted with swelling in legs.

## 2019-09-25 NOTE — ED ADULT NURSE NOTE - PSH
Anal fistula    Ectopic pregnancy    H/O shoulder replacement  right  History of appendectomy    History of cholecystectomy    History of coronary angiogram  dr saenz  [facility]  2016  History of tonsillectomy    Liver cyst  surgical removal of multiple liver cysts

## 2019-09-25 NOTE — ED PROVIDER NOTE - CLINICAL SUMMARY MEDICAL DECISION MAKING FREE TEXT BOX
patient arrived with cough, fever, sob. found to have elevated wbc, elevated lactate, RLL infiltrate on cxr, will admit for abx.

## 2019-09-25 NOTE — ED ADULT NURSE REASSESSMENT NOTE - NS ED NURSE REASSESS COMMENT FT1
assumed care of patient 1215, respirations even unlabored. pt on 2 LNC, family at bedside. pt denies pain. pt educated on plan of care, pt able to successfully teach back plan of care to RN, RN will continue to reeducate pt during hospital stay.

## 2019-09-25 NOTE — ED PROVIDER NOTE - CARE PLAN
Principal Discharge DX:	Pneumonia of right lower lobe due to infectious organism  Secondary Diagnosis:	Sepsis

## 2019-09-25 NOTE — ED STATDOCS - PROGRESS NOTE DETAILS
87 y/o F pt with PMHx of Plavix, aspirin, COPD, HLD, HTN, aortic valve replacement presents to the ED c/o shortness of breath. Yesterday patient began with difficulty breathing, chest pain, flank pain, fever and lower extremity swelling. Last February patient had double pneumonia. Denies n/v/d. Pt will be placed in the main ED for complete evaluation by another provider.   Allergic to penicillin and sulfa. 85 y/o F pt with PMHx of Plavix, aspirin, COPD, HLD, HTN, aortic valve replacement presents to the ED c/o shortness of breath. Yesterday patient began with difficulty breathing, chest pain, flank pain, fever and lower extremity swelling. Last February patient had double pneumonia. Denies n/v/d. Pt will be placed in the main ED for complete evaluation by another provider. code sepsis called, rectal temp 101, crackles on left  Allergic to penicillin and sulfa.

## 2019-09-25 NOTE — ED PROVIDER NOTE - NS ED ROS FT
Constitutional: (+) fever  (+)chills  (-)sweats  Eyes/ENT: (-) blurry vision, (-) epistaxis  (-)rhinorrhea   (-) sore throat    Cardiovascular: (-) chest pain, (-) palpitations (-) edema   Respiratory: (+) cough, (+) shortness of breath   Gastrointestinal: (-)nausea  (-)vomiting, (-) diarrhea  +) abdominal pain   :  (-)dysuria, (-)frequency, (-)urgency, (-)hematuria  Musculoskeletal: (-) neck pain, (-) back pain, (-) joint pain  Integumentary: (-) rash, (-) edema  Neurological: (-) headache, (-) altered mental status  (-)LOC

## 2019-09-26 LAB
ANION GAP SERPL CALC-SCNC: 10 MMOL/L — SIGNIFICANT CHANGE UP (ref 5–17)
BASOPHILS # BLD AUTO: 0.02 K/UL — SIGNIFICANT CHANGE UP (ref 0–0.2)
BASOPHILS NFR BLD AUTO: 0.2 % — SIGNIFICANT CHANGE UP (ref 0–2)
BUN SERPL-MCNC: 14 MG/DL — SIGNIFICANT CHANGE UP (ref 8–20)
CALCIUM SERPL-MCNC: 9.2 MG/DL — SIGNIFICANT CHANGE UP (ref 8.6–10.2)
CHLORIDE SERPL-SCNC: 103 MMOL/L — SIGNIFICANT CHANGE UP (ref 98–107)
CO2 SERPL-SCNC: 29 MMOL/L — SIGNIFICANT CHANGE UP (ref 22–29)
CREAT SERPL-MCNC: 0.7 MG/DL — SIGNIFICANT CHANGE UP (ref 0.5–1.3)
CULTURE RESULTS: NO GROWTH — SIGNIFICANT CHANGE UP
EOSINOPHIL # BLD AUTO: 0 K/UL — SIGNIFICANT CHANGE UP (ref 0–0.5)
EOSINOPHIL NFR BLD AUTO: 0 % — SIGNIFICANT CHANGE UP (ref 0–6)
GLUCOSE SERPL-MCNC: 107 MG/DL — SIGNIFICANT CHANGE UP (ref 70–115)
HCT VFR BLD CALC: 34.1 % — LOW (ref 34.5–45)
HGB BLD-MCNC: 10.9 G/DL — LOW (ref 11.5–15.5)
IMM GRANULOCYTES NFR BLD AUTO: 0.7 % — SIGNIFICANT CHANGE UP (ref 0–1.5)
LACTATE SERPL-SCNC: 0.6 MMOL/L — SIGNIFICANT CHANGE UP (ref 0.5–2)
LYMPHOCYTES # BLD AUTO: 0.45 K/UL — LOW (ref 1–3.3)
LYMPHOCYTES # BLD AUTO: 3.6 % — LOW (ref 13–44)
MAGNESIUM SERPL-MCNC: 1.9 MG/DL — SIGNIFICANT CHANGE UP (ref 1.6–2.6)
MCHC RBC-ENTMCNC: 28.8 PG — SIGNIFICANT CHANGE UP (ref 27–34)
MCHC RBC-ENTMCNC: 32 GM/DL — SIGNIFICANT CHANGE UP (ref 32–36)
MCV RBC AUTO: 90 FL — SIGNIFICANT CHANGE UP (ref 80–100)
MONOCYTES # BLD AUTO: 0.82 K/UL — SIGNIFICANT CHANGE UP (ref 0–0.9)
MONOCYTES NFR BLD AUTO: 6.5 % — SIGNIFICANT CHANGE UP (ref 2–14)
NEUTROPHILS # BLD AUTO: 11.25 K/UL — HIGH (ref 1.8–7.4)
NEUTROPHILS NFR BLD AUTO: 89 % — HIGH (ref 43–77)
NT-PROBNP SERPL-SCNC: 5546 PG/ML — HIGH (ref 0–300)
PHOSPHATE SERPL-MCNC: 2.9 MG/DL — SIGNIFICANT CHANGE UP (ref 2.4–4.7)
PLATELET # BLD AUTO: 167 K/UL — SIGNIFICANT CHANGE UP (ref 150–400)
POTASSIUM SERPL-MCNC: 4 MMOL/L — SIGNIFICANT CHANGE UP (ref 3.5–5.3)
POTASSIUM SERPL-SCNC: 4 MMOL/L — SIGNIFICANT CHANGE UP (ref 3.5–5.3)
PROCALCITONIN SERPL-MCNC: 0.29 NG/ML — HIGH (ref 0.02–0.1)
RBC # BLD: 3.79 M/UL — LOW (ref 3.8–5.2)
RBC # FLD: 14.8 % — HIGH (ref 10.3–14.5)
SODIUM SERPL-SCNC: 142 MMOL/L — SIGNIFICANT CHANGE UP (ref 135–145)
SPECIMEN SOURCE: SIGNIFICANT CHANGE UP
WBC # BLD: 12.63 K/UL — HIGH (ref 3.8–10.5)
WBC # FLD AUTO: 12.63 K/UL — HIGH (ref 3.8–10.5)

## 2019-09-26 PROCEDURE — 99233 SBSQ HOSP IP/OBS HIGH 50: CPT

## 2019-09-26 RX ORDER — SACCHAROMYCES BOULARDII 250 MG
250 POWDER IN PACKET (EA) ORAL
Refills: 0 | Status: DISCONTINUED | OUTPATIENT
Start: 2019-09-26 | End: 2019-10-01

## 2019-09-26 RX ADMIN — Medication 81 MILLIGRAM(S): at 12:24

## 2019-09-26 RX ADMIN — CLOPIDOGREL BISULFATE 75 MILLIGRAM(S): 75 TABLET, FILM COATED ORAL at 12:24

## 2019-09-26 RX ADMIN — Medication 3 MILLILITER(S): at 20:45

## 2019-09-26 RX ADMIN — Medication 3 MILLILITER(S): at 10:24

## 2019-09-26 RX ADMIN — SIMVASTATIN 20 MILLIGRAM(S): 20 TABLET, FILM COATED ORAL at 21:31

## 2019-09-26 RX ADMIN — ENOXAPARIN SODIUM 40 MILLIGRAM(S): 100 INJECTION SUBCUTANEOUS at 12:24

## 2019-09-26 RX ADMIN — Medication 250 MILLIGRAM(S): at 17:16

## 2019-09-26 RX ADMIN — Medication 3 MILLILITER(S): at 15:55

## 2019-09-26 RX ADMIN — ATENOLOL 25 MILLIGRAM(S): 25 TABLET ORAL at 05:35

## 2019-09-26 RX ADMIN — Medication 20 MILLIGRAM(S): at 05:35

## 2019-09-26 RX ADMIN — AZITHROMYCIN 500 MILLIGRAM(S): 500 TABLET, FILM COATED ORAL at 12:23

## 2019-09-26 NOTE — PROGRESS NOTE ADULT - ASSESSMENT
Patient is a 86 year old female with PMH of COPD on home O2, AS s/p TAVR, HTN, CAD, Depression and HLD who presented to the ED with worsening exertional dyspnea.     1. Acute on Chronic Hypoxic Respiratory Failure likely due to Community Acquired PNA (suspected gram positive and gram negative organism)  -f/u blood cultures  -RVP negative  -check urine legionella  -continue ceftriaxone and azithromycin  -repeat lactate WNL  -no evidence of decompensated heart failure, continue current dose of lasix  -CT chest with bilateral infiltrates   -procal 0.2  -BNP 7700 and check TTE  -no indication for systemic steroids at this time  -supplemental O2 and bronchodilators as needed  -cardio consult appreciated    2. AS s/p TAVR   -in March 2019  -TTE to be done    3. HTN  -BP stable, continue home medications  -low sodium diet     4. HLD  -check lipid panel  -continue statin    5. Depression  -continue sertraline    DVT ppx - Lovenox SC    Dispo - PT evaluation. Tentative plans for discharge in 24-48 hours pending blood cultures and TTE.

## 2019-09-26 NOTE — PROGRESS NOTE ADULT - SUBJECTIVE AND OBJECTIVE BOX
CHIEF COMPLAINT/INTERVAL HISTORY:    Patient is a 86y old  Female who presents with a chief complaint of SOB (26 Sep 2019 09:37)    SUBJECTIVE & OBJECTIVE: Pt seen and examined at bedside. No overnight events. CT with bilateral infiltrates. Patient reports feeling better with IV abx. Repeat TTE pending. Cardio consult appreciated.    ROS: No chest pain, palpitations, light headedness, dizziness, headache, nausea/vomiting, fevers/chills, abdominal pain, dysuria or increased urinary frequency.    ICU Vital Signs Last 24 Hrs  T(C): 36.8 (26 Sep 2019 07:40), Max: 36.8 (26 Sep 2019 07:40)  T(F): 98.3 (26 Sep 2019 07:40), Max: 98.3 (26 Sep 2019 07:40)  HR: 63 (26 Sep 2019 10:25) (61 - 78)  BP: 136/64 (26 Sep 2019 07:40) (120/52 - 136/64)  RR: 20 (26 Sep 2019 04:49) (20 - 20)  SpO2: 98% (26 Sep 2019 10:25) (93% - 100%)    MEDICATIONS  (STANDING):  ALBUTerol/ipratropium for Nebulization 3 milliLiter(s) Nebulizer every 6 hours  aspirin enteric coated 81 milliGRAM(s) Oral daily  ATENolol  Tablet 25 milliGRAM(s) Oral daily  azithromycin   Tablet 500 milliGRAM(s) Oral daily  cefTRIAXone   IVPB 1000 milliGRAM(s) IV Intermittent every 24 hours  clopidogrel Tablet 75 milliGRAM(s) Oral daily  enoxaparin Injectable 40 milliGRAM(s) SubCutaneous daily  furosemide    Tablet 20 milliGRAM(s) Oral daily  saccharomyces boulardii 250 milliGRAM(s) Oral two times a day  sertraline 25 milliGRAM(s) Oral daily  simvastatin 20 milliGRAM(s) Oral at bedtime    MEDICATIONS  (PRN):  acetaminophen   Tablet .. 650 milliGRAM(s) Oral every 6 hours PRN Temp greater or equal to 38C (100.4F), Mild Pain (1 - 3)  guaiFENesin   Syrup  (Sugar-Free) 100 milliGRAM(s) Oral every 6 hours PRN Cough      LABS:                        10.9   12.63 )-----------( 167      ( 26 Sep 2019 06:13 )             34.1         142  |  103  |  14.0  ----------------------------<  107  4.0   |  29.0  |  0.70    Ca    9.2      26 Sep 2019 06:13  Phos  2.9       Mg     1.9         TPro  7.4  /  Alb  3.9  /  TBili  0.7  /  DBili  x   /  AST  21  /  ALT  7   /  AlkPhos  69      PT/INR - ( 25 Sep 2019 11:49 )   PT: 12.8 sec;   INR: 1.11 ratio         PTT - ( 25 Sep 2019 11:49 )  PTT:34.1 sec  Urinalysis Basic - ( 25 Sep 2019 13:33 )    Color: Yellow / Appearance: Clear / S.010 / pH: x  Gluc: x / Ketone: Negative  / Bili: Negative / Urobili: 1 mg/dL   Blood: x / Protein: 15 mg/dL / Nitrite: Negative   Leuk Esterase: Negative / RBC: x / WBC 3-5   Sq Epi: x / Non Sq Epi: Occasional / Bacteria: x    PHYSICAL EXAM:    GENERAL: elderly female, laying in bed, NAD  HEAD:  Atraumatic, Normocephalic  EYES: EOMI, PERRLA, conjunctiva and sclera clear  ENMT: Moist mucous membranes  NECK: Supple   NERVOUS SYSTEM:  Alert & Oriented X3   CHEST/LUNG: coarse breath sounds, no rales or wheezing  HEART: Regular rate and rhythm; + S1/S2, + Murmur  ABDOMEN: Soft, Nontender, Nondistended; Bowel sounds present  EXTREMITIES:  no pedal edema

## 2019-09-26 NOTE — PHYSICAL THERAPY INITIAL EVALUATION ADULT - LEVEL OF INDEPENDENCE: STAIR NEGOTIATION, REHAB EVAL
10 steps 1 rail modified I, states has a RW upstairs and downstairs to use prn, pt educated on platform step negotiation c RW c good understanding

## 2019-09-26 NOTE — PHYSICAL THERAPY INITIAL EVALUATION ADULT - GAIT PATTERN USED, PT EVAL
02 sat 93% p ambulation on 3L. trialed 20 feet without device requiring supervision for safety due to mild unsteadiness./swing-through gait

## 2019-09-26 NOTE — PHYSICAL THERAPY INITIAL EVALUATION ADULT - GENERAL OBSERVATIONS, REHAB EVAL
pt received supine in bed, NAD, agreeable to PT, O2 line, cardiac monitor, RN agreeable to PT, 2 daughters present at bedside

## 2019-09-26 NOTE — PHYSICAL THERAPY INITIAL EVALUATION ADULT - ADDITIONAL COMMENTS
owns and uses home O2, owns a RW but does not use it, 1 platform step to enter home c post, 10 steps 1 rail to shower within home

## 2019-09-27 LAB
ANION GAP SERPL CALC-SCNC: 10 MMOL/L — SIGNIFICANT CHANGE UP (ref 5–17)
BASOPHILS # BLD AUTO: 0.03 K/UL — SIGNIFICANT CHANGE UP (ref 0–0.2)
BASOPHILS NFR BLD AUTO: 0.5 % — SIGNIFICANT CHANGE UP (ref 0–2)
BUN SERPL-MCNC: 19 MG/DL — SIGNIFICANT CHANGE UP (ref 8–20)
CALCIUM SERPL-MCNC: 8.9 MG/DL — SIGNIFICANT CHANGE UP (ref 8.6–10.2)
CHLORIDE SERPL-SCNC: 103 MMOL/L — SIGNIFICANT CHANGE UP (ref 98–107)
CO2 SERPL-SCNC: 29 MMOL/L — SIGNIFICANT CHANGE UP (ref 22–29)
CREAT SERPL-MCNC: 0.72 MG/DL — SIGNIFICANT CHANGE UP (ref 0.5–1.3)
EOSINOPHIL # BLD AUTO: 0.35 K/UL — SIGNIFICANT CHANGE UP (ref 0–0.5)
EOSINOPHIL NFR BLD AUTO: 5.4 % — SIGNIFICANT CHANGE UP (ref 0–6)
GLUCOSE SERPL-MCNC: 107 MG/DL — SIGNIFICANT CHANGE UP (ref 70–115)
HCT VFR BLD CALC: 35.9 % — SIGNIFICANT CHANGE UP (ref 34.5–45)
HGB BLD-MCNC: 11.1 G/DL — LOW (ref 11.5–15.5)
IMM GRANULOCYTES NFR BLD AUTO: 0.8 % — SIGNIFICANT CHANGE UP (ref 0–1.5)
LEGIONELLA AG UR QL: NEGATIVE — SIGNIFICANT CHANGE UP
LYMPHOCYTES # BLD AUTO: 0.69 K/UL — LOW (ref 1–3.3)
LYMPHOCYTES # BLD AUTO: 10.7 % — LOW (ref 13–44)
MAGNESIUM SERPL-MCNC: 1.9 MG/DL — SIGNIFICANT CHANGE UP (ref 1.6–2.6)
MCHC RBC-ENTMCNC: 28.5 PG — SIGNIFICANT CHANGE UP (ref 27–34)
MCHC RBC-ENTMCNC: 30.9 GM/DL — LOW (ref 32–36)
MCV RBC AUTO: 92.3 FL — SIGNIFICANT CHANGE UP (ref 80–100)
MONOCYTES # BLD AUTO: 0.53 K/UL — SIGNIFICANT CHANGE UP (ref 0–0.9)
MONOCYTES NFR BLD AUTO: 8.2 % — SIGNIFICANT CHANGE UP (ref 2–14)
NEUTROPHILS # BLD AUTO: 4.82 K/UL — SIGNIFICANT CHANGE UP (ref 1.8–7.4)
NEUTROPHILS NFR BLD AUTO: 74.4 % — SIGNIFICANT CHANGE UP (ref 43–77)
PHOSPHATE SERPL-MCNC: 3.3 MG/DL — SIGNIFICANT CHANGE UP (ref 2.4–4.7)
PLATELET # BLD AUTO: 169 K/UL — SIGNIFICANT CHANGE UP (ref 150–400)
POTASSIUM SERPL-MCNC: 3.6 MMOL/L — SIGNIFICANT CHANGE UP (ref 3.5–5.3)
POTASSIUM SERPL-SCNC: 3.6 MMOL/L — SIGNIFICANT CHANGE UP (ref 3.5–5.3)
RBC # BLD: 3.89 M/UL — SIGNIFICANT CHANGE UP (ref 3.8–5.2)
RBC # FLD: 15 % — HIGH (ref 10.3–14.5)
SODIUM SERPL-SCNC: 142 MMOL/L — SIGNIFICANT CHANGE UP (ref 135–145)
WBC # BLD: 6.47 K/UL — SIGNIFICANT CHANGE UP (ref 3.8–10.5)
WBC # FLD AUTO: 6.47 K/UL — SIGNIFICANT CHANGE UP (ref 3.8–10.5)

## 2019-09-27 PROCEDURE — 99233 SBSQ HOSP IP/OBS HIGH 50: CPT

## 2019-09-27 RX ADMIN — Medication 81 MILLIGRAM(S): at 12:56

## 2019-09-27 RX ADMIN — SIMVASTATIN 20 MILLIGRAM(S): 20 TABLET, FILM COATED ORAL at 21:00

## 2019-09-27 RX ADMIN — CEFTRIAXONE 100 MILLIGRAM(S): 500 INJECTION, POWDER, FOR SOLUTION INTRAMUSCULAR; INTRAVENOUS at 21:00

## 2019-09-27 RX ADMIN — Medication 3 MILLILITER(S): at 20:41

## 2019-09-27 RX ADMIN — SERTRALINE 25 MILLIGRAM(S): 25 TABLET, FILM COATED ORAL at 00:51

## 2019-09-27 RX ADMIN — CLOPIDOGREL BISULFATE 75 MILLIGRAM(S): 75 TABLET, FILM COATED ORAL at 12:57

## 2019-09-27 RX ADMIN — SERTRALINE 25 MILLIGRAM(S): 25 TABLET, FILM COATED ORAL at 21:00

## 2019-09-27 RX ADMIN — CEFTRIAXONE 100 MILLIGRAM(S): 500 INJECTION, POWDER, FOR SOLUTION INTRAMUSCULAR; INTRAVENOUS at 00:51

## 2019-09-27 RX ADMIN — Medication 3 MILLILITER(S): at 08:20

## 2019-09-27 RX ADMIN — Medication 250 MILLIGRAM(S): at 17:41

## 2019-09-27 RX ADMIN — Medication 20 MILLIGRAM(S): at 05:48

## 2019-09-27 RX ADMIN — Medication 250 MILLIGRAM(S): at 05:48

## 2019-09-27 RX ADMIN — Medication 3 MILLILITER(S): at 14:23

## 2019-09-27 RX ADMIN — AZITHROMYCIN 500 MILLIGRAM(S): 500 TABLET, FILM COATED ORAL at 12:56

## 2019-09-27 RX ADMIN — ATENOLOL 25 MILLIGRAM(S): 25 TABLET ORAL at 05:47

## 2019-09-27 RX ADMIN — ENOXAPARIN SODIUM 40 MILLIGRAM(S): 100 INJECTION SUBCUTANEOUS at 12:56

## 2019-09-27 RX ADMIN — Medication 40 MILLIGRAM(S): at 17:40

## 2019-09-27 NOTE — PROGRESS NOTE ADULT - SUBJECTIVE AND OBJECTIVE BOX
CHIEF COMPLAINT/INTERVAL HISTORY:    Patient is a 86y old  Female who presents with a chief complaint of SOB (27 Sep 2019 09:11)    SUBJECTIVE & OBJECTIVE: Pt seen and examined at bedside. No overnight events. However, patient reports feeling short of breath with exertion this morning. Reports feeling better this afternoon. Blood cultures pending. Switch to PO abx in 24 hours if blood cultures negative. TTE discontinued per cardio.    ROS: No chest pain, palpitations, light headedness, dizziness, headache, nausea/vomiting, fevers/chills, abdominal pain, dysuria or increased urinary frequency.    ICU Vital Signs Last 24 Hrs  T(C): 36.8 (27 Sep 2019 08:52), Max: 36.8 (26 Sep 2019 23:00)  T(F): 98.2 (27 Sep 2019 08:52), Max: 98.2 (26 Sep 2019 23:00)  HR: 71 (27 Sep 2019 14:23) (66 - 95)  BP: 153/63 (27 Sep 2019 08:52) (118/62 - 153/63)  RR: 18 (27 Sep 2019 08:52) (18 - 18)  SpO2: 96% (27 Sep 2019 14:23) (90% - 96%)    MEDICATIONS  (STANDING):  ALBUTerol/ipratropium for Nebulization 3 milliLiter(s) Nebulizer every 6 hours  aspirin enteric coated 81 milliGRAM(s) Oral daily  ATENolol  Tablet 25 milliGRAM(s) Oral daily  azithromycin   Tablet 500 milliGRAM(s) Oral daily  cefTRIAXone   IVPB 1000 milliGRAM(s) IV Intermittent every 24 hours  clopidogrel Tablet 75 milliGRAM(s) Oral daily  enoxaparin Injectable 40 milliGRAM(s) SubCutaneous daily  furosemide    Tablet 20 milliGRAM(s) Oral daily  saccharomyces boulardii 250 milliGRAM(s) Oral two times a day  sertraline 25 milliGRAM(s) Oral daily  simvastatin 20 milliGRAM(s) Oral at bedtime    MEDICATIONS  (PRN):  acetaminophen   Tablet .. 650 milliGRAM(s) Oral every 6 hours PRN Temp greater or equal to 38C (100.4F), Mild Pain (1 - 3)  guaiFENesin   Syrup  (Sugar-Free) 100 milliGRAM(s) Oral every 6 hours PRN Cough      LABS:                        11.1   6.47  )-----------( 169      ( 27 Sep 2019 07:11 )             35.9     09-27    142  |  103  |  19.0  ----------------------------<  107  3.6   |  29.0  |  0.72    Ca    8.9      27 Sep 2019 07:11  Phos  3.3     09-27  Mg     1.9     09-27      PHYSICAL EXAM:    GENERAL: elderly female, sitting in bed, NAD  HEAD:  Atraumatic, Normocephalic  EYES: EOMI, PERRLA, conjunctiva and sclera clear  ENMT: Moist mucous membranes  NECK: Supple   NERVOUS SYSTEM:  Alert & Oriented X3   CHEST/LUNG: coarse breath sounds, no rales or wheezing  HEART: Regular rate and rhythm; + S1/S2, + Murmur  ABDOMEN: Soft, Nontender, Nondistended; Bowel sounds present  EXTREMITIES:  no pedal edema

## 2019-09-27 NOTE — PROGRESS NOTE ADULT - ASSESSMENT
Patient is a 86 year old female with PMH of COPD on home O2, AS s/p TAVR, HTN, CAD, Depression and HLD who presented to the ED with worsening exertional dyspnea.     1. Acute on Chronic Hypoxic Respiratory Failure likely due to Community Acquired PNA (suspected gram positive and gram negative organism)  -f/u blood cultures  -RVP negative  -urine legionella negative  -continue ceftriaxone and azithromycin; switch to PO abx in 24 hours if blood cultures negative  -repeat lactate WNL  -BNP 7700 but clinically no evidence of decompensated heart failure, continue current dose of lasix  -CT chest with bilateral infiltrates   -procal 0.2  -supplemental O2 and bronchodilators as needed  -short course of steroids today  -cardio consult appreciated; per Dr. Olmstead no indication for repeat TTE (last TTE was completed in August 2019)    2. AS s/p TAVR   -in March 2019  -cardio on board    3. HTN  -BP stable, continue home medications  -low sodium diet     4. HLD  -check lipid panel  -continue statin    5. Depression  -continue sertraline    DVT ppx - Lovenox SC    Dispo - Tentative plans for discharge in 24 hours pending repeat blood cultures. PT - home. Patient has home O2.        Attending Attestation:   Plan discussed with patient

## 2019-09-27 NOTE — PROGRESS NOTE ADULT - SUBJECTIVE AND OBJECTIVE BOX
[facility], [facility]                                   [address] [place] [address]                                                      PHONE: [phone]                                                         FAX: [phone]  [email or web address]  ---------------------------------------------------------------------------------------------------------------------------------    Reason for Consult: SOB     HPI:  EL SHER is an 86y Female with history of ex smoker COPD HTN HLD prior [facility] 2018 none obstructive CAD severe AS s/p TAVR at [facility] 3/2019 on DAPT who presented to the ED with worsening exertional dyspnea. Patient reports she generally has a degree of dyspnea with moderate exertion, but over the past few days she has experienced dyspnea with minimal exertion. States the shortness of breath is associated with a mild productive cough, and then she developed a fever of 102 last night with chills. Also, reports bilateral rib pain which lasted a few minutes and has resolved.  Denies dizziness, lightheadedness, chest pain, nausea, vomiting, abdominal pain or diarrhea. Denies sick contacts or recent travel. In the ED, patient was also noted to be febrile with leukocytosis. CXR was clear, but patient was presumed to have pneumonia and started on empiric antibiotics in which CT of the chest showed bi      TTE from 2019    LV NL 60-65%  s/p TAVR MILD AI  SMALL PE  No tamponade      PAST MEDICAL & SURGICAL HISTORY:  Liver, polycystic  Pericardial effusion  Mitral valve insufficiency  Aortic stenosis  Aortic valve insufficiency  Smoker unmotivated to quit  Humerus fracture, right, sequela: residual weakness  Left ventricular outflow tract obstruction  SOB (shortness of breath)  BENITEZ (dyspnea on exertion)  Unstable angina  Arthritis: osteoarthritis  Hiatal hernia  Cataract  HTN (hypertension)  HLD (hyperlipidemia)  COPD (chronic obstructive pulmonary disease)  Macular degeneration of both eyes  History of cholecystectomy  History of coronary angiogram: dr saenz  [facility]  2016  History of tonsillectomy  Liver cyst: surgical removal of multiple liver cysts  Anal fistula  Ectopic pregnancy  History of appendectomy  H/O shoulder replacement: right      penicillin (Rash)  sulfur hexafluoride (Anaphylaxis; Rash)      MEDICATIONS  (STANDING):  ALBUTerol/ipratropium for Nebulization 3 milliLiter(s) Nebulizer every 6 hours  aspirin enteric coated 81 milliGRAM(s) Oral daily  ATENolol  Tablet 25 milliGRAM(s) Oral daily  azithromycin   Tablet 500 milliGRAM(s) Oral daily  cefTRIAXone   IVPB 1000 milliGRAM(s) IV Intermittent every 24 hours  clopidogrel Tablet 75 milliGRAM(s) Oral daily  enoxaparin Injectable 40 milliGRAM(s) SubCutaneous daily  furosemide    Tablet 20 milliGRAM(s) Oral daily  sertraline 25 milliGRAM(s) Oral daily  simvastatin 20 milliGRAM(s) Oral at bedtime    MEDICATIONS  (PRN):  acetaminophen   Tablet .. 650 milliGRAM(s) Oral every 6 hours PRN Temp greater or equal to 38C (100.4F), Mild Pain (1 - 3)  guaiFENesin   Syrup  (Sugar-Free) 100 milliGRAM(s) Oral every 6 hours PRN Cough      Social History:  Cigarettes:          ex smoker          Alchohol:         none         Illicit Drug Abuse:  none     FH negative for CAD     ROS: Negative other than as mentioned in HPI.    Vital Signs Last 24 Hrs  T(C): 36.8 (26 Sep 2019 07:40), Max: 38.7 (25 Sep 2019 11:13)  T(F): 98.3 (26 Sep 2019 07:40), Max: 101.7 (25 Sep 2019 11:13)  HR: 61 (26 Sep 2019 07:40) (61 - 92)  BP: 136/64 (26 Sep 2019 07:40) (120/52 - 186/80)  BP(mean): --  RR: 20 (26 Sep 2019 04:49) (20 - 26)  SpO2: 100% (26 Sep 2019 07:40) (93% - 100%)  ICU Vital Signs Last 24 Hrs  EL SHER  I&O's Detail    I&O's Summary    Drug Dosing Weight  EL SHER      PHYSICAL EXAM:  General: Appears well developed, well nourished alert and cooperative.  HEENT: Head; normocephalic, atraumatic.  Eyes: Pupils reactive, cornea wnl.  Neck: Supple, no nodes adenopathy, no NVD or carotid bruit or thyromegaly.  CARDIOVASCULAR: Normal S1 and S2,  murmur, rub, gallop or lift. /  LUNGS: Decrease BS B/L at the lower bases   ABDOMEN: Soft, nontender without mass or organomegaly. bowel sounds normoactive.  EXTREMITIES: No clubbing, cyanosis or edema. Distal pulses wnl.   SKIN: warm and dry with normal turgor.  NEURO: Alert/oriented x 3/normal motor exam. No pathologic reflexes.    PSYCH: normal affect.        LABS:                        10.9   12.63 )-----------( 167      ( 26 Sep 2019 06:13 )             34.1         142  |  103  |  14.0  ----------------------------<  107  4.0   |  29.0  |  0.70    Ca    9.2      26 Sep 2019 06:13  Phos  2.9       Mg     1.9         TPro  7.4  /  Alb  3.9  /  TBili  0.7  /  DBili  x   /  AST  21  /  ALT  7   /  AlkPhos  69      EL NIKKY  CARDIAC MARKERS ( 25 Sep 2019 16:10 )  x     / <0.01 ng/mL / x     / x     / x          PT/INR - ( 25 Sep 2019 11:49 )   PT: 12.8 sec;   INR: 1.11 ratio         PTT - ( 25 Sep 2019 11:49 )  PTT:34.1 sec  Urinalysis Basic - ( 25 Sep 2019 13:33 )    Color: Yellow / Appearance: Clear / S.010 / pH: x  Gluc: x / Ketone: Negative  / Bili: Negative / Urobili: 1 mg/dL   Blood: x / Protein: 15 mg/dL / Nitrite: Negative   Leuk Esterase: Negative / RBC: x / WBC 3-5   Sq Epi: x / Non Sq Epi: Occasional / Bacteria: x        RADIOLOGY & ADDITIONAL STUDIES:    INTERPRETATION OF TELEMETRY (personally reviewed):    ECG: NSR LVH with repol unchanged from prior     ECHO: pending       CARDIAC CATHETERIZATION: 2018  DIAGNOSTIC IMPRESSIONS: Severe pulmonary HTN w/ PCW 20's.  Hyperdynamic LV w/ LVH.  No sig AS.  Moderate LCx and mild LAD disease.  DIAGNOSTIC RECOMMENDATIONS: Medical therapy.  Followup 2-4 weeks at [facility].  INTERVENTIONAL IMPRESSIONS: Severe pulmonary HTN w/ PCW 20's.  Hyperdynamic LV w/ LVH.  No sig AS.  Moderate LCx and mild LAD disease.  INTERVENTIONAL RECOMMENDATIONS: Medical therapy.  Followup 2-4 weeks at [facility].        TAVR at [facility] 3/2019    CT of the chest   IMPRESSION: New bibasilar infiltrates and new minimal left pleural   effusion  Stable small pericardial effusion  Stable hepaticcysts  Stable left adrenal adenoma  Emphysema again noted      Assessment a/nd Plan:  In summary, EL SHER is an 86y Female with past medical history significant for ex smoker COPD home O2 prior cardiac work up [facility] 2018 none obstructive CAD severe AS s/p TAVR at [facility] 3/2019 on DAPT HFpEF low dose Lasix who presented to the ED with worsening exertional dyspnea. Patient reports she generally has a degree of dyspnea with moderate exertion, but over the past few days she has experienced dyspnea with minimal exertion. States the shortness of breath is associated with a mild productive cough, and then she developed a fever of 102 last night with chills. Also, reports bilateral rib pain which lasted a few minutes and has resolved.  Denies dizziness, lightheadedness, chest pain, nausea, vomiting, abdominal pain or diarrhea. Denies sick contacts or recent travel. In the ED, patient was also noted to be febrile with leukocytosis. CXR was clear, but patient was presumed to have pneumonia and started on empiric antibiotics in which CT of the chest showed bibasilar infiltrates; NO evidence of ACS or decompensated heart failure     Plan    1.  IV ATB therapy  2.  Continue DAPT and other CV medications   3. Will FUP at [place] [facility] in a few weeks

## 2019-09-28 PROCEDURE — 99233 SBSQ HOSP IP/OBS HIGH 50: CPT

## 2019-09-28 RX ADMIN — SERTRALINE 25 MILLIGRAM(S): 25 TABLET, FILM COATED ORAL at 22:14

## 2019-09-28 RX ADMIN — Medication 81 MILLIGRAM(S): at 12:12

## 2019-09-28 RX ADMIN — Medication 250 MILLIGRAM(S): at 18:42

## 2019-09-28 RX ADMIN — ATENOLOL 25 MILLIGRAM(S): 25 TABLET ORAL at 05:33

## 2019-09-28 RX ADMIN — CEFTRIAXONE 100 MILLIGRAM(S): 500 INJECTION, POWDER, FOR SOLUTION INTRAMUSCULAR; INTRAVENOUS at 22:14

## 2019-09-28 RX ADMIN — Medication 250 MILLIGRAM(S): at 05:33

## 2019-09-28 RX ADMIN — ENOXAPARIN SODIUM 40 MILLIGRAM(S): 100 INJECTION SUBCUTANEOUS at 12:08

## 2019-09-28 RX ADMIN — Medication 3 MILLILITER(S): at 08:10

## 2019-09-28 RX ADMIN — Medication 100 MILLIGRAM(S): at 12:08

## 2019-09-28 RX ADMIN — Medication 20 MILLIGRAM(S): at 05:33

## 2019-09-28 RX ADMIN — SIMVASTATIN 20 MILLIGRAM(S): 20 TABLET, FILM COATED ORAL at 22:14

## 2019-09-28 RX ADMIN — Medication 3 MILLILITER(S): at 20:46

## 2019-09-28 RX ADMIN — CLOPIDOGREL BISULFATE 75 MILLIGRAM(S): 75 TABLET, FILM COATED ORAL at 12:08

## 2019-09-28 RX ADMIN — Medication 40 MILLIGRAM(S): at 05:52

## 2019-09-28 RX ADMIN — AZITHROMYCIN 500 MILLIGRAM(S): 500 TABLET, FILM COATED ORAL at 12:08

## 2019-09-28 RX ADMIN — Medication 3 MILLILITER(S): at 15:32

## 2019-09-28 NOTE — PROGRESS NOTE ADULT - SUBJECTIVE AND OBJECTIVE BOX
Patient: EL SHER [id number] 86y Female                           Internal Medicine Hospitalist Progress Note    Interval History:  Seen with daughter at bedside.  Reports SOB and wheezing earlier, requiring increased FIO2 due to hypoxia.  Now feeling much better.  No fever/ chills.  No additional complaints.     ____________________PHYSICAL EXAM:  Vitals reviewed as indicated below  GENERAL:  NAD Alert and Oriented x 3 pleasant  HEENT: NCAT  CARDIOVASCULAR:  S1, S2  LUNGS: coarse bs b/l, no wheezing.   ABDOMEN:  soft, (-) tenderness, (-) distension, (+) bowel sounds, (-) guarding, (-) rebound (-) rigidity  EXTREMITIES:  no cyanosis / clubbing / edema.   ____________________      BACKGROUND:  HEALTH ISSUES - PROBLEM Dx:        Allergies    penicillin (Rash)  sulfur hexafluoride (Anaphylaxis; Rash)    Intolerances      PAST MEDICAL & SURGICAL HISTORY:  Liver, polycystic  Pericardial effusion  Mitral valve insufficiency  Aortic stenosis  Aortic valve insufficiency  Smoker unmotivated to quit  Humerus fracture, right, sequela: residual weakness  Left ventricular outflow tract obstruction  SOB (shortness of breath)  BENITEZ (dyspnea on exertion)  Unstable angina  Arthritis: osteoarthritis  Hiatal hernia  Cataract  HTN (hypertension)  HLD (hyperlipidemia)  COPD (chronic obstructive pulmonary disease)  Macular degeneration of both eyes  History of cholecystectomy  History of coronary angiogram: dr saenz  [facility]  2016  History of tonsillectomy  Liver cyst: surgical removal of multiple liver cysts  Anal fistula  Ectopic pregnancy  History of appendectomy  H/O shoulder replacement: right        VITALS:  Vital Signs Last 24 Hrs  T(C): 36.7 (28 Sep 2019 08:25), Max: 36.7 (28 Sep 2019 08:25)  T(F): 98 (28 Sep 2019 08:25), Max: 98 (28 Sep 2019 08:25)  HR: 73 (28 Sep 2019 15:33) (73 - 95)  BP: 147/75 (28 Sep 2019 08:25) (119/65 - 147/75)  BP(mean): --  RR: 18 (28 Sep 2019 08:25) (18 - 18)  SpO2: 97% (28 Sep 2019 15:33) (95% - 99%) Daily     Daily   CAPILLARY BLOOD GLUCOSE        I&O's Summary    27 Sep 2019 07:01  -  28 Sep 2019 07:00  --------------------------------------------------------  IN: 240 mL / OUT: 0 mL / NET: 240 mL    28 Sep 2019 07:01  -  28 Sep 2019 15:57  --------------------------------------------------------  IN: 480 mL / OUT: 0 mL / NET: 480 mL          LABS:                        11.1   6.47  )-----------( 169      ( 27 Sep 2019 07:11 )             35.9     09-27    142  |  103  |  19.0  ----------------------------<  107  3.6   |  29.0  |  0.72    Ca    8.9      27 Sep 2019 07:11  Phos  3.3     09-27  Mg     1.9     09-27                    MEDICATIONS:  MEDICATIONS  (STANDING):  ALBUTerol/ipratropium for Nebulization 3 milliLiter(s) Nebulizer every 6 hours  aspirin enteric coated 81 milliGRAM(s) Oral daily  ATENolol  Tablet 25 milliGRAM(s) Oral daily  azithromycin   Tablet 500 milliGRAM(s) Oral daily  cefTRIAXone   IVPB 1000 milliGRAM(s) IV Intermittent every 24 hours  clopidogrel Tablet 75 milliGRAM(s) Oral daily  enoxaparin Injectable 40 milliGRAM(s) SubCutaneous daily  furosemide    Tablet 20 milliGRAM(s) Oral daily  methylPREDNISolone sodium succinate Injectable 40 milliGRAM(s) IV Push every 12 hours  saccharomyces boulardii 250 milliGRAM(s) Oral two times a day  sertraline 25 milliGRAM(s) Oral daily  simvastatin 20 milliGRAM(s) Oral at bedtime    MEDICATIONS  (PRN):  acetaminophen   Tablet .. 650 milliGRAM(s) Oral every 6 hours PRN Temp greater or equal to 38C (100.4F), Mild Pain (1 - 3)  guaiFENesin   Syrup  (Sugar-Free) 100 milliGRAM(s) Oral every 6 hours PRN Cough

## 2019-09-28 NOTE — PROGRESS NOTE ADULT - ASSESSMENT
Patient is a 86 year old female with PMH of COPD on home O2, AS s/p TAVR, HTN, CAD, Depression and HLD who presented to the ED with worsening exertional dyspnea, admitted for pneumonia.     1. Acute on Chronic Hypoxic Respiratory Failure likely due to Community Acquired PNA (suspected gram positive and gram negative organism)  -f/u blood cultures  -RVP negative  -urine legionella negative  -continue ceftriaxone and azithromycin; switch to PO abx in 24 hours if blood cultures negative  -repeat lactate WNL  -BNP 7700 but clinically no evidence of decompensated heart failure, continue current dose of lasix  -CT chest with bilateral infiltrates   -procal 0.2  -supplemental O2 and bronchodilators as needed  - taper to po steroids.     2. AS s/p TAVR   -in March 2019  -cardio on board    3. HTN  -BP stable, continue home medications  -low sodium diet     4. HLD  -check lipid panel  -continue statin    5. Depression  -continue sertraline    DVT ppx - Lovenox SC  Hold discharge due to worsened hypoxia.

## 2019-09-29 LAB
ANION GAP SERPL CALC-SCNC: 12 MMOL/L — SIGNIFICANT CHANGE UP (ref 5–17)
BUN SERPL-MCNC: 16 MG/DL — SIGNIFICANT CHANGE UP (ref 8–20)
CALCIUM SERPL-MCNC: 8.9 MG/DL — SIGNIFICANT CHANGE UP (ref 8.6–10.2)
CHLORIDE SERPL-SCNC: 103 MMOL/L — SIGNIFICANT CHANGE UP (ref 98–107)
CO2 SERPL-SCNC: 31 MMOL/L — HIGH (ref 22–29)
CREAT SERPL-MCNC: 0.58 MG/DL — SIGNIFICANT CHANGE UP (ref 0.5–1.3)
GLUCOSE SERPL-MCNC: 81 MG/DL — SIGNIFICANT CHANGE UP (ref 70–115)
HCT VFR BLD CALC: 34.3 % — LOW (ref 34.5–45)
HGB BLD-MCNC: 10.8 G/DL — LOW (ref 11.5–15.5)
MCHC RBC-ENTMCNC: 29 PG — SIGNIFICANT CHANGE UP (ref 27–34)
MCHC RBC-ENTMCNC: 31.5 GM/DL — LOW (ref 32–36)
MCV RBC AUTO: 92 FL — SIGNIFICANT CHANGE UP (ref 80–100)
PLATELET # BLD AUTO: 197 K/UL — SIGNIFICANT CHANGE UP (ref 150–400)
POTASSIUM SERPL-MCNC: 3.7 MMOL/L — SIGNIFICANT CHANGE UP (ref 3.5–5.3)
POTASSIUM SERPL-SCNC: 3.7 MMOL/L — SIGNIFICANT CHANGE UP (ref 3.5–5.3)
RBC # BLD: 3.73 M/UL — LOW (ref 3.8–5.2)
RBC # FLD: 15 % — HIGH (ref 10.3–14.5)
SODIUM SERPL-SCNC: 146 MMOL/L — HIGH (ref 135–145)
WBC # BLD: 5.79 K/UL — SIGNIFICANT CHANGE UP (ref 3.8–10.5)
WBC # FLD AUTO: 5.79 K/UL — SIGNIFICANT CHANGE UP (ref 3.8–10.5)

## 2019-09-29 PROCEDURE — 99233 SBSQ HOSP IP/OBS HIGH 50: CPT

## 2019-09-29 RX ADMIN — Medication 3 MILLILITER(S): at 15:19

## 2019-09-29 RX ADMIN — Medication 81 MILLIGRAM(S): at 11:15

## 2019-09-29 RX ADMIN — Medication 20 MILLIGRAM(S): at 05:46

## 2019-09-29 RX ADMIN — SIMVASTATIN 20 MILLIGRAM(S): 20 TABLET, FILM COATED ORAL at 21:09

## 2019-09-29 RX ADMIN — Medication 250 MILLIGRAM(S): at 18:07

## 2019-09-29 RX ADMIN — Medication 3 MILLILITER(S): at 08:23

## 2019-09-29 RX ADMIN — Medication 250 MILLIGRAM(S): at 05:45

## 2019-09-29 RX ADMIN — Medication 40 MILLIGRAM(S): at 05:46

## 2019-09-29 RX ADMIN — SERTRALINE 25 MILLIGRAM(S): 25 TABLET, FILM COATED ORAL at 21:08

## 2019-09-29 RX ADMIN — AZITHROMYCIN 500 MILLIGRAM(S): 500 TABLET, FILM COATED ORAL at 11:14

## 2019-09-29 RX ADMIN — ENOXAPARIN SODIUM 40 MILLIGRAM(S): 100 INJECTION SUBCUTANEOUS at 11:15

## 2019-09-29 RX ADMIN — CEFTRIAXONE 100 MILLIGRAM(S): 500 INJECTION, POWDER, FOR SOLUTION INTRAMUSCULAR; INTRAVENOUS at 21:09

## 2019-09-29 RX ADMIN — Medication 3 MILLILITER(S): at 20:33

## 2019-09-29 RX ADMIN — Medication 3 MILLILITER(S): at 02:34

## 2019-09-29 RX ADMIN — CLOPIDOGREL BISULFATE 75 MILLIGRAM(S): 75 TABLET, FILM COATED ORAL at 11:14

## 2019-09-29 RX ADMIN — ATENOLOL 25 MILLIGRAM(S): 25 TABLET ORAL at 05:46

## 2019-09-29 NOTE — PROGRESS NOTE ADULT - ASSESSMENT
Patient is a 86 year old female with PMH of COPD on home O2, AS s/p TAVR, HTN, CAD, Depression and HLD who presented to the ED with worsening exertional dyspnea, admitted for pneumonia.     > Acute on Chronic Hypoxic Respiratory Failure likely due to Community Acquired PNA (suspected gram positive and gram negative organism)  -continue current antibiotics.  Transition to po antibiotics on discharge.  STill requiring increased FIO2 (at 3l/min nc with SaO2 88% with activity at times).   > COPD with exacerbation - symptoms improving.  Tapered to po steroids.  Continue nebulizers.  Outpatient f/u with pulmonary.    > AS s/p TAVR - Cardiology following  > HTN -BP stable, continue home medications -low sodium diet   > Hyperlipidemia - diet modification.  Continue Statin.   > Depression continue sertraline    DVT ppx - Lovenox SC  Hold discharge due to worsened hypoxia.  She does not want THOMAS placement

## 2019-09-29 NOTE — PROGRESS NOTE ADULT - SUBJECTIVE AND OBJECTIVE BOX
Patient: EL SHER [id number] 86y Female                           Internal Medicine Hospitalist Progress Note    Interval History:  Seen with daughter at bedside.  Reports some SOB while ambulating to commode.   After nebulizers, now feeling much better.  No fever/ chills.  No additional complaints.     ____________________PHYSICAL EXAM:  Vitals reviewed as indicated below  GENERAL:  NAD Alert and Oriented x 3 pleasant  HEENT: NCAT  CARDIOVASCULAR:  S1, S2  LUNGS: coarse bs b/l, no wheezing.   ABDOMEN:  soft, (-) tenderness, (-) distension, (+) bowel sounds, (-) guarding, (-) rebound (-) rigidity  EXTREMITIES:  no cyanosis / clubbing / edema.   ____________________    VITALS:  Vital Signs Last 24 Hrs  T(C): 36.7 (29 Sep 2019 08:12), Max: 36.8 (28 Sep 2019 15:41)  T(F): 98.1 (29 Sep 2019 08:12), Max: 98.2 (28 Sep 2019 15:41)  HR: 77 (29 Sep 2019 08:24) (73 - 89)  BP: 153/79 (29 Sep 2019 08:12) (121/64 - 153/79)  BP(mean): --  RR: 18 (29 Sep 2019 08:12) (18 - 20)  SpO2: 96% (29 Sep 2019 08:24) (93% - 99%) Daily     Daily   CAPILLARY BLOOD GLUCOSE        I&O's Summary    28 Sep 2019 07:01  -  29 Sep 2019 07:00  --------------------------------------------------------  IN: 480 mL / OUT: 0 mL / NET: 480 mL    29 Sep 2019 07:01  -  29 Sep 2019 14:55  --------------------------------------------------------  IN: 540 mL / OUT: 0 mL / NET: 540 mL        LABS:                        10.8   5.79  )-----------( 197      ( 29 Sep 2019 06:50 )             34.3     09-29    146<H>  |  103  |  16.0  ----------------------------<  81  3.7   |  31.0<H>  |  0.58    Ca    8.9      29 Sep 2019 06:50                    MEDICATIONS:  acetaminophen   Tablet .. 650 milliGRAM(s) Oral every 6 hours PRN  ALBUTerol/ipratropium for Nebulization 3 milliLiter(s) Nebulizer every 6 hours  aspirin enteric coated 81 milliGRAM(s) Oral daily  ATENolol  Tablet 25 milliGRAM(s) Oral daily  azithromycin   Tablet 500 milliGRAM(s) Oral daily  cefTRIAXone   IVPB 1000 milliGRAM(s) IV Intermittent every 24 hours  clopidogrel Tablet 75 milliGRAM(s) Oral daily  enoxaparin Injectable 40 milliGRAM(s) SubCutaneous daily  furosemide    Tablet 20 milliGRAM(s) Oral daily  guaiFENesin   Syrup  (Sugar-Free) 100 milliGRAM(s) Oral every 6 hours PRN  predniSONE   Tablet 40 milliGRAM(s) Oral daily  saccharomyces boulardii 250 milliGRAM(s) Oral two times a day  sertraline 25 milliGRAM(s) Oral daily  simvastatin 20 milliGRAM(s) Oral at bedtime

## 2019-09-30 ENCOUNTER — TRANSCRIPTION ENCOUNTER (OUTPATIENT)
Age: 84
End: 2019-09-30

## 2019-09-30 PROCEDURE — 99222 1ST HOSP IP/OBS MODERATE 55: CPT

## 2019-09-30 PROCEDURE — 99232 SBSQ HOSP IP/OBS MODERATE 35: CPT

## 2019-09-30 RX ADMIN — ENOXAPARIN SODIUM 40 MILLIGRAM(S): 100 INJECTION SUBCUTANEOUS at 11:45

## 2019-09-30 RX ADMIN — Medication 3 MILLILITER(S): at 20:18

## 2019-09-30 RX ADMIN — AZITHROMYCIN 500 MILLIGRAM(S): 500 TABLET, FILM COATED ORAL at 11:44

## 2019-09-30 RX ADMIN — Medication 250 MILLIGRAM(S): at 17:13

## 2019-09-30 RX ADMIN — Medication 3 MILLILITER(S): at 08:58

## 2019-09-30 RX ADMIN — Medication 81 MILLIGRAM(S): at 11:44

## 2019-09-30 RX ADMIN — SERTRALINE 25 MILLIGRAM(S): 25 TABLET, FILM COATED ORAL at 21:27

## 2019-09-30 RX ADMIN — Medication 20 MILLIGRAM(S): at 05:41

## 2019-09-30 RX ADMIN — Medication 250 MILLIGRAM(S): at 05:41

## 2019-09-30 RX ADMIN — Medication 3 MILLILITER(S): at 15:17

## 2019-09-30 RX ADMIN — CLOPIDOGREL BISULFATE 75 MILLIGRAM(S): 75 TABLET, FILM COATED ORAL at 11:44

## 2019-09-30 RX ADMIN — ATENOLOL 25 MILLIGRAM(S): 25 TABLET ORAL at 05:41

## 2019-09-30 RX ADMIN — SIMVASTATIN 20 MILLIGRAM(S): 20 TABLET, FILM COATED ORAL at 21:27

## 2019-09-30 RX ADMIN — Medication 40 MILLIGRAM(S): at 05:41

## 2019-09-30 NOTE — PROGRESS NOTE ADULT - ASSESSMENT
Patient is a 86 year old female with PMH of COPD on home O2, AS s/p TAVR, HTN, CAD, Depression and HLD who presented to the ED with worsening exertional dyspnea, admitted for pneumonia.     > Acute on Chronic Hypoxic Respiratory Failure likely due to Community Acquired PNA (suspected gram positive and gram negative organism)  -continue current antibiotics.  Transition to po antibiotics on discharge.  STill requiring increased FIO2 (at 3l/min nc with SaO2 88% with activity at times).  Continue O2.    > COPD with exacerbation - symptoms improving.  Tapered to po steroids.  Continue nebulizers.  Pulmonary evaluation d/w Dr. Mejia.  > AS s/p TAVR - Cardiology following  > HTN -BP stable, continue home medications -low sodium diet   > Hyperlipidemia - diet modification.  Continue Statin.   > Depression continue sertraline    DVT ppx - Lovenox SC

## 2019-09-30 NOTE — CONSULT NOTE ADULT - ASSESSMENT
severe COPD, home 02 depd  multilobar infiltrates, bronchiectasis, mucus plugging  improved, blood  cultures, flu . Legionella are negative  mobilize  pred taper, mucinex  veal for possible d/c am
86 yr old female Htn, COPD, on PRN oxygen, TAVR in march 2019, presents with fever and chills yesterday and cough and SOB and phlegm for past couple days- possible pneumonia Rocephin/ Zithromax follow cultures ]  COPD- cont Duoneb  Lactate is high got fluids in Ed - check repeat , hold Lasix  Mood disorder - cont Sertraline  DVT PPX- Lovenox

## 2019-09-30 NOTE — DIETITIAN INITIAL EVALUATION ADULT. - OTHER INFO
Patient is a 86 year old female with PMH of COPD on home O2, AS s/p TAVR, HTN, CAD, Depression and HLD who presented to the ED with worsening exertional dyspnea. Patient states she also noted her legs appeared to be more swollen than usual. Pt reported 10# weight loss over last year, poor po intake currently as per pt. Pt states she cannot eat breakfast lately due to trouble breathing. Pt wears dentures, food preferences obtained. Pt agreeable to try one can Ensure daily.

## 2019-09-30 NOTE — CONSULT NOTE ADULT - SUBJECTIVE AND OBJECTIVE BOX
PULMONARY CONSULT NOTE      LEANNE SHER-[id number]    Patient is a 86y old  Female who presents with a chief complaint of SOB (30 Sep 2019 12:21)  EL SHER is an 86y Female with history of ex smoker COPD HTN HLD prior [facility] 4/2018 none obstructive CAD severe AS s/p TAVR at [facility] 3/2019 on DAPT who presented to the ED with worsening exertional dyspnea. Patient reports she generally has a degree of dyspnea with moderate exertion, but over the past few days she has experienced dyspnea with minimal exertion. States the shortness of breath is associated with a mild productive cough, and then she developed a fever of 102 last night with chills. Also, reports bilateral rib pain which lasted a few minutes and has resolved.  Denies dizziness, lightheadedness, chest pain, nausea, vomiting, abdominal pain or diarrhea. Denies sick contacts or recent travel. In the ED, patient was also noted to be febrile with leukocytosis. CXR was clear, but patient was presumed to have pneumonia and started on empiric antibiotics     HISTORY OF PRESENT ILLNESS:  feels better overall   decreased cough , dyspnea   appetite ok, doesnt like the food  no sick contacts  uses anoro, nebs, home 02 at home  stable pericardial eff, post TAVR  MEDICATIONS  (STANDING):  ALBUTerol/ipratropium for Nebulization 3 milliLiter(s) Nebulizer every 6 hours  aspirin enteric coated 81 milliGRAM(s) Oral daily  ATENolol  Tablet 25 milliGRAM(s) Oral daily  azithromycin   Tablet 500 milliGRAM(s) Oral daily  clopidogrel Tablet 75 milliGRAM(s) Oral daily  enoxaparin Injectable 40 milliGRAM(s) SubCutaneous daily  furosemide    Tablet 20 milliGRAM(s) Oral daily  predniSONE   Tablet 40 milliGRAM(s) Oral daily  saccharomyces boulardii 250 milliGRAM(s) Oral two times a day  sertraline 25 milliGRAM(s) Oral daily  simvastatin 20 milliGRAM(s) Oral at bedtime      MEDICATIONS  (PRN):  acetaminophen   Tablet .. 650 milliGRAM(s) Oral every 6 hours PRN Temp greater or equal to 38C (100.4F), Mild Pain (1 - 3)  guaiFENesin   Syrup  (Sugar-Free) 100 milliGRAM(s) Oral every 6 hours PRN Cough      Allergies    penicillin (Rash)  sulfur hexafluoride (Anaphylaxis; Rash)    Intolerances    provide chopped veggies, likes over easy egg in am, chic soup L&amp;D, likes soft foods (Unknown)      PAST MEDICAL & SURGICAL HISTORY:  Liver, polycystic  Pericardial effusion  Mitral valve insufficiency  Aortic stenosis  Aortic valve insufficiency  Smoker unmotivated to quit  Humerus fracture, right, sequela: residual weakness  Left ventricular outflow tract obstruction  SOB (shortness of breath)  BENITEZ (dyspnea on exertion)  Unstable angina  Arthritis: osteoarthritis  Hiatal hernia  Cataract  HTN (hypertension)  HLD (hyperlipidemia)  COPD (chronic obstructive pulmonary disease)  Macular degeneration of both eyes  History of cholecystectomy  History of coronary angiogram: dr saenz  [facility]  2016  History of tonsillectomy  Liver cyst: surgical removal of multiple liver cysts  Anal fistula  Ectopic pregnancy  History of appendectomy  H/O shoulder replacement: right      FAMILY HISTORY:  Family history of MI (myocardial infarction)      SOCIAL HISTORY  Smoking History:     REVIEW OF SYSTEMS:    CONSTITUTIONAL:  No fevers, chills, sweats    HEENT:  Eyes:  No diplopia or blurred vision. ENT:  No earache, sore throat or runny nose.    CARDIOVASCULAR:  No pressure, squeezing, tightness, or heaviness about the chest; no palpitations.    RESPIRATORY:  see HPI    GASTROINTESTINAL:  No abdominal pain, nausea, vomiting or diarrhea.    GENITOURINARY:  No dysuria, frequency or urgency.    NEUROLOGIC:  No paresthesias, fasciculations, seizures or weakness.    PSYCHIATRIC:  No disorder of thought or mood.    Vital Signs Last 24 Hrs  T(C): 36.6 (30 Sep 2019 08:08), Max: 37.1 (29 Sep 2019 23:30)  T(F): 97.8 (30 Sep 2019 08:08), Max: 98.7 (29 Sep 2019 23:30)  HR: 74 (30 Sep 2019 15:20) (73 - 75)  BP: 152/78 (30 Sep 2019 08:08) (131/57 - 152/78)  BP(mean): --  RR: 18 (30 Sep 2019 08:08) (18 - 18)  SpO2: 99% (30 Sep 2019 15:20) (93% - 100%)    PHYSICAL EXAMINATION:    GENERAL: The patient is a well-developed, well-nourished _____in no apparent distress.     HEENT: Head is normocephalic and atraumatic. Extraocular muscles are intact. Mucous membranes are moist.     NECK: Supple.     LUNGS: moderate air entry without wheezing, rales, or rhonchi. Respirations unlabored    HEART: Regular rate and rhythm without murmur.    ABDOMEN: Soft, nontender, and nondistended.  No hepatosplenomegaly is noted.    EXTREMITIES: Without any cyanosis, clubbing, rash, lesions or edema.    NEUROLOGIC: Grossly intact.      LABS:                        10.8   5.79  )-----------( 197      ( 29 Sep 2019 06:50 )             34.3     09-29    146<H>  |  103  |  16.0  ----------------------------<  81  3.7   |  31.0<H>  |  0.58    Ca    8.9      29 Sep 2019 06:50                          MICROBIOLOGY:    RADIOLOGY & ADDITIONAL STUDIES:  CT scan reviewed and compared
[facility], [facility]                                   [address] [place] [address]                                                      PHONE: [phone]                                                         FAX: [phone]  [email or web address]  ---------------------------------------------------------------------------------------------------------------------------------    Reason for Consult: SOB     HPI:  EL SHER is an 86y Female with history of ex smoker COPD HTN HLD prior [facility] 2018 none obstructive CAD severe AS s/p TAVR at [facility] 3/2019 on DAPT who presented to the ED with worsening exertional dyspnea. Patient reports she generally has a degree of dyspnea with moderate exertion, but over the past few days she has experienced dyspnea with minimal exertion. States the shortness of breath is associated with a mild productive cough, and then she developed a fever of 102 last night with chills. Also, reports bilateral rib pain which lasted a few minutes and has resolved.  Denies dizziness, lightheadedness, chest pain, nausea, vomiting, abdominal pain or diarrhea. Denies sick contacts or recent travel. In the ED, patient was also noted to be febrile with leukocytosis. CXR was clear, but patient was presumed to have pneumonia and started on empiric antibiotics in which CT of the chest showed bi    PAST MEDICAL & SURGICAL HISTORY:  Liver, polycystic  Pericardial effusion  Mitral valve insufficiency  Aortic stenosis  Aortic valve insufficiency  Smoker unmotivated to quit  Humerus fracture, right, sequela: residual weakness  Left ventricular outflow tract obstruction  SOB (shortness of breath)  BENITEZ (dyspnea on exertion)  Unstable angina  Arthritis: osteoarthritis  Hiatal hernia  Cataract  HTN (hypertension)  HLD (hyperlipidemia)  COPD (chronic obstructive pulmonary disease)  Macular degeneration of both eyes  History of cholecystectomy  History of coronary angiogram: dr saenz  [facility]  2016  History of tonsillectomy  Liver cyst: surgical removal of multiple liver cysts  Anal fistula  Ectopic pregnancy  History of appendectomy  H/O shoulder replacement: right      penicillin (Rash)  sulfur hexafluoride (Anaphylaxis; Rash)      MEDICATIONS  (STANDING):  ALBUTerol/ipratropium for Nebulization 3 milliLiter(s) Nebulizer every 6 hours  aspirin enteric coated 81 milliGRAM(s) Oral daily  ATENolol  Tablet 25 milliGRAM(s) Oral daily  azithromycin   Tablet 500 milliGRAM(s) Oral daily  cefTRIAXone   IVPB 1000 milliGRAM(s) IV Intermittent every 24 hours  clopidogrel Tablet 75 milliGRAM(s) Oral daily  enoxaparin Injectable 40 milliGRAM(s) SubCutaneous daily  furosemide    Tablet 20 milliGRAM(s) Oral daily  sertraline 25 milliGRAM(s) Oral daily  simvastatin 20 milliGRAM(s) Oral at bedtime    MEDICATIONS  (PRN):  acetaminophen   Tablet .. 650 milliGRAM(s) Oral every 6 hours PRN Temp greater or equal to 38C (100.4F), Mild Pain (1 - 3)  guaiFENesin   Syrup  (Sugar-Free) 100 milliGRAM(s) Oral every 6 hours PRN Cough      Social History:  Cigarettes:          ex smoker          Alchohol:         none         Illicit Drug Abuse:  none     FH negative for CAD     ROS: Negative other than as mentioned in HPI.    Vital Signs Last 24 Hrs  T(C): 36.8 (26 Sep 2019 07:40), Max: 38.7 (25 Sep 2019 11:13)  T(F): 98.3 (26 Sep 2019 07:40), Max: 101.7 (25 Sep 2019 11:13)  HR: 61 (26 Sep 2019 07:40) (61 - 92)  BP: 136/64 (26 Sep 2019 07:40) (120/52 - 186/80)  BP(mean): --  RR: 20 (26 Sep 2019 04:49) (20 - 26)  SpO2: 100% (26 Sep 2019 07:40) (93% - 100%)  ICU Vital Signs Last 24 Hrs  EL SHER  I&O's Detail    I&O's Summary    Drug Dosing Weight  EL SHER      PHYSICAL EXAM:  General: Appears well developed, well nourished alert and cooperative.  HEENT: Head; normocephalic, atraumatic.  Eyes: Pupils reactive, cornea wnl.  Neck: Supple, no nodes adenopathy, no NVD or carotid bruit or thyromegaly.  CARDIOVASCULAR: Normal S1 and S2,  murmur, rub, gallop or lift. /  LUNGS: Decrease BS B/L at the lower bases   ABDOMEN: Soft, nontender without mass or organomegaly. bowel sounds normoactive.  EXTREMITIES: No clubbing, cyanosis or edema. Distal pulses wnl.   SKIN: warm and dry with normal turgor.  NEURO: Alert/oriented x 3/normal motor exam. No pathologic reflexes.    PSYCH: normal affect.        LABS:                        10.9   12.63 )-----------( 167      ( 26 Sep 2019 06:13 )             34.1         142  |  103  |  14.0  ----------------------------<  107  4.0   |  29.0  |  0.70    Ca    9.2      26 Sep 2019 06:13  Phos  2.9       Mg     1.9         TPro  7.4  /  Alb  3.9  /  TBili  0.7  /  DBili  x   /  AST  21  /  ALT  7   /  AlkPhos  69      EL NIKKY  CARDIAC MARKERS ( 25 Sep 2019 16:10 )  x     / <0.01 ng/mL / x     / x     / x          PT/INR - ( 25 Sep 2019 11:49 )   PT: 12.8 sec;   INR: 1.11 ratio         PTT - ( 25 Sep 2019 11:49 )  PTT:34.1 sec  Urinalysis Basic - ( 25 Sep 2019 13:33 )    Color: Yellow / Appearance: Clear / S.010 / pH: x  Gluc: x / Ketone: Negative  / Bili: Negative / Urobili: 1 mg/dL   Blood: x / Protein: 15 mg/dL / Nitrite: Negative   Leuk Esterase: Negative / RBC: x / WBC 3-5   Sq Epi: x / Non Sq Epi: Occasional / Bacteria: x        RADIOLOGY & ADDITIONAL STUDIES:    INTERPRETATION OF TELEMETRY (personally reviewed):    ECG: NSR LVH with repol unchanged from prior     ECHO: pending       CARDIAC CATHETERIZATION: 2018  DIAGNOSTIC IMPRESSIONS: Severe pulmonary HTN w/ PCW 20's.  Hyperdynamic LV w/ LVH.  No sig AS.  Moderate LCx and mild LAD disease.  DIAGNOSTIC RECOMMENDATIONS: Medical therapy.  Followup 2-4 weeks at [facility].  INTERVENTIONAL IMPRESSIONS: Severe pulmonary HTN w/ PCW 20's.  Hyperdynamic LV w/ LVH.  No sig AS.  Moderate LCx and mild LAD disease.  INTERVENTIONAL RECOMMENDATIONS: Medical therapy.  Followup 2-4 weeks at [facility].        TAVR at [facility] 3/2019    CT of the chest   IMPRESSION: New bibasilar infiltrates and new minimal left pleural   effusion  Stable small pericardial effusion  Stable hepaticcysts  Stable left adrenal adenoma  Emphysema again noted      Assessment a/nd Plan:  In summary, EL SHER is an 86y Female with past medical history significant for ex smoker COPD home O2 prior cardiac work up [facility] 2018 none obstructive CAD severe AS s/p TAVR at [facility] 3/2019 on DAPT HFpEF low dose Lasix who presented to the ED with worsening exertional dyspnea. Patient reports she generally has a degree of dyspnea with moderate exertion, but over the past few days she has experienced dyspnea with minimal exertion. States the shortness of breath is associated with a mild productive cough, and then she developed a fever of 102 last night with chills. Also, reports bilateral rib pain which lasted a few minutes and has resolved.  Denies dizziness, lightheadedness, chest pain, nausea, vomiting, abdominal pain or diarrhea. Denies sick contacts or recent travel. In the ED, patient was also noted to be febrile with leukocytosis. CXR was clear, but patient was presumed to have pneumonia and started on empiric antibiotics in which CT of the chest showed bibasilar infiltrates; NO evidence of ACS or decompensated heart failure     Plan  1.  Agree with IV Abx therapy   2.  Awaiting a repeat ECHO   3.  Continue DAPT and other CV medications
REASON FOR Tele-evaluation    SUBJECTIVE:  SOB     HPI: 86 yr old female Htn, COPD, on PRN oxygen, TAVR in march 2019, presents with fever and chills yesterday and cough and SOB and phlegm for past couple days , denied any CP, patient states she always sleeps in a recliner since her shoulder replacement for past 4 years so won't know if she has orthopnea , denied Chest pain , no worsening of leg edema.           ROS: as per HPI  (***)    T(C): 37.1 (09-25-19 @ 13:00), Max: 38.7 (09-25-19 @ 11:13)  HR: 78 (09-25-19 @ 13:00) (78 - 92)  BP: 152/70 (09-25-19 @ 13:00) (152/69 - 186/80)  RR: 20 (09-25-19 @ 13:00) (20 - 26)  SpO2: 97% (09-25-19 @ 13:00) (95% - 97%)    PHYSICAL EXAM: evaluation precludes physical exam. Pertinent physical exam findings as per video conference with  nurse at bedside is as follow: awake alert ,                         12.6   13.96 )-----------( 197      ( 25 Sep 2019 11:49 )             40.3   09-25    140  |  99  |  15.0  ----------------------------<  105  4.2   |  28.0  |  0.79    Ca    9.7      25 Sep 2019 11:49    TPro  7.4  /  Alb  3.9  /  TBili  0.7  /  DBili  x   /  AST  21  /  ALT  7   /  AlkPhos  69  09-25              Radiology findings < from: Xray Chest 1 View-PORTABLE IMMEDIATE (09.25.19 @ 12:24) >    Presently lungs are rather clear.    IMPRESSION: Clear lungs at this time.    < end of copied text >          Medication reconciliation done         Care plan discussed with hospitalist and family at bedside         Survey offered to patient

## 2019-09-30 NOTE — DIETITIAN INITIAL EVALUATION ADULT. - PERTINENT LABORATORY DATA
09-29 Na146 mmol/L<H> Glu 81 mg/dL K+ 3.7 mmol/L Cr  0.58 mg/dL BUN 16.0 mg/dL Phos n/a   Alb n/a   PAB n/a

## 2019-09-30 NOTE — PROGRESS NOTE ADULT - SUBJECTIVE AND OBJECTIVE BOX
Patient: EL SHER [id number] 86y Female                           Internal Medicine Hospitalist Progress Note    Interval History:  Seen with RN at bedside.  Reports increased respiratory secretions and SOB every morning.  Some improvement after nebulizers.  No fever/ chills.  No additional complaints.     ____________________PHYSICAL EXAM:  Vitals reviewed as indicated below  GENERAL:  NAD Alert and Oriented x 3 pleasant  HEENT: NCAT  CARDIOVASCULAR:  S1, S2  LUNGS: coarse bs b/l, no wheezing.   ABDOMEN:  soft, (-) tenderness, (-) distension, (+) bowel sounds, (-) guarding, (-) rebound (-) rigidity  EXTREMITIES:  no cyanosis / clubbing / edema.   ____________________    VITALS:  Vital Signs Last 24 Hrs  T(C): 36.6 (30 Sep 2019 08:08), Max: 37.1 (29 Sep 2019 23:30)  T(F): 97.8 (30 Sep 2019 08:08), Max: 98.7 (29 Sep 2019 23:30)  HR: 75 (30 Sep 2019 09:03) (73 - 80)  BP: 152/78 (30 Sep 2019 08:08) (131/57 - 152/78)  BP(mean): --  RR: 18 (30 Sep 2019 08:08) (18 - 18)  SpO2: 94% (30 Sep 2019 09:03) (93% - 100%) Daily     Daily Weight in k.8 (30 Sep 2019 09:31)  CAPILLARY BLOOD GLUCOSE        I&O's Summary    29 Sep 2019 07:01  -  30 Sep 2019 07:00  --------------------------------------------------------  IN: 540 mL / OUT: 0 mL / NET: 540 mL        LABS:                        10.8   5.79  )-----------( 197      ( 29 Sep 2019 06:50 )             34.3     09-    146<H>  |  103  |  16.0  ----------------------------<  81  3.7   |  31.0<H>  |  0.58    Ca    8.9      29 Sep 2019 06:50                    MEDICATIONS:  acetaminophen   Tablet .. 650 milliGRAM(s) Oral every 6 hours PRN  ALBUTerol/ipratropium for Nebulization 3 milliLiter(s) Nebulizer every 6 hours  aspirin enteric coated 81 milliGRAM(s) Oral daily  ATENolol  Tablet 25 milliGRAM(s) Oral daily  azithromycin   Tablet 500 milliGRAM(s) Oral daily  clopidogrel Tablet 75 milliGRAM(s) Oral daily  enoxaparin Injectable 40 milliGRAM(s) SubCutaneous daily  furosemide    Tablet 20 milliGRAM(s) Oral daily  guaiFENesin   Syrup  (Sugar-Free) 100 milliGRAM(s) Oral every 6 hours PRN  predniSONE   Tablet 40 milliGRAM(s) Oral daily  saccharomyces boulardii 250 milliGRAM(s) Oral two times a day  sertraline 25 milliGRAM(s) Oral daily  simvastatin 20 milliGRAM(s) Oral at bedtime

## 2019-09-30 NOTE — DISCHARGE NOTE NURSING/CASE MANAGEMENT/SOCIAL WORK - NSDCFUADDAPPT_GEN_ALL_CORE_FT
A DC FOLLOW UP APPOINTMENT HAS BEEN MADE FOR YOU ON 10/9 AT 10AM W/ DR. SHIMA CONKLIN [address]UNG [address]  [place]  [phone]

## 2019-09-30 NOTE — DISCHARGE NOTE NURSING/CASE MANAGEMENT/SOCIAL WORK - PATIENT PORTAL LINK FT
You can access the FollowMyHealth Patient Portal offered by [facility] by registering at the following website: [email or web address]. By joining [facility]’s FollowMyHealth portal, you will also be able to view your health information using other applications (apps) compatible with our system.

## 2019-09-30 NOTE — CHART NOTE - NSCHARTNOTEFT_GEN_A_CORE
Upon Nutritional Assessment by the Registered Dietitian your patient was determined to meet criteria / has evidence of the following diagnosis/diagnoses:          [ ]  Mild Protein Calorie Malnutrition        [x ]  Moderate Protein Calorie Malnutrition  CHRONIC        [ ] Severe Protein Calorie Malnutrition        [ ] Unspecified Protein Calorie Malnutrition        [ ] Underweight / BMI <19        [ ] Morbid Obesity / BMI > 40      Findings as based on:  •  Comprehensive nutrition assessment and consultation  •  Calorie counts (nutrient intake analysis)  •  Food acceptance and intake status from observations by staff  •  Follow up  •  Patient education  •  Intervention secondary to interdisciplinary rounds  •   concerns      Treatment:    The following diet has been recommended: Rec add one bottle Ensure daily, provide chopped vegetables and softer foods.      PROVIDER Section:     By signing this assessment you are acknowledging and agree with the diagnosis/diagnoses assigned by the Registered Dietitian    Comments:

## 2019-10-01 ENCOUNTER — INBOUND DOCUMENT (OUTPATIENT)
Age: 84
End: 2019-10-01

## 2019-10-01 ENCOUNTER — TRANSCRIPTION ENCOUNTER (OUTPATIENT)
Age: 84
End: 2019-10-01

## 2019-10-01 VITALS
SYSTOLIC BLOOD PRESSURE: 125 MMHG | RESPIRATION RATE: 18 BRPM | HEART RATE: 78 BPM | TEMPERATURE: 98 F | DIASTOLIC BLOOD PRESSURE: 66 MMHG | OXYGEN SATURATION: 97 %

## 2019-10-01 LAB
ANION GAP SERPL CALC-SCNC: 9 MMOL/L — SIGNIFICANT CHANGE UP (ref 5–17)
BUN SERPL-MCNC: 17 MG/DL — SIGNIFICANT CHANGE UP (ref 8–20)
CALCIUM SERPL-MCNC: 9 MG/DL — SIGNIFICANT CHANGE UP (ref 8.6–10.2)
CHLORIDE SERPL-SCNC: 100 MMOL/L — SIGNIFICANT CHANGE UP (ref 98–107)
CO2 SERPL-SCNC: 36 MMOL/L — HIGH (ref 22–29)
CREAT SERPL-MCNC: 0.69 MG/DL — SIGNIFICANT CHANGE UP (ref 0.5–1.3)
GLUCOSE SERPL-MCNC: 92 MG/DL — SIGNIFICANT CHANGE UP (ref 70–115)
HCT VFR BLD CALC: 37 % — SIGNIFICANT CHANGE UP (ref 34.5–45)
HGB BLD-MCNC: 11.5 G/DL — SIGNIFICANT CHANGE UP (ref 11.5–15.5)
MCHC RBC-ENTMCNC: 28.7 PG — SIGNIFICANT CHANGE UP (ref 27–34)
MCHC RBC-ENTMCNC: 31.1 GM/DL — LOW (ref 32–36)
MCV RBC AUTO: 92.3 FL — SIGNIFICANT CHANGE UP (ref 80–100)
PLATELET # BLD AUTO: 209 K/UL — SIGNIFICANT CHANGE UP (ref 150–400)
POTASSIUM SERPL-MCNC: 3.6 MMOL/L — SIGNIFICANT CHANGE UP (ref 3.5–5.3)
POTASSIUM SERPL-SCNC: 3.6 MMOL/L — SIGNIFICANT CHANGE UP (ref 3.5–5.3)
RBC # BLD: 4.01 M/UL — SIGNIFICANT CHANGE UP (ref 3.8–5.2)
RBC # FLD: 14.6 % — HIGH (ref 10.3–14.5)
SODIUM SERPL-SCNC: 145 MMOL/L — SIGNIFICANT CHANGE UP (ref 135–145)
WBC # BLD: 7.71 K/UL — SIGNIFICANT CHANGE UP (ref 3.8–10.5)
WBC # FLD AUTO: 7.71 K/UL — SIGNIFICANT CHANGE UP (ref 3.8–10.5)

## 2019-10-01 PROCEDURE — 99239 HOSP IP/OBS DSCHRG MGMT >30: CPT

## 2019-10-01 RX ORDER — FUROSEMIDE 40 MG
1 TABLET ORAL
Qty: 30 | Refills: 0
Start: 2019-10-01

## 2019-10-01 RX ORDER — CLOPIDOGREL BISULFATE 75 MG/1
1 TABLET, FILM COATED ORAL
Qty: 0 | Refills: 0 | DISCHARGE
Start: 2019-10-01

## 2019-10-01 RX ORDER — ALBUTEROL 90 UG/1
1 AEROSOL, METERED ORAL
Qty: 0 | Refills: 0 | DISCHARGE

## 2019-10-01 RX ORDER — SACCHAROMYCES BOULARDII 250 MG
1 POWDER IN PACKET (EA) ORAL
Qty: 20 | Refills: 0
Start: 2019-10-01

## 2019-10-01 RX ADMIN — Medication 3 MILLILITER(S): at 09:01

## 2019-10-01 RX ADMIN — CLOPIDOGREL BISULFATE 75 MILLIGRAM(S): 75 TABLET, FILM COATED ORAL at 12:10

## 2019-10-01 RX ADMIN — Medication 100 MILLIGRAM(S): at 05:35

## 2019-10-01 RX ADMIN — ATENOLOL 25 MILLIGRAM(S): 25 TABLET ORAL at 05:35

## 2019-10-01 RX ADMIN — Medication 20 MILLIGRAM(S): at 05:35

## 2019-10-01 RX ADMIN — Medication 250 MILLIGRAM(S): at 05:36

## 2019-10-01 RX ADMIN — Medication 40 MILLIGRAM(S): at 05:36

## 2019-10-01 RX ADMIN — Medication 3 MILLILITER(S): at 14:32

## 2019-10-01 RX ADMIN — ENOXAPARIN SODIUM 40 MILLIGRAM(S): 100 INJECTION SUBCUTANEOUS at 11:09

## 2019-10-01 RX ADMIN — Medication 3 MILLILITER(S): at 03:43

## 2019-10-01 RX ADMIN — Medication 600 MILLIGRAM(S): at 05:35

## 2019-10-01 RX ADMIN — Medication 81 MILLIGRAM(S): at 11:09

## 2019-10-01 NOTE — DISCHARGE NOTE PROVIDER - CARE PROVIDERS DIRECT ADDRESSES
[email or web address]

## 2019-10-01 NOTE — PROGRESS NOTE ADULT - ASSESSMENT
Patient is a 86 year old female with PMH of COPD on home O2, AS s/p TAVR, HTN, CAD, Depression and HLD who presented to the ED with worsening exertional dyspnea, admitted for pneumonia.     > Acute on Chronic Hypoxic Respiratory Failure likely due to Community Acquired PNA (suspected gram positive and gram negative organism)  -continue current antibiotics.  Transition to po antibiotics on discharge.  STill requiring increased FIO2 (at 3l/min nc with SaO2 88% with activity at times).  Continue O2.    > COPD with exacerbation - symptoms improving.  Tapered to po steroids.  Continue nebulizers.  Pulmonary evaluation appreciated and discussed with Dr. Mejia.  > AS s/p TAVR - Cardiology following  > HTN -BP stable, continue home medications -low sodium diet   > Hyperlipidemia - diet modification.  Continue Statin.   > Depression continue sertraline    DVT ppx - Lovenox SC

## 2019-10-01 NOTE — DISCHARGE NOTE PROVIDER - NSDCFUADDAPPT_GEN_ALL_CORE_FT
A DC FOLLOW UP APPOINTMENT HAS BEEN MADE FOR YOU ON 10/9 AT 10AM W/ DR. SHIMA CONKLIN LUNG [address]  [place]  [phone]

## 2019-10-01 NOTE — DISCHARGE NOTE PROVIDER - NSDCCPCAREPLAN_GEN_ALL_CORE_FT
PRINCIPAL DISCHARGE DIAGNOSIS  Diagnosis: Pneumonia of right lower lobe due to infectious organism  Assessment and Plan of Treatment:       SECONDARY DISCHARGE DIAGNOSES  Diagnosis: Hyperlipidemia  Assessment and Plan of Treatment:     Diagnosis: Hypertension  Assessment and Plan of Treatment:     Diagnosis: Acute on chronic respiratory failure with hypoxemia  Assessment and Plan of Treatment:     Diagnosis: COPD with exacerbation  Assessment and Plan of Treatment:     Diagnosis: Sepsis  Assessment and Plan of Treatment:

## 2019-10-01 NOTE — DISCHARGE NOTE PROVIDER - HOSPITAL COURSE
Patient is a 86 year old female with PMH of COPD on home O2, AS s/p TAVR, HTN, CAD, Depression and HLD who presented to the ED with worsening exertional dyspnea, admitted for pneumonia.         > Acute on Chronic Hypoxic Respiratory Failure likely due to Community Acquired PNA (suspected gram positive and gram negative organism)    -continue current antibiotics.  Discharge on po doxycycline.  SaO2 is improving.      > COPD with exacerbation - symptoms improving.  Tapered to po steroids.  Continue nebulizers.  Pulmonary evaluation d/w Dr. Mejia.    > AS s/p TAVR - Cardiology following    > HTN -BP stable, continue home medications -low sodium diet     > Hyperlipidemia - diet modification.  Continue Statin.     > Depression continue sertraline        Disposition: Stable for discharge.  Outpatient followup discussed.    Total time spent on discharge is  45 minutes.

## 2019-10-01 NOTE — PROGRESS NOTE ADULT - SUBJECTIVE AND OBJECTIVE BOX
Patient: EL SHER [id number] 86y Female                           Internal Medicine Hospitalist Progress Note    Interval History:  Daughter at bedside.  No complaints.  Ambulating with assistance.  No SOB.  Mild cough has improved.  No additional complaints.    ____________________PHYSICAL EXAM:  Vitals reviewed as indicated below  GENERAL:  NAD Alert and Oriented x 3 pleasant  HEENT: NCAT  CARDIOVASCULAR:  S1, S2  LUNGS: coarse bs b/l, no wheezing.   ABDOMEN:  soft, (-) tenderness, (-) distension, (+) bowel sounds, (-) guarding, (-) rebound (-) rigidity  EXTREMITIES:  no cyanosis / clubbing / edema.   ____________________    VITALS:  Vital Signs Last 24 Hrs  T(C): 36.7 (01 Oct 2019 07:54), Max: 36.8 (30 Sep 2019 16:43)  T(F): 98 (01 Oct 2019 07:54), Max: 98.3 (01 Oct 2019 05:00)  HR: 72 (01 Oct 2019 09:04) (62 - 80)  BP: 152/73 (01 Oct 2019 07:54) (135/66 - 163/76)  BP(mean): --  RR: 18 (01 Oct 2019 07:54) (17 - 18)  SpO2: 91% (01 Oct 2019 09:04) (91% - 100%) Daily     Daily   CAPILLARY BLOOD GLUCOSE        I&O's Summary    30 Sep 2019 07:01  -  01 Oct 2019 07:00  --------------------------------------------------------  IN: 660 mL / OUT: 200 mL / NET: 460 mL        LABS:                        11.5   7.71  )-----------( 209      ( 01 Oct 2019 07:07 )             37.0     10-01    145  |  100  |  17.0  ----------------------------<  92  3.6   |  36.0<H>  |  0.69    Ca    9.0      01 Oct 2019 07:07                    MEDICATIONS:  acetaminophen   Tablet .. 650 milliGRAM(s) Oral every 6 hours PRN  ALBUTerol/ipratropium for Nebulization 3 milliLiter(s) Nebulizer every 6 hours  aspirin enteric coated 81 milliGRAM(s) Oral daily  ATENolol  Tablet 25 milliGRAM(s) Oral daily  clopidogrel Tablet 75 milliGRAM(s) Oral daily  doxycycline hyclate Capsule 100 milliGRAM(s) Oral every 12 hours  enoxaparin Injectable 40 milliGRAM(s) SubCutaneous daily  furosemide    Tablet 20 milliGRAM(s) Oral daily  guaiFENesin  milliGRAM(s) Oral every 12 hours  predniSONE   Tablet 40 milliGRAM(s) Oral daily  saccharomyces boulardii 250 milliGRAM(s) Oral two times a day  sertraline 25 milliGRAM(s) Oral daily  simvastatin 20 milliGRAM(s) Oral at bedtime

## 2019-10-01 NOTE — DISCHARGE NOTE PROVIDER - CARE PROVIDER_API CALL
Oziel Mejia ()  Critical Care Medicine; Internal Medicine; Pulmonary Disease  [address] [facility], [address]  [place] [address]  Phone: [phone]  Fax: [phone]  Follow Up Time:     David Olea (MD)  Cardiovascular Disease; Internal Medicine  [address] [place] [address]  Phone: [phone]  Fax: [phone]  Follow Up Time:

## 2019-10-01 NOTE — DISCHARGE NOTE PROVIDER - NSDCFUSCHEDAPPT_GEN_ALL_CORE_FT
EL SHER ; 10/09/2019 ; NPP PulmMed [address] ; 10/10/2019 ; NPP IntMed [facility]  EL SHER ; 10/28/2019 ; NPP PulFreda [address]

## 2019-10-01 NOTE — DISCHARGE NOTE PROVIDER - NSDCFUADDINST_GEN_ALL_CORE_FT
It is important to see your primary physician as well as the physicians noted below within the next week to perform a comprehensive medical review.  Call their offices for an appointment as soon as you leave the hospital.  If you do not have a primary physician, contact the [facility] [phone] located on [address] [place] [address].  Your medical issues appear to be stable at this time, but if your symptoms recur or worsen, contact your physicians and/or return to the hospital if necessary.  If you encounter any issues or questions with your medication, call your physicians before stopping the medication.  Do not drive.  Limit your diet to 2 grams of sodium daily.

## 2019-10-02 ENCOUNTER — INBOUND DOCUMENT (OUTPATIENT)
Age: 84
End: 2019-10-02

## 2019-10-03 ENCOUNTER — APPOINTMENT (OUTPATIENT)
Dept: CARE COORDINATION | Facility: HOME HEALTH | Age: 84
End: 2019-10-03
Payer: MEDICARE

## 2019-10-03 VITALS
SYSTOLIC BLOOD PRESSURE: 142 MMHG | HEART RATE: 69 BPM | DIASTOLIC BLOOD PRESSURE: 64 MMHG | TEMPERATURE: 97.9 F | OXYGEN SATURATION: 96 % | RESPIRATION RATE: 16 BRPM

## 2019-10-03 DIAGNOSIS — Z29.9 ENCOUNTER FOR PROPHYLACTIC MEASURES, UNSPECIFIED: ICD-10-CM

## 2019-10-03 PROCEDURE — 99495 TRANSJ CARE MGMT MOD F2F 14D: CPT

## 2019-10-03 RX ORDER — ASPIRIN 81 MG
81 TABLET, DELAYED RELEASE (ENTERIC COATED) ORAL DAILY
Refills: 3 | Status: ACTIVE | COMMUNITY

## 2019-10-03 RX ORDER — PANTOPRAZOLE 40 MG/1
40 TABLET, DELAYED RELEASE ORAL
Qty: 90 | Refills: 3 | Status: ACTIVE | COMMUNITY
Start: 2019-10-03 | End: 1900-01-01

## 2019-10-03 NOTE — HEALTH RISK ASSESSMENT
[] : No [No] : No [No falls in past year] : Patient reported no falls in the past year [0] : 2) Feeling down, depressed, or hopeless: Not at all (0) [de-identified] : quit in Feb 2019

## 2019-10-03 NOTE — COUNSELING
[Fall prevention counseling provided] : Fall prevention counseling provided [de-identified] : 1. CHF\par    - low sodium diet\par    - 1-1.2L fluid restriction (including soup, tea, juice and waster)\par    - Daily weight monitor; if wt gain >1-2lb in 2-3 days, > 3-5lbs in a week, please call CCC/CN \par    - Daily BP monitor, if SBP<100, do not give BP meds, wait 1 hrs to recheck BP, if SBP<100 again, call CCC/CN\par \par 2. Pulmonary health\par    - Deep breathing exercise in sitting up tall or standing position, 10 times hourly\par    - Complete oral antibiotics as ordered, take probiotics during antibiotics therapy.\par    - Continue using nebulizers and inhalers Q4hrs as ordered.\par \par  [None] : None [Good understanding] : Patient has a good understanding of lifestyle changes and steps needed to achieve self management goal

## 2019-10-03 NOTE — ASSESSMENT
[FreeTextEntry1] : Ms. Nash is living with a sister who is 93 y/o with moderately demented. Patient is overall feeling improved than before, but still has lingering non-productive cough with chest tightness at times. O2 saturation between 95- 98% on 2L NC, continuous O2, \par Pt reports voiding freely, no LE edema noted. \par Medication schedule table made to enhance compliance.\par \par 1) COPD on home O2 - continue Anoro, nebs, complete prednisone taper\par 2) Bacterial PNA - stable, afebrile complete Doxycycline 100mg BID + probiotic BID\par 3) CAD - Continue ASA and PLavix\par 4) HTN - Continue atenolol, BP stable\par 5) HLD - Continue simvastatin\par 6) ADD pantoprazole 40mg for GI protection with Prednisone taper and dual AC\par 7) Follow-up:  Dr. ZUÑIGA (Pul) 10/9 @ 10am, 10/28 with Dr. Mejia,  Dr. Olea 10/10, Dr. Grover 10/27\par 8) Reviewed all medications at length with patient, all questions addressed. Worsening symptoms discussed with pt understanding. No issues or concerns at this time. Pt encouraged to call [facility]/CN for at [phone] w/any questions, concerns or issues. Will continue to follow up with patient status\par \par

## 2019-10-03 NOTE — REVIEW OF SYSTEMS
[Fever] : no fever [Chills] : no chills [Fatigue] : fatigue [Chest Pain] : no chest pain [Palpitations] : no palpitations [Leg Claudication] : no leg claudication [Orthopnea] : orthopnea [Paroxysmal Nocturnal Dyspnea] : no paroxysmal nocturnal dyspnea [Cough] : cough [Dyspnea on Exertion] : dyspnea on exertion [Abdominal Pain] : no abdominal pain [Nausea] : no nausea [Constipation] : no constipation [Diarrhea] : diarrhea [Vomiting] : no vomiting [Melena] : no melena [Heartburn] : no heartburn [Dysuria] : no dysuria [Incontinence] : no incontinence [Nocturia] : no nocturia [Hematuria] : no hematuria [Frequency] : no frequency [Joint Pain] : no joint pain [Joint Stiffness] : no joint stiffness [Joint Swelling] : no joint swelling [Muscle Weakness] : no muscle weakness [Muscle Pain] : no muscle pain [Back Pain] : no back pain [Headache] : no headache [Fainting] : no fainting [Dizziness] : no dizziness [Confusion] : no confusion [Unsteady Walking] : no ataxia [Insomnia] : no insomnia [Anxiety] : no anxiety [Depression] : no depression [Negative] : Heme/Lymph [FreeTextEntry5] : 'I have been living nad sleeping in the recliner'  [FreeTextEntry6] : 'still have cough but can't cough up anything'

## 2019-10-03 NOTE — PHYSICAL EXAM
[Well Nourished] : well nourished [No Acute Distress] : no acute distress [Well Developed] : well developed [Well-Appearing] : well-appearing [Normal Sclera/Conjunctiva] : normal sclera/conjunctiva [PERRL] : pupils equal round and reactive to light [EOMI] : extraocular movements intact [Normal Outer Ear/Nose] : the outer ears and nose were normal in appearance [Normal Oropharynx] : the oropharynx was normal [No JVD] : no jugular venous distention [No Lymphadenopathy] : no lymphadenopathy [Supple] : supple [No Respiratory Distress] : no respiratory distress  [Thyroid Normal, No Nodules] : the thyroid was normal and there were no nodules present [No Accessory Muscle Use] : no accessory muscle use [Normal Rate] : normal rate  [Regular Rhythm] : with a regular rhythm [Normal S1, S2] : normal S1 and S2 [No Murmur] : no murmur heard [No Carotid Bruits] : no carotid bruits [No Varicosities] : no varicosities [No Abdominal Bruit] : a ~M bruit was not heard ~T in the abdomen [Pedal Pulses Present] : the pedal pulses are present [No Edema] : there was no peripheral edema [No Palpable Aorta] : no palpable aorta [Soft] : abdomen soft [No Extremity Clubbing/Cyanosis] : no extremity clubbing/cyanosis [Non Tender] : non-tender [Non-distended] : non-distended [No Masses] : no abdominal mass palpated [No HSM] : no HSM [Normal Bowel Sounds] : normal bowel sounds [Normal Anterior Cervical Nodes] : no anterior cervical lymphadenopathy [Normal Posterior Cervical Nodes] : no posterior cervical lymphadenopathy [No CVA Tenderness] : no CVA  tenderness [No Spinal Tenderness] : no spinal tenderness [No Joint Swelling] : no joint swelling [Grossly Normal Strength/Tone] : grossly normal strength/tone [No Rash] : no rash [Coordination Grossly Intact] : coordination grossly intact [No Focal Deficits] : no focal deficits [Deep Tendon Reflexes (DTR)] : deep tendon reflexes were 2+ and symmetric [Normal Gait] : normal gait [Speech Grossly Normal] : speech grossly normal [Memory Grossly Normal] : memory grossly normal [Normal Affect] : the affect was normal [Alert and Oriented x3] : oriented to person, place, and time [Normal Mood] : the mood was normal [Normal Insight/Judgement] : insight and judgment were intact [de-identified] : diminished lung sounds with scattered faint wheezing on expiration, LLL fine crackles

## 2019-10-03 NOTE — HISTORY OF PRESENT ILLNESS
[Family Member] : family member [FreeTextEntry1] : S/p recent hospitalization (9/25-10/1) for sepsis due to bacterial PNA [de-identified] : Ms. Nash is 85 y/o female with PMHX of COPD on continuous home O2 2L, CAD, HTN, HLD, AS s/p TAVR and depression, presented to ED with worsening dyspnea on exertion, found to have sepsis due to PNA, discharged on 10/1 with [facility] via [facility]. post discharge follow-up made via phone in 48hrs, all charts reviewed. [place] seen at home today, reporting feeling weak, activity level not returned to baseline, +non productive cough,

## 2019-10-09 ENCOUNTER — APPOINTMENT (OUTPATIENT)
Dept: PULMONOLOGY | Facility: CLINIC | Age: 84
End: 2019-10-09
Payer: MEDICARE

## 2019-10-09 VITALS
SYSTOLIC BLOOD PRESSURE: 140 MMHG | OXYGEN SATURATION: 98 % | HEART RATE: 68 BPM | BODY MASS INDEX: 22.82 KG/M2 | WEIGHT: 124 LBS | DIASTOLIC BLOOD PRESSURE: 70 MMHG | HEIGHT: 62 IN

## 2019-10-09 DIAGNOSIS — Z09 ENCOUNTER FOR FOLLOW-UP EXAMINATION AFTER COMPLETED TREATMENT FOR CONDITIONS OTHER THAN MALIGNANT NEOPLASM: ICD-10-CM

## 2019-10-09 DIAGNOSIS — Z87.891 PERSONAL HISTORY OF NICOTINE DEPENDENCE: ICD-10-CM

## 2019-10-09 DIAGNOSIS — F17.200 NICOTINE DEPENDENCE, UNSPECIFIED, UNCOMPLICATED: ICD-10-CM

## 2019-10-09 DIAGNOSIS — R91.8 OTHER NONSPECIFIC ABNORMAL FINDING OF LUNG FIELD: ICD-10-CM

## 2019-10-09 PROCEDURE — 99495 TRANSJ CARE MGMT MOD F2F 14D: CPT

## 2019-10-09 NOTE — DISCUSSION/SUMMARY
[FreeTextEntry1] : \par #1. Continue Anoro with nebulizer therapy for mild COPD on spirometry\par #2. On home O2 but wean as tolerates to keep >90%; pt monitors her own pulse ox \par #3. Exercise as tolerates to improve exercise tolerance\par #4. Eventual f/u chest CT to ensure resolution of infiltrates seen on CT during hospitalization\par #5. Cardiology f/u to optimize cardiac function given h/o TAVR\par #6. F/u with Dr. Mejia in 3 weeks as previously scheduled\par \par Discussed above with patient and daughter who was also present.

## 2019-10-09 NOTE — REVIEW OF SYSTEMS
[Nasal Congestion] : nasal congestion [Hypertension] : ~T hypertension [Edema] : ~T edema was present [Depression] : depression [Anxiety] : anxiety [Dry Eyes] : no dryness of the eyes [Chills] : no chills [Fever] : no fever [Eye Irritation] : no ~T irritation of the eyes [Epistaxis] : no nosebleeds [Sinus Problems] : no sinus problems [Postnasal Drip] : no postnasal drip [Sputum] : not coughing up ~M sputum [Cough] : no cough [Chest Tightness] : no chest tightness [Dyspnea] : no dyspnea [Pleuritic Pain] : no pleuritic pain [Wheezing] : no wheezing [Chest Discomfort] : no chest discomfort [Murmurs] : no murmurs were heard [Dysrhythmia] : no dysrhythmia [Hay Fever] : no hay fever [Palpitations] : no palpitations [Itchy Eyes] : no itching of ~T the eyes [Reflux] : no reflux [Nausea] : no nausea [Vomiting] : no vomiting [Constipation] : no constipation [Diarrhea] : no diarrhea [Abdominal Pain] : no abdominal pain [Dysuria] : no dysuria [Fracture] : no fracture [Trauma] : no ~T physical trauma [Dizziness] : no dizziness [Headache] : no headache [Anemia] : no anemia [Numbness] : no numbness [Syncope] : no fainting [Paralysis] : no paralysis was seen [Diabetes] : no diabetes mellitus [Seizures] : no seizures [Witnessed Apneas] : demonstrated no ~M apnea [Thyroid Problem] : no thyroid problem [Snoring] : no snoring

## 2019-10-09 NOTE — CONSULT LETTER
[Dear  ___] : Dear  [unfilled], [Consult Letter:] : I had the pleasure of evaluating your patient, [unfilled]. [Please see my note below.] : Please see my note below. [Consult Closing:] : Thank you very much for allowing me to participate in the care of this patient.  If you have any questions, please do not hesitate to contact me. [Sincerely,] : Sincerely, [FreeTextEntry3] : Remington Chong MD, FCCP, D. ABSM\par Pulmonary and Sleep Medicine\par [facility] [place]\par

## 2019-10-09 NOTE — REASON FOR VISIT
[Follow-Up - From Hospitalization] : a hospitalization follow-up [COPD] : COPD [Abnormal CT Scan] : abnormal CT Scan [Cough] : cough [Shortness of Breath] : shortness of Breath [Family Member] : family member [FreeTextEntry2] : Pneumonia, hypoxia

## 2019-10-09 NOTE — PHYSICAL EXAM
[General Appearance - Well Developed] : well developed [Normal Appearance] : normal appearance [General Appearance - In No Acute Distress] : no acute distress [Normal Conjunctiva] : the conjunctiva exhibited no abnormalities [Normal Oropharynx] : normal oropharynx [Neck Appearance] : the appearance of the neck was normal [Heart Rate And Rhythm] : heart rate and rhythm were normal [Heart Sounds] : normal S1 and S2 [Murmurs] : no murmurs present [] : no respiratory distress [Respiration, Rhythm And Depth] : normal respiratory rhythm and effort [Exaggerated Use Of Accessory Muscles For Inspiration] : no accessory muscle use [Auscultation Breath Sounds / Voice Sounds] : lungs were clear to auscultation bilaterally [Abdomen Soft] : soft [Abdomen Tenderness] : non-tender [Abnormal Walk] : normal gait [Nail Clubbing] : no clubbing of the fingernails [Cyanosis, Localized] : no localized cyanosis [1+ Pitting] : 1+  pitting [No Focal Deficits] : no focal deficits [Oriented To Time, Place, And Person] : oriented to person, place, and time [FreeTextEntry1] : No abnormalities.

## 2019-10-10 ENCOUNTER — APPOINTMENT (OUTPATIENT)
Dept: INTERNAL MEDICINE | Facility: CLINIC | Age: 84
End: 2019-10-10
Payer: MEDICARE

## 2019-10-10 VITALS
DIASTOLIC BLOOD PRESSURE: 53 MMHG | HEIGHT: 62 IN | WEIGHT: 124 LBS | BODY MASS INDEX: 22.82 KG/M2 | SYSTOLIC BLOOD PRESSURE: 130 MMHG

## 2019-10-10 DIAGNOSIS — I25.10 ATHEROSCLEROTIC HEART DISEASE OF NATIVE CORONARY ARTERY W/OUT ANGINA PECTORIS: ICD-10-CM

## 2019-10-10 PROCEDURE — 99358 PROLONG SERVICE W/O CONTACT: CPT

## 2019-10-10 PROCEDURE — 99214 OFFICE O/P EST MOD 30 MIN: CPT | Mod: 25

## 2019-10-10 RX ORDER — FUROSEMIDE 20 MG/1
20 TABLET ORAL
Refills: 0 | Status: ACTIVE | COMMUNITY
Start: 2018-12-21

## 2019-10-10 RX ORDER — CLOPIDOGREL 75 MG/1
75 TABLET, FILM COATED ORAL DAILY
Refills: 0 | Status: DISCONTINUED | COMMUNITY
End: 2019-10-10

## 2019-10-10 NOTE — PHYSICAL EXAM
[No Acute Distress] : no acute distress [Well Developed] : well developed [Well-Appearing] : well-appearing [Well Nourished] : well nourished [Normal Sclera/Conjunctiva] : normal sclera/conjunctiva [PERRL] : pupils equal round and reactive to light [EOMI] : extraocular movements intact [Normal Outer Ear/Nose] : the outer ears and nose were normal in appearance [Normal Oropharynx] : the oropharynx was normal [No JVD] : no jugular venous distention [No Lymphadenopathy] : no lymphadenopathy [Supple] : supple [Thyroid Normal, No Nodules] : the thyroid was normal and there were no nodules present [No Respiratory Distress] : no respiratory distress  [No Accessory Muscle Use] : no accessory muscle use [Clear to Auscultation] : lungs were clear to auscultation bilaterally [Regular Rhythm] : with a regular rhythm [Normal Rate] : normal rate  [No Murmur] : no murmur heard [Normal S1, S2] : normal S1 and S2 [No Abdominal Bruit] : a ~M bruit was not heard ~T in the abdomen [No Carotid Bruits] : no carotid bruits [No Varicosities] : no varicosities [Pedal Pulses Present] : the pedal pulses are present [No Edema] : there was no peripheral edema [No Extremity Clubbing/Cyanosis] : no extremity clubbing/cyanosis [No Palpable Aorta] : no palpable aorta [Soft] : abdomen soft [Non Tender] : non-tender [Non-distended] : non-distended [No Masses] : no abdominal mass palpated [No HSM] : no HSM [Normal Bowel Sounds] : normal bowel sounds [Normal Posterior Cervical Nodes] : no posterior cervical lymphadenopathy [Normal Anterior Cervical Nodes] : no anterior cervical lymphadenopathy [No CVA Tenderness] : no CVA  tenderness [No Joint Swelling] : no joint swelling [No Spinal Tenderness] : no spinal tenderness [Grossly Normal Strength/Tone] : grossly normal strength/tone [No Rash] : no rash [Coordination Grossly Intact] : coordination grossly intact [No Focal Deficits] : no focal deficits [Normal Gait] : normal gait [Normal Affect] : the affect was normal [Deep Tendon Reflexes (DTR)] : deep tendon reflexes were 2+ and symmetric [Normal Insight/Judgement] : insight and judgment were intact

## 2019-10-10 NOTE — HISTORY OF PRESENT ILLNESS
[FreeTextEntry1] : pt was in hosp [de-identified] : PT HERE FOR FOLLOW UP FEELS OK  S/P   TAVR  3   OVERALL FEELSOK\par PT HAS COPD AND USES O2  \par [facility] EXACERBATION IN AND PNEUMONIA

## 2019-10-28 ENCOUNTER — APPOINTMENT (OUTPATIENT)
Dept: PULMONOLOGY | Facility: CLINIC | Age: 84
End: 2019-10-28
Payer: MEDICARE

## 2019-10-28 ENCOUNTER — FORM ENCOUNTER (OUTPATIENT)
Age: 84
End: 2019-10-28

## 2019-10-28 VITALS
OXYGEN SATURATION: 95 % | WEIGHT: 122 LBS | SYSTOLIC BLOOD PRESSURE: 115 MMHG | BODY MASS INDEX: 22.31 KG/M2 | DIASTOLIC BLOOD PRESSURE: 60 MMHG | HEART RATE: 66 BPM

## 2019-10-28 PROCEDURE — 99215 OFFICE O/P EST HI 40 MIN: CPT

## 2019-10-28 RX ORDER — DOXYCYCLINE HYCLATE 100 MG/1
100 TABLET ORAL
Qty: 14 | Refills: 1 | Status: DISCONTINUED | COMMUNITY
Start: 2019-10-03 | End: 2019-10-28

## 2019-10-28 RX ORDER — GUAIFENESIN AND PSEUDOEPHEDRINE HYDROCHLORIDE 600; 60 MG/1; MG/1
60-600 TABLET, EXTENDED RELEASE ORAL
Refills: 0 | Status: DISCONTINUED | COMMUNITY
Start: 2019-10-03 | End: 2019-10-28

## 2019-10-28 NOTE — REASON FOR VISIT
[Follow-Up] : a follow-up visit [Abnormal CT Scan] : abnormal CT Scan [Shortness of Breath] : shortness of Breath [COPD] : COPD [FreeTextEntry2] : pneumonia

## 2019-10-28 NOTE — CONSULT LETTER
[Dear  ___] : Dear  [unfilled], [Consult Letter:] : I had the pleasure of evaluating your patient, [unfilled]. [Please see my note below.] : Please see my note below. [Consult Closing:] : Thank you very much for allowing me to participate in the care of this patient.  If you have any questions, please do not hesitate to contact me. [Sincerely,] : Sincerely, [DrBecky  ___] : Dr. DENISE [[facility]] : [facility], [facility] [Director, Respiratory Care] : Director, Respiratory Care [[facility]] : [facility] [FreeTextEntry3] : Oziel Mejia DO [facility]\par Pulmonary Critical Care\par Director Pulmonary Division\par Medical Director Respiratory Therapy\par [facility]\par \par

## 2019-10-28 NOTE — HISTORY OF PRESENT ILLNESS
[FreeTextEntry1] : having back pain, worse \par mechanism was turning/twisting injury\par sputum is whitish\par at baseline\par no fever, chill, chest pain

## 2019-10-28 NOTE — HISTORY REVIEWED
Cooperative/Alert/Awake [History reviewed] : History reviewed. [Medications and Allergies reviewed] : Medications and allergies reviewed.

## 2019-10-29 ENCOUNTER — APPOINTMENT (OUTPATIENT)
Dept: RADIOLOGY | Facility: CLINIC | Age: 84
End: 2019-10-29

## 2019-10-29 ENCOUNTER — OUTPATIENT (OUTPATIENT)
Dept: OUTPATIENT SERVICES | Facility: HOSPITAL | Age: 84
LOS: 1 days | End: 2019-10-29
Payer: MEDICARE

## 2019-10-29 DIAGNOSIS — Z98.890 OTHER SPECIFIED POSTPROCEDURAL STATES: Chronic | ICD-10-CM

## 2019-10-29 DIAGNOSIS — K76.89 OTHER SPECIFIED DISEASES OF LIVER: Chronic | ICD-10-CM

## 2019-10-29 DIAGNOSIS — J47.9 BRONCHIECTASIS, UNCOMPLICATED: ICD-10-CM

## 2019-10-29 DIAGNOSIS — Z96.619 PRESENCE OF UNSPECIFIED ARTIFICIAL SHOULDER JOINT: Chronic | ICD-10-CM

## 2019-10-29 PROCEDURE — 71046 X-RAY EXAM CHEST 2 VIEWS: CPT | Mod: 26

## 2019-10-29 PROCEDURE — 71046 X-RAY EXAM CHEST 2 VIEWS: CPT

## 2019-10-31 ENCOUNTER — MESSAGE (OUTPATIENT)
Age: 84
End: 2019-10-31

## 2019-11-05 ENCOUNTER — RX RENEWAL (OUTPATIENT)
Age: 84
End: 2019-11-05

## 2019-11-05 RX ORDER — SERTRALINE 25 MG/1
25 TABLET, FILM COATED ORAL DAILY
Qty: 90 | Refills: 1 | Status: ACTIVE | COMMUNITY
Start: 2019-11-05 | End: 1900-01-01

## 2019-11-05 RX ORDER — SIMVASTATIN 20 MG/1
20 TABLET, FILM COATED ORAL
Qty: 90 | Refills: 2 | Status: ACTIVE | COMMUNITY
Start: 2019-11-05 | End: 1900-01-01

## 2019-11-08 ENCOUNTER — APPOINTMENT (OUTPATIENT)
Dept: INTERNAL MEDICINE | Facility: CLINIC | Age: 84
End: 2019-11-08
Payer: MEDICARE

## 2019-11-08 VITALS
SYSTOLIC BLOOD PRESSURE: 123 MMHG | BODY MASS INDEX: 22.45 KG/M2 | DIASTOLIC BLOOD PRESSURE: 71 MMHG | WEIGHT: 122 LBS | HEIGHT: 62 IN

## 2019-11-08 DIAGNOSIS — M54.6 PAIN IN THORACIC SPINE: ICD-10-CM

## 2019-11-08 DIAGNOSIS — I10 ESSENTIAL (PRIMARY) HYPERTENSION: ICD-10-CM

## 2019-11-08 DIAGNOSIS — Z23 ENCOUNTER FOR IMMUNIZATION: ICD-10-CM

## 2019-11-08 PROCEDURE — 99214 OFFICE O/P EST MOD 30 MIN: CPT | Mod: 25

## 2019-11-08 PROCEDURE — 90662 IIV NO PRSV INCREASED AG IM: CPT

## 2019-11-08 PROCEDURE — G0008: CPT

## 2019-11-08 NOTE — PLAN
[FreeTextEntry1] : patient with recent ECHO and CXR WNL\par \par In regards to patient's back pain, likely musculoskeletal, will prescribe cyclobenzaprine PO QHS PRN. Patient advised to stretch and apply heat as needed. \par \par Patient received flu vaccine today. Patient advised of adverse effects of medication including but not limited to muscle soreness at site of injection and general malaise \par \par Counseling included abnormal lab results, differential diagnoses, treatment options, risks and benefits, lifestyle changes, prognosis, current condition, medications, and dose adjustments. \par The patient was interactive, attentive, asked questions, and verbalized understanding

## 2019-11-08 NOTE — HISTORY OF PRESENT ILLNESS
[FreeTextEntry1] : back pain / sob [de-identified] : Ms. EL SHER is a 85 year female with a PMH of HTN, COPD on Home O2 comes to the office  c/o cough and back pain. Patient denies urinary incontinence, bowel incontinence, leg weakness or saddle anesthesia. No other complaints at this time.

## 2019-11-12 PROCEDURE — 94760 N-INVAS EAR/PLS OXIMETRY 1: CPT

## 2019-11-12 PROCEDURE — 71250 CT THORAX DX C-: CPT

## 2019-11-12 PROCEDURE — 81001 URINALYSIS AUTO W/SCOPE: CPT

## 2019-11-12 PROCEDURE — 96375 TX/PRO/DX INJ NEW DRUG ADDON: CPT

## 2019-11-12 PROCEDURE — 87581 M.PNEUMON DNA AMP PROBE: CPT

## 2019-11-12 PROCEDURE — 87449 NOS EACH ORGANISM AG IA: CPT

## 2019-11-12 PROCEDURE — 87631 RESP VIRUS 3-5 TARGETS: CPT

## 2019-11-12 PROCEDURE — 84132 ASSAY OF SERUM POTASSIUM: CPT

## 2019-11-12 PROCEDURE — 36415 COLL VENOUS BLD VENIPUNCTURE: CPT

## 2019-11-12 PROCEDURE — 93005 ELECTROCARDIOGRAM TRACING: CPT

## 2019-11-12 PROCEDURE — 71045 X-RAY EXAM CHEST 1 VIEW: CPT

## 2019-11-12 PROCEDURE — 85014 HEMATOCRIT: CPT

## 2019-11-12 PROCEDURE — 87040 BLOOD CULTURE FOR BACTERIA: CPT

## 2019-11-12 PROCEDURE — 84484 ASSAY OF TROPONIN QUANT: CPT

## 2019-11-12 PROCEDURE — 82435 ASSAY OF BLOOD CHLORIDE: CPT

## 2019-11-12 PROCEDURE — 87633 RESP VIRUS 12-25 TARGETS: CPT

## 2019-11-12 PROCEDURE — 80048 BASIC METABOLIC PNL TOTAL CA: CPT

## 2019-11-12 PROCEDURE — 96365 THER/PROPH/DIAG IV INF INIT: CPT

## 2019-11-12 PROCEDURE — 84145 PROCALCITONIN (PCT): CPT

## 2019-11-12 PROCEDURE — 83605 ASSAY OF LACTIC ACID: CPT

## 2019-11-12 PROCEDURE — 85730 THROMBOPLASTIN TIME PARTIAL: CPT

## 2019-11-12 PROCEDURE — 82803 BLOOD GASES ANY COMBINATION: CPT

## 2019-11-12 PROCEDURE — 87086 URINE CULTURE/COLONY COUNT: CPT

## 2019-11-12 PROCEDURE — 94640 AIRWAY INHALATION TREATMENT: CPT

## 2019-11-12 PROCEDURE — 84100 ASSAY OF PHOSPHORUS: CPT

## 2019-11-12 PROCEDURE — 83880 ASSAY OF NATRIURETIC PEPTIDE: CPT

## 2019-11-12 PROCEDURE — 80053 COMPREHEN METABOLIC PANEL: CPT

## 2019-11-12 PROCEDURE — 87798 DETECT AGENT NOS DNA AMP: CPT

## 2019-11-12 PROCEDURE — 83735 ASSAY OF MAGNESIUM: CPT

## 2019-11-12 PROCEDURE — 85027 COMPLETE CBC AUTOMATED: CPT

## 2019-11-12 PROCEDURE — 84295 ASSAY OF SERUM SODIUM: CPT

## 2019-11-12 PROCEDURE — 85610 PROTHROMBIN TIME: CPT

## 2019-11-12 PROCEDURE — 82947 ASSAY GLUCOSE BLOOD QUANT: CPT

## 2019-11-12 PROCEDURE — 97163 PT EVAL HIGH COMPLEX 45 MIN: CPT

## 2019-11-12 PROCEDURE — 82330 ASSAY OF CALCIUM: CPT

## 2019-11-12 PROCEDURE — 99285 EMERGENCY DEPT VISIT HI MDM: CPT | Mod: 25

## 2019-11-12 PROCEDURE — 87486 CHLMYD PNEUM DNA AMP PROBE: CPT

## 2019-12-16 ENCOUNTER — APPOINTMENT (OUTPATIENT)
Dept: INTERNAL MEDICINE | Facility: CLINIC | Age: 84
End: 2019-12-16
Payer: MEDICARE

## 2019-12-16 VITALS
WEIGHT: 122 LBS | DIASTOLIC BLOOD PRESSURE: 70 MMHG | BODY MASS INDEX: 22.45 KG/M2 | HEIGHT: 62 IN | SYSTOLIC BLOOD PRESSURE: 120 MMHG

## 2019-12-16 DIAGNOSIS — R14.3 FLATULENCE: ICD-10-CM

## 2019-12-16 DIAGNOSIS — K59.00 CONSTIPATION, UNSPECIFIED: ICD-10-CM

## 2019-12-16 PROCEDURE — 99214 OFFICE O/P EST MOD 30 MIN: CPT

## 2019-12-16 NOTE — PLAN
[FreeTextEntry1] : In regards to patient's Constipation, patient will take lactulose.\par \par In regards to patient's excessive gas, patient will take simethicone.\par \par In regards to patient's COPD, patient will continue to follow with Pulmonologist \par \par \par Counseling included abnormal lab results, differential diagnoses, treatment options, risks and benefits, lifestyle changes, prognosis, current condition, medications, and dose adjustments. \par The patient was interactive, attentive, asked questions, and verbalized understanding

## 2019-12-16 NOTE — HISTORY OF PRESENT ILLNESS
[FreeTextEntry1] : bloating / back pain  [de-identified] : Ms. EL SHER is a 85 year female with a PMH of HTN, COPD on Home O2 comes to the office  c/o abdominal pain and constipation x 4 days. No other complaints at this time.

## 2019-12-30 ENCOUNTER — APPOINTMENT (OUTPATIENT)
Dept: PULMONOLOGY | Facility: CLINIC | Age: 84
End: 2019-12-30
Payer: MEDICARE

## 2019-12-30 VITALS
BODY MASS INDEX: 22.82 KG/M2 | DIASTOLIC BLOOD PRESSURE: 78 MMHG | HEART RATE: 80 BPM | SYSTOLIC BLOOD PRESSURE: 150 MMHG | OXYGEN SATURATION: 94 % | WEIGHT: 124 LBS | HEIGHT: 62 IN

## 2019-12-30 PROCEDURE — 99215 OFFICE O/P EST HI 40 MIN: CPT

## 2019-12-30 RX ORDER — CYCLOBENZAPRINE HYDROCHLORIDE 10 MG/1
10 TABLET, FILM COATED ORAL
Qty: 14 | Refills: 0 | Status: ACTIVE | COMMUNITY
Start: 2019-11-08

## 2019-12-30 NOTE — CONSULT LETTER
[Dear  ___] : Dear  [unfilled], [Consult Letter:] : I had the pleasure of evaluating your patient, [unfilled]. [Consult Closing:] : Thank you very much for allowing me to participate in the care of this patient.  If you have any questions, please do not hesitate to contact me. [Please see my note below.] : Please see my note below. [Sincerely,] : Sincerely, [FreeTextEntry3] : Oziel Mejia DO [facility]\par Pulmonary Critical Care\par Director Pulmonary Division\par Medical Director Respiratory Therapy\par [facility]\par \par  [DrBecky  ___] : Dr. DENISE [Director, Respiratory Care] : Director, Respiratory Care [Oziel Mejia DO, [facility]] : Oziel Mejia DO, [facility] [[facility]] : [facility]

## 2019-12-30 NOTE — PHYSICAL EXAM
[General Appearance - Well Developed] : well developed [General Appearance - Well Nourished] : well nourished [Neck Appearance] : the appearance of the neck was normal [Heart Rate And Rhythm] : heart rate and rhythm were normal [Heart Sounds] : normal S1 and S2 [Respiration, Rhythm And Depth] : normal respiratory rhythm and effort [Exaggerated Use Of Accessory Muscles For Inspiration] : no accessory muscle use [] : no rash [Abdomen Tenderness] : non-tender [No Focal Deficits] : no focal deficits [Oriented To Time, Place, And Person] : oriented to person, place, and time [Impaired Insight] : insight and judgment were intact [Affect] : the affect was normal [Memory Recent] : recent memory was not impaired [FreeTextEntry1] : no edema

## 2019-12-30 NOTE — HISTORY OF PRESENT ILLNESS
[FreeTextEntry1] : having back pain,  recent fall off chair\par mechanism was turning/twisting injury\par no better\par sputum is whitish\par at baseline\par no fever, chill, chest pain

## 2019-12-30 NOTE — CONSULT LETTER
[Dear  ___] : Dear  [unfilled], [Consult Letter:] : I had the pleasure of evaluating your patient, [unfilled]. [Please see my note below.] : Please see my note below. [Consult Closing:] : Thank you very much for allowing me to participate in the care of this patient.  If you have any questions, please do not hesitate to contact me. [FreeTextEntry3] : [facility] DO [facility]\par Pulmonary Critical Care\par Director Pulmonary Division\par Medical Director Respiratory Therapy\par [facility]\par \par  [Sincerely,] : Sincerely, [DrBecky  ___] : Dr. DENISE [Director, Respiratory Care] : Director, Respiratory Care [[facility] DO, [facility]] : [facility] DO, [facility] [[facility]] : [facility]

## 2019-12-30 NOTE — DISCUSSION/SUMMARY
[FreeTextEntry1] : COPD Gold class III -IV, Post TAVR, smoke free\par bothered mostly by musculoskeletal type pain\par echocardiogram post TAVR\par lung exam without bronchospasm, adequate 02 , has home 02\par compliance with Anoro daily, duo neb bid and prn\par continued Smoking cessation \par CT scan reviewed 9/19, will obtain CXR\par to seek further attention for back if no improvement\par vaccinations this fall\par 3 months or sooner if needed\par \par

## 2020-01-07 ENCOUNTER — FORM ENCOUNTER (OUTPATIENT)
Age: 85
End: 2020-01-07

## 2020-01-08 ENCOUNTER — APPOINTMENT (OUTPATIENT)
Dept: ULTRASOUND IMAGING | Facility: CLINIC | Age: 85
End: 2020-01-08
Payer: MEDICARE

## 2020-01-08 ENCOUNTER — OUTPATIENT (OUTPATIENT)
Dept: OUTPATIENT SERVICES | Facility: HOSPITAL | Age: 85
LOS: 1 days | End: 2020-01-08
Payer: MEDICARE

## 2020-01-08 DIAGNOSIS — Z96.619 PRESENCE OF UNSPECIFIED ARTIFICIAL SHOULDER JOINT: Chronic | ICD-10-CM

## 2020-01-08 DIAGNOSIS — Z98.890 OTHER SPECIFIED POSTPROCEDURAL STATES: Chronic | ICD-10-CM

## 2020-01-08 DIAGNOSIS — K76.89 OTHER SPECIFIED DISEASES OF LIVER: Chronic | ICD-10-CM

## 2020-01-08 DIAGNOSIS — J44.9 CHRONIC OBSTRUCTIVE PULMONARY DISEASE, UNSPECIFIED: ICD-10-CM

## 2020-01-08 PROCEDURE — 71046 X-RAY EXAM CHEST 2 VIEWS: CPT

## 2020-01-08 PROCEDURE — 93970 EXTREMITY STUDY: CPT | Mod: 26

## 2020-01-08 PROCEDURE — 71046 X-RAY EXAM CHEST 2 VIEWS: CPT | Mod: 26

## 2020-01-08 PROCEDURE — 93970 EXTREMITY STUDY: CPT

## 2020-01-09 ENCOUNTER — MESSAGE (OUTPATIENT)
Age: 85
End: 2020-01-09

## 2020-01-13 NOTE — REVIEW OF SYSTEMS
Progress Note - Orthopedics   [facility] 80 y o  [unfilled] MRN: [id number]  Unit/Bed#: [facility] 526-01    Subjective:  POD15 S/P Right  hip cannulated screws for fracture  Pt doing well  Pain controlled  No O/N events  Physical Exam:  There were no vitals filed for this visit  RLE:  Dressings C/D/I  SILT L1/S1  +motor EHL/FHL  2+ DP pulse    0   Lab Value Date/Time    HCT 35 8 12/31/2019 0439    HGB 11 5 12/31/2019 0439    INR 1 16 12/28/2019 0439    WBC 7 28 12/31/2019 0439         Assessment:  POD15 S/P Right  hip cannulated screws for fracture   Doing well    Plan:  1) WBAT  2) OOB  3) NO  hip precautions needed  4) DVT ppx  5) analgesics  6) PT/OT    Xray right hip tomorrow    F/u with Dr Augustus Councilman if office in 4 weeks [As Noted in HPI] : as noted in HPI [Negative] : Constitutional

## 2020-01-15 ENCOUNTER — APPOINTMENT (OUTPATIENT)
Dept: INTERNAL MEDICINE | Facility: CLINIC | Age: 85
End: 2020-01-15

## 2020-01-29 ENCOUNTER — APPOINTMENT (OUTPATIENT)
Dept: PULMONOLOGY | Facility: CLINIC | Age: 85
End: 2020-01-29
Payer: MEDICARE

## 2020-01-29 VITALS
WEIGHT: 121 LBS | HEART RATE: 75 BPM | DIASTOLIC BLOOD PRESSURE: 64 MMHG | BODY MASS INDEX: 24.72 KG/M2 | OXYGEN SATURATION: 94 % | HEIGHT: 58.5 IN | SYSTOLIC BLOOD PRESSURE: 122 MMHG

## 2020-01-29 DIAGNOSIS — J47.9 BRONCHIECTASIS, UNCOMPLICATED: ICD-10-CM

## 2020-01-29 DIAGNOSIS — R91.8 OTHER NONSPECIFIC ABNORMAL FINDING OF LUNG FIELD: ICD-10-CM

## 2020-01-29 DIAGNOSIS — R09.02 HYPOXEMIA: ICD-10-CM

## 2020-01-29 PROCEDURE — 99215 OFFICE O/P EST HI 40 MIN: CPT

## 2020-01-29 RX ORDER — LACTULOSE 10 G/15ML
20 SOLUTION ORAL DAILY
Qty: 3 | Refills: 0 | Status: DISCONTINUED | COMMUNITY
Start: 2019-12-16 | End: 2020-01-29

## 2020-01-29 RX ORDER — SENNOSIDES 8.6 MG TABLETS 8.6 MG/1
TABLET ORAL
Refills: 0 | Status: ACTIVE | COMMUNITY

## 2020-01-29 RX ORDER — DOCUSATE SODIUM 100 MG/1
100 CAPSULE ORAL
Refills: 0 | Status: ACTIVE | COMMUNITY

## 2020-01-29 RX ORDER — ACETAMINOPHEN 325 MG/1
325 TABLET, FILM COATED ORAL
Refills: 0 | Status: ACTIVE | COMMUNITY

## 2020-01-29 RX ORDER — SIMETHICONE 180 MG
180 CAPSULE ORAL 4 TIMES DAILY
Qty: 60 | Refills: 0 | Status: DISCONTINUED | COMMUNITY
Start: 2019-12-16 | End: 2020-01-29

## 2020-01-29 RX ORDER — DOXYCYCLINE HYCLATE 100 MG/1
100 CAPSULE ORAL
Qty: 10 | Refills: 4 | Status: DISCONTINUED | COMMUNITY
Start: 2019-12-30 | End: 2020-01-29

## 2020-01-29 RX ORDER — IPRATROPIUM BROMIDE AND ALBUTEROL SULFATE 2.5; .5 MG/3ML; MG/3ML
0.5-2.5 (3) SOLUTION RESPIRATORY (INHALATION) 4 TIMES DAILY
Qty: 1 | Refills: 5 | Status: ACTIVE | COMMUNITY
Start: 2020-01-29 | End: 1900-01-01

## 2020-01-29 RX ORDER — SACCHAROMYCES BOULARDII 50 MG
250 CAPSULE ORAL TWICE DAILY
Qty: 60 | Refills: 0 | Status: DISCONTINUED | COMMUNITY
Start: 2019-10-03 | End: 2020-01-29

## 2020-01-29 RX ORDER — PREDNISONE 10 MG/1
10 TABLET ORAL
Qty: 30 | Refills: 6 | Status: DISCONTINUED | COMMUNITY
Start: 2019-12-30 | End: 2020-01-29

## 2020-01-29 NOTE — DISCUSSION/SUMMARY
[FreeTextEntry1] : COPD Gold class III -IV, Post TAVR, smoke free\par by hx compression fracture and ? 'CHF"\par by report CTA negative and no infiltrates, unable to view\par lung exam without bronchospasm, on  home 02\par compliance with Anoro daily, duo neb bid and prn\par action plan reviewed, pred taper and abx\par continued Smoking cessation \par Does not want ER\par vaccinations this fall\par 3 weeks  or sooner if needed\par \par

## 2020-01-29 NOTE — PHYSICAL EXAM
[General Appearance - Well Developed] : well developed [General Appearance - Well Nourished] : well nourished [Neck Appearance] : the appearance of the neck was normal [Heart Rate And Rhythm] : heart rate and rhythm were normal [Heart Sounds] : normal S1 and S2 [Respiration, Rhythm And Depth] : normal respiratory rhythm and effort [Exaggerated Use Of Accessory Muscles For Inspiration] : no accessory muscle use [Abdomen Tenderness] : non-tender [] : no rash [No Focal Deficits] : no focal deficits [Impaired Insight] : insight and judgment were intact [Affect] : the affect was normal [Oriented To Time, Place, And Person] : oriented to person, place, and time [Memory Recent] : recent memory was not impaired [FreeTextEntry1] : no edema

## 2020-01-29 NOTE — HISTORY OF PRESENT ILLNESS
[FreeTextEntry1] : recent admission [facility]\par discharged 1/22 for back pain and CHF by hx\par pain and dsypnea improved\par no fever, chill,\par using Anoro and nebulizer\par 02 3 liters\par smoke free

## 2020-01-29 NOTE — REASON FOR VISIT
[Abnormal CT Scan] : abnormal CT Scan [Shortness of Breath] : shortness of Breath [COPD] : COPD [Follow-Up - From Hospitalization] : a hospitalization follow-up

## 2020-01-29 NOTE — CONSULT LETTER
[Dear  ___] : Dear  [unfilled], [Please see my note below.] : Please see my note below. [Consult Letter:] : I had the pleasure of evaluating your patient, [unfilled]. [DrBecky  ___] : Dr. DENISE [Sincerely,] : Sincerely, [Consult Closing:] : Thank you very much for allowing me to participate in the care of this patient.  If you have any questions, please do not hesitate to contact me. [Director, Respiratory Care] : Director, Respiratory Care [[facility], [facility]] : [facility], [facility] [[facility]] : [facility] [FreeTextEntry3] : [facility] DO [facility]\par Pulmonary Critical Care\par Director Pulmonary Division\par Medical Director Respiratory Therapy\par [facility]\par \par

## 2020-02-03 ENCOUNTER — APPOINTMENT (OUTPATIENT)
Dept: INTERNAL MEDICINE | Facility: CLINIC | Age: 85
End: 2020-02-03
Payer: MEDICARE

## 2020-02-03 VITALS
BODY MASS INDEX: 25.61 KG/M2 | SYSTOLIC BLOOD PRESSURE: 170 MMHG | WEIGHT: 122 LBS | HEIGHT: 58 IN | DIASTOLIC BLOOD PRESSURE: 75 MMHG

## 2020-02-03 VITALS — SYSTOLIC BLOOD PRESSURE: 140 MMHG | DIASTOLIC BLOOD PRESSURE: 70 MMHG

## 2020-02-03 DIAGNOSIS — E78.5 HYPERLIPIDEMIA, UNSPECIFIED: ICD-10-CM

## 2020-02-03 DIAGNOSIS — J18.9 PNEUMONIA, UNSPECIFIED ORGANISM: ICD-10-CM

## 2020-02-03 DIAGNOSIS — J44.9 CHRONIC OBSTRUCTIVE PULMONARY DISEASE, UNSPECIFIED: ICD-10-CM

## 2020-02-03 DIAGNOSIS — G47.00 INSOMNIA, UNSPECIFIED: ICD-10-CM

## 2020-02-03 DIAGNOSIS — F32.9 MAJOR DEPRESSIVE DISORDER, SINGLE EPISODE, UNSPECIFIED: ICD-10-CM

## 2020-02-03 DIAGNOSIS — Z99.81 DEPENDENCE ON SUPPLEMENTAL OXYGEN: ICD-10-CM

## 2020-02-03 PROCEDURE — 99214 OFFICE O/P EST MOD 30 MIN: CPT | Mod: 25

## 2020-02-03 PROCEDURE — 99358 PROLONG SERVICE W/O CONTACT: CPT

## 2020-02-03 RX ORDER — SERTRALINE HYDROCHLORIDE 50 MG/1
50 TABLET, FILM COATED ORAL
Qty: 90 | Refills: 3 | Status: ACTIVE | COMMUNITY
Start: 2020-02-03 | End: 1900-01-01

## 2020-02-03 NOTE — HISTORY OF PRESENT ILLNESS
[FreeTextEntry1] : Hospital  Follow Up [de-identified] : PT HERE FOR FOLLOW UP FEELS OK  S/P   TAVR  3   OVERALL FEELS OK\par PT HAS COPD AND USES O2   WAS [facility] \[facility]   FOR COPD EXACERBATION IN AND PNEUMONIA NOW STILL FEELS FATIGUED HASSEEN DR WALTON  HERE WITH DAUGHTER \par ON CONTINUOUS 02

## 2020-02-03 NOTE — REVIEW OF SYSTEMS
[Palpitations] : palpitations [Lower Ext Edema] : lower extremity edema [Shortness Of Breath] : shortness of breath [Dyspnea on Exertion] : dyspnea on exertion [Negative] : Heme/Lymph

## 2020-02-03 NOTE — PLAN
[FreeTextEntry1] : PT HERE FOR FOLLOW UP FEELS OK  S/P   TAVR  3/27 DR SAUCEDO  OVERALL FEELSOK\par PT HAS COPD  WAS IN [facility] FOR COPD EXACERBATION AND POSSIBLE PNEUMONIA\par HERE  FOR FOLLOW U P FEELSOK I  SAW DR WALTON \par NFEELW WEAK AND TIRED DECONDITIONED ON Continous HOME 02 \par WILL FOLLOW UP  AND D/W PULMONARY\par \par EXTENSIVE REVIEW OF HOSP RECORDS INCLUDING CONSULT LABS AND IMAGING\par DURATION OF REVIEW 35 MINUTES

## 2020-02-27 ENCOUNTER — OUTPATIENT (OUTPATIENT)
Dept: OUTPATIENT SERVICES | Facility: HOSPITAL | Age: 85
LOS: 1 days | End: 2020-02-27
Payer: MEDICARE

## 2020-02-27 ENCOUNTER — APPOINTMENT (OUTPATIENT)
Dept: CT IMAGING | Facility: CLINIC | Age: 85
End: 2020-02-27
Payer: MEDICARE

## 2020-02-27 DIAGNOSIS — Z00.00 ENCOUNTER FOR GENERAL ADULT MEDICAL EXAMINATION WITHOUT ABNORMAL FINDINGS: ICD-10-CM

## 2020-02-27 DIAGNOSIS — K76.89 OTHER SPECIFIED DISEASES OF LIVER: Chronic | ICD-10-CM

## 2020-02-27 DIAGNOSIS — Z98.890 OTHER SPECIFIED POSTPROCEDURAL STATES: Chronic | ICD-10-CM

## 2020-02-27 DIAGNOSIS — Z96.619 PRESENCE OF UNSPECIFIED ARTIFICIAL SHOULDER JOINT: Chronic | ICD-10-CM

## 2020-02-27 PROCEDURE — 82565 ASSAY OF CREATININE: CPT

## 2020-02-27 PROCEDURE — 74177 CT ABD & PELVIS W/CONTRAST: CPT | Mod: 26

## 2020-02-27 PROCEDURE — 74177 CT ABD & PELVIS W/CONTRAST: CPT

## 2020-03-04 ENCOUNTER — APPOINTMENT (OUTPATIENT)
Dept: INTERNAL MEDICINE | Facility: CLINIC | Age: 85
End: 2020-03-04

## 2020-03-20 ENCOUNTER — OUTPATIENT (OUTPATIENT)
Dept: OUTPATIENT SERVICES | Facility: HOSPITAL | Age: 85
LOS: 1 days | End: 2020-03-20
Payer: MEDICARE

## 2020-03-20 ENCOUNTER — APPOINTMENT (OUTPATIENT)
Dept: CT IMAGING | Facility: CLINIC | Age: 85
End: 2020-03-20
Payer: MEDICARE

## 2020-03-20 DIAGNOSIS — K76.89 OTHER SPECIFIED DISEASES OF LIVER: Chronic | ICD-10-CM

## 2020-03-20 DIAGNOSIS — R07.9 CHEST PAIN, UNSPECIFIED: ICD-10-CM

## 2020-03-20 DIAGNOSIS — Z98.890 OTHER SPECIFIED POSTPROCEDURAL STATES: Chronic | ICD-10-CM

## 2020-03-20 DIAGNOSIS — Z96.619 PRESENCE OF UNSPECIFIED ARTIFICIAL SHOULDER JOINT: Chronic | ICD-10-CM

## 2020-03-20 PROCEDURE — 82565 ASSAY OF CREATININE: CPT

## 2020-03-20 PROCEDURE — 71275 CT ANGIOGRAPHY CHEST: CPT | Mod: 26

## 2020-03-20 PROCEDURE — 71275 CT ANGIOGRAPHY CHEST: CPT

## 2020-03-28 RX ORDER — PREDNISONE 10 MG/1
10 TABLET ORAL
Qty: 21 | Refills: 0 | Status: ACTIVE | COMMUNITY
Start: 2020-03-28 | End: 1900-01-01

## 2020-05-06 ENCOUNTER — APPOINTMENT (OUTPATIENT)
Dept: PULMONOLOGY | Facility: CLINIC | Age: 85
End: 2020-05-06

## 2021-07-28 NOTE — PATIENT PROFILE ADULT - NSTOBACCOWITHDRW_GEN_A_CORE_SD
Priyanka Miller from respiratory therapy in room. Patients o2 sat on 15l n/c 82-85%. Patient stating he cant breathe. Patient NT suctioned for moderate amount thick tenacious bloody secretions with no improvement in O2 sat. Rapid response called. NONE

## 2022-12-19 NOTE — PATIENT PROFILE ADULT - NSASFUNCLEVELADLAMBULATE_GEN_A_NUR
Progress Note - Death in Restraints   Sunshine Calderon 80 y o  male MRN: [id number]  Unit/Bed#: [facility] 431-01 Encounter: [id number]      Patient  within 24 hours of restraint  with Soft restraint right wrist and Soft restraint left wrist  Death unrelated to use of restraints  This situation was tracked internally  CMS and PA-DAMION  notification not required 
2 = assistive person

## 2023-06-14 NOTE — ASU PATIENT PROFILE, ADULT - NS TRANSFER DENTURES
Full Xolair Counseling:  Patient informed of potential adverse effects including but not limited to fever, muscle aches, rash and allergic reactions.  The patient verbalized understanding of the proper use and possible adverse effects of Xolair.  All of the patient's questions and concerns were addressed.

## 2023-08-02 NOTE — PHYSICAL THERAPY INITIAL EVALUATION ADULT - WORK/LEISURE ACTIVITY, REHAB EVAL
"  Consult Note  Palliative Care    The patient location is: [place]  The chief complaint leading to consultation is: symptom mgt/ACP    Visit type: audiovisual    Face to Face time with patient: 40    90 minutes of total time spent on the encounter, which includes face to face time and non-face to face time preparing to see the patient (eg, review of tests), Obtaining and/or reviewing separately obtained history, Documenting clinical information in the electronic or other health record, Independently interpreting results (not separately reported) and communicating results to the patient/family/caregiver, or Care coordination (not separately reported).     Each patient to whom he or she provides medical services by telemedicine is:  (1) informed of the relationship between the physician and patient and the respective role of any other health care provider with respect to management of the patient; and (2) notified that he or she may decline to receive medical services by telemedicine and may withdraw from such care at any time.    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 Gagnon  - completed palliative RT to brain w positive response/functional improvement  - now considering keytruda vs observation    Encounter for palliative care  - Patient is decisional  - Patient accompanied today by son, off screen  - ACP documents are not uploaded into EMR, will discuss at future visit   - Philosophy of Palliative Medicine reviewed with patient and family at first visit  - New patient folder given to and reviewed with patient and family at first visit  - Goals of care: Would like to pursue treatment options that extend his life while maintaining current cognitive function. 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 He is happy with his current life and would like to maintain this as much as possible, though he would like to walk without a walker again. His biggest worry is having a smaller window of time to complete these goals.      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/carpel tunnel in wrists/cancer related pain  - pt c/o neuropathy in BLE's as well as carpel tunnel syndrome, these are chronic concerns, will defer to PCP and pain mgmt for treatment   - reports he previously had some transient abdominal pain but that has resolved  - no intervention needed at this time, does not like to take medicine  - will continue to monitor     Insomnia  - chronic  - self-described "night owl"   - stays up at night working on projects  - does not seem overly bothersome at this time  - will continue to monitor     Understanding of illness: good    Goals of care: Palliative. Patient would like to live as long as possible while retaining the mental capacity to continue his creative pursuits.    Follow up: 4-6 weeks    Patient's encounter and above plan of care discussed with patient's oncologist.     SUBJECTIVE:     History of Present Illness:  Patient is a 89 y.o. 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 Please " "see oncology notes for full oncologic history and treatment course.     08/02/2023:  FARRAH GUZMAN reviewed and summarized: No surprises    Patient presents via virtual visit with son off-screen. Patient is pleasant and easy to engage, his son assists with r/t [place]. States he is doing well physically, minimal cancer-related symptoms. He states emphatically that he is happy with his current life and wants to continue living as long as possible. He describes his creative pursuits at length, they are very important to him. He quotes his own limericks throughout our visit. He says that he has many goals for himself and needs more time (living) to complete them. 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 Regarding treatment he states: "If I gotta to go to [facility], I will do that." He values his marriage and hopes to see several more anniversaries. He recently underwent  palliative WBRT with a positive response. Maintaining cognitive function is important to him, especially as it relates to his creativity. Plans to discuss moving forward with keytruda vs observation w oncology this week, his children are concerned with potential side-effects. --- Of note, he has had dark stools since Monday. His son states that oncology is aware and will discuss with him at clinic visit tomorrow.     Mallory Symptom Assessment System    Pain Score: Four  Tiredness Score: 0  Nausea Score: 0  Depression Score: 3  Anxiety Score: 0  Appetite Score: 0  Dyspnea Score: 5    Past Medical History:   Diagnosis Date    Anticoagulated on Coumadin 01/10/2013    Arthritis of both knees 10/31/2013    Bradycardia 01/10/2013    Chronic systolic congestive heart failure, NYHA class 2 04/03/2015    Elevated PSA     Enlarged prostate     Frequent PVCs 04/02/2015    Gastritis 11/11/2015    EGD 5/15 with Jae    GERD (gastroesophageal reflux disease) " 01/10/2013    GI hemorrhage     History of nuclear stress test 04/08/2015    Normal perfusion but EF 48%, echo about the same, 4/15    [place] (hard of hearing)     Inguinal hernia recurrent unilateral 01/10/2013    Iron deficiency anemia 11/11/2015    EGD and Colon 5/15 without cause- capsule study if remains iron def per Dr Rowe    Long term (current) use of anticoagulants 09/10/2013    Neuropathy 01/10/2013    Personal history of DVT (deep vein thrombosis) 01/10/2013    8/10    Right-sided chest wall pain 10/31/2013    Rotator cuff syndrome of left shoulder 10/31/2013     Past Surgical History:   Procedure Laterality Date    EPIDURAL STEROID INJECTION Bilateral 8/28/2020    Procedure: Knee Peripheral Nerve Blocks;  Surgeon: Jayjay Nicholson Jr., MD;  Location: [facility];  Service: Pain Management;  Laterality: Bilateral;  Bilat knee nerve blocks  Arrive @ 0645 (requested); Coumadin not held; No DM    ESOPHAGOGASTRODUODENOSCOPY N/A 4/13/2023    Procedure: EGD (ESOPHAGOGASTRODUODENOSCOPY);  Surgeon: Suzi Pedro MD;  Location: [facility];  Service: Endoscopy;  Laterality: N/A;    ESOPHAGOGASTRODUODENOSCOPY N/A 6/13/2023    Procedure: EGD (ESOPHAGOGASTRODUODENOSCOPY);  Surgeon: Liborio Spencer MD;  Location: [facility];  Service: Endoscopy;  Laterality: N/A;    ESOPHAGOGASTRODUODENOSCOPY N/A 6/27/2023    Procedure: EGD (ESOPHAGOGASTRODUODENOSCOPY);  Surgeon: Maximiliano Giordano MD;  Location: [facility] ([address] [place] [address]);  Service: Endoscopy;  Laterality: N/A;    HERNIA REPAIR      PROSTATE SURGERY      suregry via rectum to reduce size of prostate     Family History   Problem Relation Age of Onset    COPD Mother     Hyperlipidemia Father     Cancer Sister      Review of patient's allergies indicates:   Allergen Reactions    Bactrim [sulfamethoxazole-trimethoprim]      Upset stomach and diarrhea, not likely allergic but unpleasant adverse reaction    Chlorpheniramine-phenylpropan Other (See Comments)        Medications:    Current Outpatient Medications:     acetaminophen (TYLENOL) 325 MG tablet, Take 2 tablets (650 mg total) by mouth every 6 (six) hours as needed for Pain., Disp: , Rfl: 0    amiodarone (PACERONE) 200 MG Tab, Take 200 mg by mouth once daily., Disp: , Rfl:     ascorbic acid, vitamin C, (VITAMIN C) 100 MG tablet, Take 100 mg by mouth once daily., Disp: , Rfl:     biotin 1 mg tablet, Take 1,000 mcg by mouth once daily., Disp: , Rfl:     calcium-vitamin D3 (OS-TONY 500 + D3) 500 mg-5 mcg (200 unit) per tablet, Take 1 tablet by mouth 2 (two) times daily with meals., Disp: , Rfl:     cyanocobalamin (VITAMIN B-12) 100 MCG tablet, Take 100 mcg by mouth once daily., Disp: , Rfl:     dexAMETHasone (DECADRON) 2 MG tablet, Take 3 tablets (6 mg total) by mouth once daily for 7 days, THEN 2 tablets (4 mg total) once daily for 7 days, THEN 1 tablet (2 mg total) once daily for 7 days, THEN 0.5 tablets (1 mg total) once daily for 7 days. TAKE 1-2 TABLETS BY MOUTH DAILY AS DIRECTED., Disp: 46 tablet, Rfl: 0    ferrous sulfate 325 (65 FE) MG EC tablet, Take 325 mg by mouth 3 (three) times daily with meals., Disp: , Rfl:     furosemide (LASIX) 20 MG tablet, 1-2 pills once or twice daily as directed physician, Disp: 120 tablet, Rfl: 11    LORazepam (ATIVAN) 0.5 MG tablet, Half to one every 6hrs prn panic attacks/ SOB or to  prevent attacks, Disp: 60 tablet, Rfl: 5    metoprolol succinate (TOPROL-XL) 50 MG 24 hr tablet, Take 1 tablet (50 mg total) by mouth once daily., Disp: 90 tablet, Rfl: 3    omeprazole (PRILOSEC OTC) 20 MG tablet, Take 2 tablets (40 mg total) by mouth once daily. (Patient taking differently: Take 40 mg by mouth once daily. TAKE 2 CAPSULES BY MOUTH EVERY DAY), Disp: 60 tablet, Rfl: 11    ondansetron (ZOFRAN) 8 MG tablet, Take 1 tablet (8 mg total) by mouth every 8 (eight) hours as needed for Nausea., Disp: 90 tablet, Rfl: 0    promethazine (PHENERGAN) 12.5 MG Tab, Take 2 tablets (25 mg total) by  mouth every 6 (six) hours as needed., Disp: 40 tablet, Rfl: 5    promethazine (PHENERGAN) 25 mg/mL injection, Inject 1 mL (25 mg total) into the muscle every 6 (six) hours as needed., Disp: 3 mL, Rfl: 6    ramipriL (ALTACE) 5 MG capsule, TAKE 1 CAPSULE BY MOUTH EVERY DAY, Disp: 90 capsule, Rfl: 1    vitamin E 100 UNIT capsule, Take 100 Units by mouth once daily., Disp: , Rfl:     zinc gluconate 50 mg tablet, Take 50 mg by mouth once daily., Disp: , Rfl:     OBJECTIVE:       ROS:  Review of Systems    Review of Symptoms      Symptom Assessment (ESAS 0-10 Scale)  Pain:  4  Dyspnea:  5  Anxiety:  0  Nausea:  0  Depression:  3  Anorexia:  0  Fatigue:  0  Insomnia:  0  Restlessness:  0  Agitation:  0     CAM / Delirium:  Negative  Constipation:  Negative  Diarrhea:  Negative      Bowel Management Plan (BMP):  No      Pain Assessment:  OME in 24 hours:  0  Location(s): none      Modified Gibran Scale:  0    ECOG Performance Status rdGrdrrdarddrderd:rd rd3rd Living Arrangements:  Lives with spouse    Psychosocial/Cultural:   See Palliative Psychosocial Note: Yes  **Primary  to Follow**  Palliative Care  Consult: No    Spiritual:  F - Zita and Belief:  Endorses  I - Importance:  High      Time-Based Charting:  No      Advance Care Planning   Advance Directives:   Living Will: No        Oral Declaration: No    LaPOST: No    Do Not Resuscitate Status: Yes    Medical Power of : No        Oral Declaration: No      Decision Making:  Patient answered questions  Goals of Care: What is most important right now is to focus on quality of life, even if it means sacrificing a little time, improvement in condition but with limits to invasive therapies. Accordingly, we have decided that the best plan to meet the patient's goals includes continuing with treatment.              Physical Exam:  Vitals:  Virtual visit, no vitals available   Physical Exam  Constitutional:       General: He is not in acute distress.      "Appearance: Normal appearance. He is not ill-appearing, toxic-appearing or diaphoretic.   HENT:      Head: Normocephalic and atraumatic.      Right Ear: External ear normal.      Left Ear: External ear normal.      Nose: Nose normal.   Eyes:      Extraocular Movements: Extraocular movements intact.      Conjunctiva/sclera: Conjunctivae normal.   Pulmonary:      Effort: Pulmonary effort is normal. No respiratory distress.      Breath sounds: No stridor.   Musculoskeletal:      Cervical back: Normal range of motion.      Comments: Seated upright without assistance    Skin:     Coloration: Skin is pale. Skin is not jaundiced.   Neurological:      Mental Status: He is alert and oriented to person, place, and time. Mental status is at baseline.   Psychiatric:         Mood and Affect: Mood normal.         Behavior: Behavior normal.         Thought Content: Thought content normal.         Judgment: Judgment normal.         Labs:  CBC:   WBC   Date Value Ref Range Status   07/28/2023 15.78 (H) 3.90 - 12.70 K/uL Final     Hemoglobin   Date Value Ref Range Status   07/28/2023 8.8 (L) 14.0 - 18.0 g/dL Final     Hematocrit   Date Value Ref Range Status   07/28/2023 27.1 (L) 40.0 - 54.0 % Final     MCV   Date Value Ref Range Status   07/28/2023 90 82 - 98 fL Final     Platelets   Date Value Ref Range Status   07/28/2023 161 150 - 450 K/uL Final       LFT:   Lab Results   Component Value Date    AST 17 07/28/2023    ALKPHOS 84 07/28/2023    BILITOT 0.3 07/28/2023       Albumin:   Albumin   Date Value Ref Range Status   07/28/2023 3.0 (L) 3.5 - 5.2 g/dL Final     Protein:   Total Protein   Date Value Ref Range Status   07/28/2023 6.1 6.0 - 8.4 g/dL Final       Radiology:I have reviewed all pertinent imaging results/findings within the past 24 hours.    07/10/2023 CT heads: "Impression:     Stable appearance of the brain with the right temporal lobe lesion again identified.  No new intracranial process."       06/07/2023 CT AP: " ""Impression:     Pleural effusions associated with compressive atelectasis.     Reticular opacities within lung bases, possibly basilar interstitial edema or postinflammatory in nature.     Small-sized hiatal hernia.     Rectal fecal impaction"    06/22/2023 MRI brain: "Impression:     Peripherally enhancing lesion within the right temporal lobe with mass effect as discussed above.  No internal restricted diffusion to suggest abscess although this is not excluded.  An underlying neoplasm is thought more likely from an imaging perspective."    06/22/2023 CTA h/n: " Impression:     Motion degraded examination.     Peripherally enhancing mass lesion in the right temporal lobe with significant surrounding vasogenic edema concerning for metastatic disease given history of duodenal carcinoma.  Infection could have a similar appearance.  Mild localized mass effect with minimal leftward midline shift.  Difficult to exclude right temporal lobe infarction.  Further evaluation with MRI of the brain with and without contrast is recommended.     Moderate calcification of the bilateral carotid bulbs with less than 50% stenosis of the per NASCET criteria bilaterally.  No major vascular occlusion.  No aneurysm.     Left upper lobe ground-glass opacification with intra and interlobular septal thickening as well as moderate bilateral pleural effusions.  Subtle ground-glass opacities in the right upper lobe are additionally seen.  Findings are overall concerning for infectious/inflammatory change with pulmonary edema in the differentials.     Fusiform ectasia of the ascending thoracic aorta measuring up to 4.1 cm.     Heterogeneous enlargement of the left lobe of the thyroid gland with probable nodules.  Suggest follow-up with nonemergent thyroid ultrasound when clinically appropriate."    I spent a total of 90 minutes on the day of the visit.This includes face to face time in discussion of goals of care, symptom assessment, " coordination of care and emotional support.  This also includes non-face to face time preparing to see the patient (eg, review of tests/imaging), obtaining and/or reviewing separately obtained history, documenting clinical information in the electronic or other health record, independently interpreting results and communicating results to the patient/family/caregiver, or care coordinator.     A total of 15 min was spent on advance care planning, goals of care discussion, emotional support, formulating and communicating prognosis and goals of care, exploring burden/benefit of various approaches of treatment. This discussion occurred on a fully voluntary basis with the verbal consent of the patient and/or family    Signature: Petra Corbett DNP   independent

## 2025-04-14 NOTE — PROGRESS NOTE ADULT - SUBJECTIVE AND OBJECTIVE BOX
PHYSICAL THERAPY - MEDICARE DAILY TREATMENT NOTE (updated 3/23)      Date: 2025          Patient Name:  Dena Arellano :  1955   Medical   Diagnosis:  Status post left knee replacement [Z96.652] Treatment Diagnosis:  M25.562 LEFT KNEE PAIN    Referral Source:  Caryn Boyle PA-C Insurance:   Payor: MEDICARE / Plan: MEDICARE PART A AND B / Product Type: *No Product type* /                     Patient  verified yes     Visit #   Current  / Total 11 24   Time   In / Out 1133 1235   Total Treatment Time 62   Total Timed Codes 62   1:1 Treatment Time 39      [facility] Totals Reminder:  bill using total billable   min of TIMED therapeutic procedures and modalities.   8-22 min = 1 unit; 23-37 min = 2 units; 38-52 min = 3 units; 53-67 min = 4 units; 68-82 min = 5 units            SUBJECTIVE    Pain Level (0-10 scale): 1-2    Any medication changes, allergies to medications, adverse drug reactions, diagnosis change, or new procedure performed?: [x] No    [] Yes (see summary sheet for update)  Medications: Verified on Patient Summary List    Subjective functional status/changes:     Patient continues to note stiffness in the mornings but noted this has been slightly improving. Patient also noted they were able to work in their garden over the weekend and did well with this.     OBJECTIVE      Therapeutic Procedures:  Tx Min Billable or 1:1 Min (if diff from Tx Min) Procedure, Rationale, Specifics   [phone] [id number] Therapeutic Exercise (timed):  increase ROM, strength, coordination, balance, and proprioception to improve patient's ability to progress to PLOF and address remaining functional goals. (see flow sheet as applicable)     Details if applicable:     10 [phone] [id number] Manual Therapy (timed):  decrease pain, increase ROM, increase tissue extensibility, decrease edema, and decrease trigger points to improve patient's ability to progress to PLOF and address remaining functional goals.  The manual therapy  [facility]  INFECTIOUS DISEASES AND INTERNAL MEDICINE at [place]  =======================================================  Akbar Gonzalez MD  Diplomates American Board of Internal Medicine and Infectious Diseases  =======================================================    [id number]  EL SHER     Follow up: Pneumonia    Daughter in bedside, they feels that her breathing is worse. She is on nebulizer during exam with moderate wheezing.   No fever. Has cough.     PAST MEDICAL & SURGICAL HISTORY:  Liver, polycystic  Pericardial effusion  Mitral valve insufficiency  Aortic stenosis  Aortic valve insufficiency  Smoker unmotivated to quit  Humerus fracture, right, sequela: residual weakness  Left ventricular outflow tract obstruction  SOB (shortness of breath)  BENITEZ (dyspnea on exertion)  Unstable angina  Arthritis: osteoarthritis  Hiatal hernia  Cataract  HTN (hypertension)  HLD (hyperlipidemia)  COPD (chronic obstructive pulmonary disease)  Macular degeneration of both eyes  History of cholecystectomy  History of coronary angiogram: dr saenz  [facility]  2016  History of tonsillectomy  Liver cyst: surgical removal of multiple liver cysts  Anal fistula  Ectopic pregnancy  History of appendectomy  H/O shoulder replacement: right    Social Hx: smokes but lately trying to cut down, no other toxic habits.     FAMILY HISTORY:  Family history of MI (myocardial infarction) (Father)    Antibiotics:   Ceftriaxone  Azithromycin     Allergies  penicillin (Rash)  sulfur hexafluoride (Anaphylaxis; Rash)    REVIEW OF SYSTEMS:  CONSTITUTIONAL:  No Fever or chills  HEENT:  No diplopia or blurred vision.  No sore throat or runny nose.  CARDIOVASCULAR:  No chest pain, +SOB.  RESPIRATORY:  + cough, + shortness of breath  GASTROINTESTINAL:  No nausea, vomiting or diarrhea.  GENITOURINARY:  No dysuria, frequency or urgency. No Blood in urine  MUSCULOSKELETAL:  no joint aches, no muscle pain  SKIN:  No change in skin, hair or nails.  NEUROLOGIC:  No paresthesias, fasciculations, seizures or weakness.  PSYCHIATRIC:  No disorder of thought or mood.  ENDOCRINE:  No heat or cold intolerance, polyuria or polydipsia.  HEMATOLOGICAL:  No easy bruising or bleeding.     Physical Exam:  Vital Signs Last 24 Hrs  T(C): 36.7 (15 Feb 2019 07:28), Max: 37.1 (15 Feb 2019 00:03)  T(F): 98 (15 Feb 2019 07:28), Max: 98.7 (15 Feb 2019 00:03)  HR: 71 (15 Feb 2019 07:28) (66 - 80)  BP: 157/67 (15 Feb 2019 07:28) (112/59 - 157/67)  BP(mean): --  RR: 20 (15 Feb 2019 07:28) (18 - 20)  SpO2: 100% (15 Feb 2019 07:28) (93% - 100%)  GEN: NAD, elderly and frail   HEENT: normocephalic and atraumatic. EOMI. PERRL.    NECK: Supple.  No lymphadenopathy   LUNGS: Bilateral crackles, R>L and wheezing bilaterally   HEART: Regular rate and rhythm 2/6 systolic murmur.  ABDOMEN: Soft, nontender, and nondistended.  Positive bowel sounds.    : No CVA tenderness  EXTREMITIES: trace edema.  NEUROLOGIC: grossly intact.  PSYCHIATRIC: Appropriate affect .  SKIN: No ulceration or induration present.    Labs:  02-15    150<H>  |  101  |  21.0<H>  ----------------------------<  106  4.6   |  40.0<H>  |  0.55    Ca    9.2      15 Feb 2019 06:45  Mg     2.3     02-15                        12.7   14.7  )-----------( 371      ( 15 Feb 2019 06:45 )             41.9     RECENT CULTURES:  02-12 @ 18:20 .Urine     No growth    02-12 @ 13:44 .Blood     No growth at 48 hours    02-12 @ 13:13      NotDete    All imaging and other data have been reviewed.